# Patient Record
Sex: FEMALE | Race: WHITE | NOT HISPANIC OR LATINO | Employment: OTHER | URBAN - METROPOLITAN AREA
[De-identification: names, ages, dates, MRNs, and addresses within clinical notes are randomized per-mention and may not be internally consistent; named-entity substitution may affect disease eponyms.]

---

## 2017-01-09 ENCOUNTER — OFFICE VISIT (OUTPATIENT)
Dept: RHEUMATOLOGY | Facility: CLINIC | Age: 42
End: 2017-01-09
Payer: COMMERCIAL

## 2017-01-09 ENCOUNTER — CLINICAL SUPPORT (OUTPATIENT)
Dept: CARDIOLOGY | Facility: CLINIC | Age: 42
End: 2017-01-09
Payer: COMMERCIAL

## 2017-01-09 ENCOUNTER — RESEARCH ENCOUNTER (OUTPATIENT)
Dept: RESEARCH | Facility: HOSPITAL | Age: 42
End: 2017-01-09
Payer: COMMERCIAL

## 2017-01-09 ENCOUNTER — LAB VISIT (OUTPATIENT)
Dept: LAB | Facility: HOSPITAL | Age: 42
End: 2017-01-09
Attending: INTERNAL MEDICINE
Payer: COMMERCIAL

## 2017-01-09 DIAGNOSIS — Z51.81 MEDICATION MONITORING ENCOUNTER: Chronic | ICD-10-CM

## 2017-01-09 DIAGNOSIS — M35.00 SJOGREN'S DISEASE: ICD-10-CM

## 2017-01-09 DIAGNOSIS — Z00.6 EXAMINATION FOR NORMAL COMPARISON OR CONTROL IN CLINICAL RESEARCH: ICD-10-CM

## 2017-01-09 DIAGNOSIS — M35.00 SJOGREN'S DISEASE: Primary | ICD-10-CM

## 2017-01-09 LAB
DRUG STUDY SPECIMEN TYPE: NORMAL
DRUG STUDY TEST NAME: NORMAL

## 2017-01-09 PROCEDURE — 93000 ELECTROCARDIOGRAM COMPLETE: CPT | Mod: Q1,S$GLB,, | Performed by: NUCLEAR MEDICINE

## 2017-01-09 PROCEDURE — 99214 OFFICE O/P EST MOD 30 MIN: CPT | Mod: S$GLB,,, | Performed by: INTERNAL MEDICINE

## 2017-01-09 PROCEDURE — 36415 COLL VENOUS BLD VENIPUNCTURE: CPT | Mod: PO

## 2017-01-09 PROCEDURE — 99999 PR PBB SHADOW E&M-EST. PATIENT-LVL I: CPT | Mod: PBBFAC,,, | Performed by: INTERNAL MEDICINE

## 2017-01-09 NOTE — PROGRESS NOTES
CHIEF COMPLAINT:  Sjogren's.    PERTINENT HISTORY:  Rain continues on our Sjogren's study.  To this point, she   feels about the same.  She still has some moderate joint aches and pains in   various areas, though it seems to come and go.  She has had some improvement in   dry mouth at times, but then other times it feels about the same.  There has   been no change in glandular swelling.  Her appetite is okay.  She really did not   describe any side effects from the medication as far as severe nausea,   vomiting, or any kind of new rashes or other side effects.  She is doing well   participating in the study.    REVIEW OF SYSTEMS:  Basically negative for all major issues.  We talked about   her cholesterol, which is slightly elevated, but she has an excellent HDL, does   not meet criteria for treatment by the cholesterol guidelines.    PHYSICAL EXAMINATION:  VITAL SIGNS:  Her exam with her blood pressure 117/84, heart rate 82, weight 97   kilograms, respiratory rate 14, temperature 98.6 Fahrenheit, pain score 3,   generalized.  HEENT:   Shows a mild dryness of the eyes, mild dryness of the mouth, mild   submandibular swelling present.  Parotids are okay.  She has no inguinal   lymphadenopathy in supraclavicular area or neck.  Thyroid is not tender.  CARDIOPULMONARY:  Normal.  MUSCULOSKELETAL:  Muscle strength is still good.  Joint exam with no synovitis.    Her joints do not have any heat or redness, although they do ache.    LABORATORY DATA:  Reviewed.  She has an improved white cell count in the 5,000   range.  She had normal creatinine and liver studies.  Urinalysis is normal, hCG   negative.  Complements are normal.  CRP is good.    IMPRESSION:  Sjogren's with symptoms about the same, no intolerance or side   effects from the medication.    PLAN:  Continue the study.  No change in meds.  Encouraged her to eat a healthy   diet, exercise, and I do not think she needs to be on extra medication for    cholesterol.    /sc [id number] blanks      MAISHA/ALEX  dd: 01/09/2017 10:34:20 (CST)  td: 01/09/2017 20:54:47 (CST)  Doc ID   #[id number]  Job ID #[id number]    CC:

## 2017-01-10 NOTE — PROGRESS NOTES
PROTOCOL: ROCHE [id number]  SUBJECT INITIALS: [facility]  SUBJECT SCREENING NUMBER: [id number]  SUBJECT ID: [id number]        Subject came in today for Week 6 visit for the ROCHE [id number] trial. Subject wishes to continue with the study. Subject notes no changes in medication and no adverse events since last visit.      Vitals for visit are as follows:   BP: 117/84  Pulse: 82  RR: 14   Weight: 87.9 kg  Temp: 98.6 F      Subject reports experiencing no adverse effects since last visit. No nausea, vomiting, diarrhea, chest pains, shortness of breath, fever, or rash. No other side effects discussed at screening and Baseline were reported.      ESSDAI was completed by the PI per protocol. ESSPRI and SF-36 were completed by the subject per protocol.       Tear samples were collected per protocol --- beginning at 09:45 and ending 09:48 --- left eye 9 mm and right eye 13 mm. Salivary flow rate was tested per protocol --- weight of saliva tube before collection 15.67 grams --- weight of saliva tube after collection 18.65 grams. Subject completed salivary collection over 2 minutes (10:02 - 10:04). She was provided parafin wax to stimulate salivary production.       Patient brought back previously dispensed medication for collection:     [id number] - returned empty - all pills taken per Baseline instruction  [id number] 46 pills --- bottle unopened  [id number] - 10 pills remaining  [id number] - 12 pills remaining    Patient was dispensed new bottles per PI --- all other study drug was collected --- [id number], [id number], and [id number] were dispensed to the patient. Patient was instructed to continue taking medication as instructed --- Patient was informed that she will take 100 mg BID with food (once in the morning and once in the evening). Patient verbalized understanding. It was reiterated that patient should contact Dr. White or Research Coordinator if she feels that she is experiencing any adverse effects at any point in the study. Patient  verbalized understanding.       An ECG was performed --- triplicate readings were completed per protocol beginning at 10:43 and ending at 10:45      ESR sample was drawn and ESR reading completed. Test started at 11:10 and ended at 12:10. ESR --- 12.       Pregnancy test was performed and was negative.      Labs were drawn per the study protocol at 09:20. A urine sample was collected at 10:00. All labs were shipped per requisition (updated laboratory data provided by ; all labs shipped to [place], [place]) --- confirmation number: [id number].

## 2017-02-06 ENCOUNTER — CLINICAL SUPPORT (OUTPATIENT)
Dept: CARDIOLOGY | Facility: CLINIC | Age: 42
End: 2017-02-06
Payer: COMMERCIAL

## 2017-02-06 ENCOUNTER — RESEARCH ENCOUNTER (OUTPATIENT)
Dept: RESEARCH | Facility: HOSPITAL | Age: 42
End: 2017-02-06
Payer: COMMERCIAL

## 2017-02-06 ENCOUNTER — LAB VISIT (OUTPATIENT)
Dept: LAB | Facility: HOSPITAL | Age: 42
End: 2017-02-06
Attending: INTERNAL MEDICINE
Payer: COMMERCIAL

## 2017-02-06 DIAGNOSIS — M35.00 SJOGREN'S DISEASE: ICD-10-CM

## 2017-02-06 DIAGNOSIS — Z00.6 EXAMINATION FOR NORMAL COMPARISON OR CONTROL IN CLINICAL RESEARCH: ICD-10-CM

## 2017-02-06 LAB
DRUG STUDY SPECIMEN TYPE: NORMAL
DRUG STUDY TEST NAME: NORMAL

## 2017-02-06 PROCEDURE — 93000 ELECTROCARDIOGRAM COMPLETE: CPT | Mod: S$GLB,,, | Performed by: INTERNAL MEDICINE

## 2017-02-06 PROCEDURE — 36415 COLL VENOUS BLD VENIPUNCTURE: CPT | Mod: PO

## 2017-02-06 NOTE — PROGRESS NOTES
PROTOCOL: ROCHE [id number]  SUBJECT INITIALS: [facility]  SUBJECT SCREENING NUMBER: [id number]  SUBJECT ID: [id number]          Subject came in today for Week 9 visit for the ROCHE [id number] trial. Subject wishes to continue with the study. Subject notes no changes in medication and no adverse events since last visit.      Vitals for visit are as follows:   BP: 127/89  Pulse: 84  RR: 14   Weight: 88.3 kg  Temp: 96.5 F      Subject reports experiencing no adverse effects since last visit. No nausea, vomiting, diarrhea, chest pains, shortness of breath, fever, or rash. No other side effects discussed at screening and Baseline were reported.      ESSPRI was completed by the subject per protocol.       No tear or saliva samples were required at this visit.     No new medication was dispensed. Subject did not bring medication bottles to this visit, but informed coordinator that she believed she has enough medication to last until her Week. 12 visit. Patient was informed that if low on medication, more could be dispensed to her. Patient verbalized understanding and will check home supply to ensure she has an adequate amount. It was reiterated that patient should contact Dr. White or Research Coordinator if she feels that she is experiencing any adverse effects at any point in the study. Patient verbalized understanding.       An ECG was performed --- triplicate readings were completed per protocol beginning at 09:15 and ending at 09:17      ESR sample was drawn and ESR reading completed. Test started at 09:30 and ended at 10:30. ESR --- 9.       Pregnancy test was performed and was negative.      Labs were drawn per the study protocol at 08:55. A urine sample was collected at 09:00. All labs were shipped per requisition (updated laboratory data provided by ; all labs shipped to [place], [place]).

## 2017-02-08 DIAGNOSIS — M35.00 SJOGREN'S DISEASE: ICD-10-CM

## 2017-02-08 RX ORDER — HYDROXYCHLOROQUINE SULFATE 200 MG/1
200 TABLET, FILM COATED ORAL 2 TIMES DAILY
Qty: 60 TABLET | Refills: 6 | Status: SHIPPED | OUTPATIENT
Start: 2017-02-08 | End: 2018-03-14 | Stop reason: SDUPTHER

## 2017-02-21 ENCOUNTER — CLINICAL SUPPORT (OUTPATIENT)
Dept: CARDIOLOGY | Facility: CLINIC | Age: 42
End: 2017-02-21
Payer: COMMERCIAL

## 2017-02-21 ENCOUNTER — OFFICE VISIT (OUTPATIENT)
Dept: RHEUMATOLOGY | Facility: CLINIC | Age: 42
End: 2017-02-21
Payer: COMMERCIAL

## 2017-02-21 ENCOUNTER — RESEARCH ENCOUNTER (OUTPATIENT)
Dept: RESEARCH | Facility: HOSPITAL | Age: 42
End: 2017-02-21
Payer: COMMERCIAL

## 2017-02-21 ENCOUNTER — LAB VISIT (OUTPATIENT)
Dept: LAB | Facility: HOSPITAL | Age: 42
End: 2017-02-21
Attending: INTERNAL MEDICINE
Payer: COMMERCIAL

## 2017-02-21 VITALS
BODY MASS INDEX: 29.77 KG/M2 | RESPIRATION RATE: 14 BRPM | SYSTOLIC BLOOD PRESSURE: 128 MMHG | HEART RATE: 80 BPM | DIASTOLIC BLOOD PRESSURE: 81 MMHG | WEIGHT: 195.75 LBS | TEMPERATURE: 99 F

## 2017-02-21 DIAGNOSIS — M35.00 SJOGREN'S DISEASE: ICD-10-CM

## 2017-02-21 DIAGNOSIS — R59.0 LYMPHADENOPATHY, AXILLARY: ICD-10-CM

## 2017-02-21 DIAGNOSIS — Z00.6 EXAMINATION FOR NORMAL COMPARISON OR CONTROL IN CLINICAL RESEARCH: ICD-10-CM

## 2017-02-21 DIAGNOSIS — M35.00 SJOGREN'S DISEASE: Primary | ICD-10-CM

## 2017-02-21 DIAGNOSIS — K21.9 GASTROESOPHAGEAL REFLUX DISEASE, ESOPHAGITIS PRESENCE NOT SPECIFIED: ICD-10-CM

## 2017-02-21 DIAGNOSIS — Z51.81 MEDICATION MONITORING ENCOUNTER: Chronic | ICD-10-CM

## 2017-02-21 LAB
DRUG STUDY SPECIMEN TYPE: NORMAL
DRUG STUDY TEST NAME: NORMAL

## 2017-02-21 PROCEDURE — 99999 PR PBB SHADOW E&M-EST. PATIENT-LVL I: CPT | Mod: PBBFAC,,, | Performed by: INTERNAL MEDICINE

## 2017-02-21 PROCEDURE — 93000 ELECTROCARDIOGRAM COMPLETE: CPT | Mod: S$GLB,,, | Performed by: INTERNAL MEDICINE

## 2017-02-21 PROCEDURE — 36415 COLL VENOUS BLD VENIPUNCTURE: CPT | Mod: PO

## 2017-02-21 PROCEDURE — 99214 OFFICE O/P EST MOD 30 MIN: CPT | Mod: S$GLB,,, | Performed by: INTERNAL MEDICINE

## 2017-02-21 NOTE — PROGRESS NOTES
RHEUMATOLOGY FOLLOW-UP    CHIEF COMPLAINT:  Sjogren's.    PERTINENT HISTORY:  Olesya is finishing the last week of her Sjogren's study.  She   does note that she has a little more energy presently.  She has had less joint   aches and pains than she has been having.  She is not noticing any real big   change in her dryness symptoms to date.  She does feel healthy, actually turning   42 today.  She wants to get into exercising more.    REVIEW OF SYSTEMS:  GENERAL:  She has had no significant weight loss, fevers, or infections.  No   serious side effects from the medication are appreciated.   No chills,   fatigability, change in appetite.  SKIN:  No rashes, itching, or changes in color or texture of skin, no Raynaud's,   no malar rash.  HEAD:  No headache or recent head trauma.  EYES:  Visual acuity fine.  No ocular pain or redness.  EARS:  Denies ear pain, discharge or vertigo.  NOSE:  No loss of smell.  No epistaxis or postnasal drip.  MOUTH AND THROAT:  No hoarseness or change in voice or oral ulcers.  No   difficulty swallowing or dryness.  NODES:  Denies swollen glands.  CHEST:  There has been no pulmonary system changes that have developed.  Denies   shortness of breath, ASHRAF, cyanosis, wheezing, cough, and sputum production.  CARDIOVASCULAR:  There has been no cardiac system changes that have developed.    Denies chest pain, PND, orthopnea, or reduced exercise tolerance.  ABDOMEN:  GI wise, she does have some alternating diarrhea, constipation and   more reflux recently.  She is back on Nexium.  She has had one black stool, but   does not have any history of bleeding in the past.  No weight loss.  She is not   taking Pepto-Bismol.  Denies nausea, vomiting, abdominal pain, hematemesis.  URINARY:  No flank pain, dysuria or hematuria.  PERIPHERAL VASCULAR:  No claudication or cyanosis.  NEUROLOGIC:  No numbness, weakness or tingling.  GYNECOLOGIC:  Endometriosis noted by history.  MUSCULOSKELETAL:  Bones with  osteopenia by history.    MEDICATIONS:  Her med list reviewed.  She is taking Nexium now along with fish   oil and Plaquenil 200 b.i.d. and vitamin E.    PHYSICAL EXAMINATION:  VITAL SIGNS:  Today with blood pressure 128/81, with heart rate 80, weight 88.8   kg, respiratory rate normal, temperature is 98.9.  APPEARANCE:  Well nourished, well developed, in no acute distress.  SKIN:  No rashes, no wounds, no ecchymosis.  Nail beds pink.  No digital ulcers.    No nodules or tightness noted.  HEENT:  With very mild dryness in the eyes and mouth.  No redness in the eyes.    No lacrimal, parotid, or submandibular gland enlargement.  Minimal swollen nodes   are noted in her neck.  There is no tenderness in her thyroid.  Axillary areas,   has a small lymph node of the left axillary, it seems to be smaller.    Normocephalic, atraumatic, alert and oriented X3.  PERRLA.  Conjunctivae clear,   sclerae nonicteric.  No tonsillar enlargement.  No pharyngeal erythema or   exudate.  No ulcers or lesions noted.  Oral pharynx clear.  Neck supple, no JVD.    Normal ROM.  Thyroid normal.  No masses or tracheal deviation.  CHEST:  Lungs clear to auscultation bilaterally.  No dullness to percussion.  No   accessory muscle use.  No intercostal retraction noted.  CARDIOVASCULAR:  Normal S1, S2.  No rubs, murmurs or gallops.  No peripheral   edema.  ABDOMEN:  Bowel sounds normal, abdomen soft, not distended.  No tenderness or   masses.  No hepatosplenomegaly.  EXTREMITIES:  No clubbing, cyanosis or edema noted.  Dorsalis and pedis pulses   2+ palpable.  NEUROLOGICAL:  Cranial nerves II-XII intact.  Motor 5/5 strength major   flexors/extensors.  No tremor.  GAIT AND POSTURE:  Normal gait.  No cerebellar signs.  MUSCULOSKELETAL:  Muscle strength is normal.  Joint exam is normal.  Noticed   some arthralgias in her the hands.  Knees and hips move well.  She does not have   any major edema.    LABORATORY DATA:  Show the hemoglobin slightly low at  11.7, stable, MCV 87.  Her   chemistries include a normal creatinine, normal LFTS.  Lipids looked excellent.    Cholesterol 193, , triglyceride 62, HDL 67.  Urine with negative blood   or protein.  CRP 0.1.    IMPRESSION:  1.  Sjogren's syndrome - stable.  No signs of worsening presently.  No reactions   to her study meds.  2.  GI  irritation that sounds like irritable bowel and reflux together.    PLAN:  1.  We will have her try to use more fiber in her diet.  She can continue Nexium   over-the-counter.  Consider Pepto-Bismol if she gets diarrhea.  2.  No change in her omega-3.  3.  No change in Plaquenil.  4.  Continue to follow every three months unless other issues develop.  Follow   up with a study in a month.      MAISHA/ALEX  dd: 02/21/2017 09:25:20 (CST)  td: 02/21/2017 15:47:37 (CST)  Doc ID   #[id number]  Job ID #[id number]    CC: Beena Kwon M.D.

## 2017-02-21 NOTE — PROGRESS NOTES
PROTOCOL: ROCHE [id number]  SUBJECT INITIALS: [facility]  SUBJECT SCREENING NUMBER: [id number]  SUBJECT ID: [id number]      Subject came in today for Week 12 visit for the ROCHE [id number] trial. Subject wishes to continue with the study. Subject notes no changes in medication and no adverse events since last visit.      Vitals for visit are as follows:   BP: 128/81  Pulse: 80  RR: 14   Weight: 88.8 kg  Temp: 98.9 F      Subject reports experiencing no adverse effects since last visit. No nausea, vomiting, chest pains, shortness of breath, fever, or rash. No other side effects discussed at screening and Baseline were reported.       ESSDAI was completed by the PI per protocol. ESSPRI and SF-36 were completed by the subject per protocol.       Tear samples were collected per protocol --- beginning at 09:35 and ending 09:40 --- left eye 10 mm and right eye 17 mm. Salivary flow rate was tested per protocol --- weight of saliva tube before collection 15.76 grams --- weight of saliva tube after collection 17.17 grams. Subject completed salivary collection over 5 minutes (9:28 - 9:33). She was provided parafin wax to stimulate salivary production.       Patient brought back previously dispensed medication for collection:      [id number] - returned empty   [id number] - returned empty  [id number] - returned empty  [id number] - returned empty  [id number] - 34 pills remaining  [id number] - 46 pills remaining     All medication was returned.      An ECG was performed --- triplicate readings were completed per protocol beginning at 09:54 and ending at 09:56.      ESR sample was drawn and ESR reading completed. Test started at 09:55 and ended at 10:55. ESR --- 30.       Pregnancy test was performed and was negative.      Labs were drawn per the study protocol at 08:45. A urine sample was collected at 09:45. All labs were shipped per requisition (updated laboratory data provided by ; all labs shipped to [facility] [place])..

## 2017-03-06 ENCOUNTER — PATIENT MESSAGE (OUTPATIENT)
Dept: INTERNAL MEDICINE | Facility: CLINIC | Age: 42
End: 2017-03-06

## 2017-03-13 ENCOUNTER — CLINICAL SUPPORT (OUTPATIENT)
Dept: CARDIOLOGY | Facility: CLINIC | Age: 42
End: 2017-03-13
Payer: COMMERCIAL

## 2017-03-13 ENCOUNTER — OFFICE VISIT (OUTPATIENT)
Dept: RHEUMATOLOGY | Facility: CLINIC | Age: 42
End: 2017-03-13
Payer: COMMERCIAL

## 2017-03-13 ENCOUNTER — RESEARCH ENCOUNTER (OUTPATIENT)
Dept: RESEARCH | Facility: HOSPITAL | Age: 42
End: 2017-03-13
Payer: COMMERCIAL

## 2017-03-13 ENCOUNTER — LAB VISIT (OUTPATIENT)
Dept: LAB | Facility: HOSPITAL | Age: 42
End: 2017-03-13
Attending: INTERNAL MEDICINE
Payer: COMMERCIAL

## 2017-03-13 DIAGNOSIS — M35.00 SJOGREN'S DISEASE: Primary | ICD-10-CM

## 2017-03-13 DIAGNOSIS — R59.9 ADENOPATHY: ICD-10-CM

## 2017-03-13 DIAGNOSIS — M35.00 SJOGREN'S DISEASE: ICD-10-CM

## 2017-03-13 DIAGNOSIS — Z00.6 EXAMINATION FOR NORMAL COMPARISON OR CONTROL IN CLINICAL RESEARCH: ICD-10-CM

## 2017-03-13 DIAGNOSIS — Z51.81 MEDICATION MONITORING ENCOUNTER: Chronic | ICD-10-CM

## 2017-03-13 DIAGNOSIS — R76.8 RHEUMATOID FACTOR POSITIVE: ICD-10-CM

## 2017-03-13 LAB — DRUG STUDY SPECIMEN TYPE: NORMAL

## 2017-03-13 PROCEDURE — 93000 ELECTROCARDIOGRAM COMPLETE: CPT | Mod: S$GLB,,, | Performed by: NUCLEAR MEDICINE

## 2017-03-13 PROCEDURE — 99215 OFFICE O/P EST HI 40 MIN: CPT | Mod: S$GLB,,, | Performed by: INTERNAL MEDICINE

## 2017-03-13 PROCEDURE — 1160F RVW MEDS BY RX/DR IN RCRD: CPT | Mod: S$GLB,,, | Performed by: INTERNAL MEDICINE

## 2017-03-13 PROCEDURE — 36415 COLL VENOUS BLD VENIPUNCTURE: CPT | Mod: PO

## 2017-03-13 PROCEDURE — 99999 PR PBB SHADOW E&M-EST. PATIENT-LVL I: CPT | Mod: PBBFAC,,, | Performed by: INTERNAL MEDICINE

## 2017-03-13 NOTE — PROGRESS NOTES
RHEUMATOLOGY FOLLOW-UP    CHIEF COMPLAINT:  Sjogren's study.    PERTINENT HISTORY:  Olesya has completed her Sjogren's study.  She says, she has a   little less stiffness and soreness than she had to begin with.  There has been   no major changes in her dry eyes symptoms that she can tell.  She remains on   Plaquenil 200 mg b.i.d. and is using omega-3.  She has noted a couple of places   with lymphadenopathy, particularly under her right axilla.  No nodes are   seemingly growing.  Her weight has been fluctuating.  She had no severe eye   redness or itching of her eyes.  She has had no increasing cough or respiratory   symptoms.    REVIEW OF SYSTEMS:  GENERAL:  Weight as mentioned.  No severe infection.  She had no reactions to   the study drug.  This included no skin rashes, no increasing nausea or vomiting,   no diarrhea, no ill effects from a cardiopulmonary standpoint of chest pain,   shortness of breath, etc.  In general, everything seems stable.  No fevers,   chills, fatigability, change in appetite.  No recent infections.  SKIN:  No rashes, itching or changes in color or texture of skin, no Raynaud's,   no malar rash.  HEAD:  No headache or recent head trauma.  EYES:  Visual acuity fine.  No ocular pain or redness.  EARS:  Denies ear pain, discharge or vertigo.  NOSE:  No loss of smell.  No epistaxis or postnasal drip.  MOUTH AND THROAT:  No hoarseness or change in voice or oral ulcers.  No   difficulty swallowing or dryness.  NODES:  Denies swollen glands.  CHEST:  Denies shortness of breath, ASHRAF, cyanosis, wheezing, cough and sputum   production.  CARDIOVASCULAR:  Denies chest pain, PND, orthopnea or reduced exercise   tolerance.  ABDOMEN:  She does have irritable bowel type symptoms, alternating diarrhea and   constipation in the past and reflux, is on a PPI.  No weight loss.  Denies   nausea, vomiting, abdominal pain, hematemesis or blood in stool.  URINARY:  No flank pain, dysuria or hematuria.  PERIPHERAL  VASCULAR:  No claudication or cyanosis.  NEUROLOGIC:  No numbness, weakness or tingling.    PHYSICAL EXAMINATION:  VITAL SIGNS:  Weight 86.5 kg, blood pressure 118/78, with the heart rate 70,   pain score of 4, temperature 98.6.  APPEARANCE:  Well nourished, well developed, in no acute distress.  SKIN:  With no new rashes, minimal dryness.  She has got no Raynaud's.  Blood   vessel pulses are good.  No wounds, no ecchymosis.  Nail beds pink.  No digital   ulcers.  No nodules or tightness noted.  HEENT:  Just have mild dry mouth.  Eyes are slightly dry as well, but they are   not red, not sensitive.  No lacrimal enlargement.  Her parotid and submandibular   are not swollen today, though they swell at times.  Normocephalic, atraumatic,   alert and oriented x3.  PERRLA.  Conjunctivae clear, sclerae nonicteric.  No   tonsillar enlargement.  No pharyngeal erythema or exudate.  No ulcers or lesions   noted.  Mucous membranes moist and pink.  Oral pharynx clear.  Neck supple, no   JVD.  Normal ROM.  Thyroid normal.  No masses or tracheal deviation.  No   cervical, axillary or inguinal lymph node enlargement.  CHEST:  Lungs clear to auscultation bilaterally.  No dullness to percussion.  No   accessory muscle use.  No intercostal retraction noted.  CARDIOVASCULAR:  Normal S1, S2.  No rubs, murmurs or gallops.  No peripheral   edema.  ABDOMEN:  Bowel sounds normal, abdomen soft, not distended.  No tenderness or   masses.  No hepatosplenomegaly.  EXTREMITIES:  No clubbing, cyanosis or edema noted.  Dorsalis and pedis pulses   2+ palpable.  NEUROLOGICAL:  Cranial nerves II-XII intact.  Motor 5/5 strength major   flexors/extensors.  No tremor.  GAIT AND POSTURE:  Normal gait.  No cerebellar signs.  MUSCULOSKELETAL:  There is no adenopathy nor anterior or posterior triangles.    Supraclavicular lymph nodes are good.  Just in the side, there is a small 1 cm   node in the right axilla she pointed out to me, not tender.  The left  axilla   good today.  No epitrochlear nodes.  Her hands with full motion.  PIP, MPS and   wrist, no synovitis.  The elbows, shoulders, hips and knees move well today.  No   synovitis of the knees.  Ankles move well.  No significant edema.    LABORATORY DATA:  Recent laboratory data with the white count 4700, hematocrit   stable at 38%, differential good.  Electrolytes normal.  Creatinine 0.7.  LFTs   normal.  Glucose normal.  CK normal.  Cholesterol 202, HDL upper normal 64, LDL   is normal 124.  Urinalysis negative.  Pregnancy test negative.  Troponin normal.    CRP 11.1.  Complements low normal 128 and 21 respectively.  INR normal.    IMPRESSION:  Sjogren's - on study with no complications.  Overall, she appears   stable, with mild lymphadenopathy present.    PLAN:  We will see back in three months.  Continue her Plaquenil and vitamins.    I encouraged her to exercise and do her diet.      MAISHA/PN  dd: 03/13/2017 09:31:49 (CDT)  td: 03/13/2017 12:47:38 (CDT)  Doc ID   #[id number]  Job ID #[id number]    CC: Beena Kwon M.D.

## 2017-03-13 NOTE — PROGRESS NOTES
PROTOCOL: ROCHE [id number]  SUBJECT INITIALS: [facility]  SUBJECT SCREENING NUMBER: [id number]  SUBJECT ID: [id number]      Subject came in today for Follow-up visit for the ROCHE [id number] trial. Subject is aware that this is the final visit for the trial.       Vitals for visit are as follows:     BP: 118/78  Pulse: 70  RR: 14   Weight: 86.5 kg  Temp: 98.6 F      Subject reports no issues since last visit.       ESSDAI was completed by the PI per protocol. ESSPRI was completed by the subject per protocol.         An ECG was performed --- triplicate readings were completed per protocol beginning at 09:55 and ending at 09:57.      Pregnancy test was performed and was negative.      Labs were drawn per the study protocol at 09:05. A urine sample was collected at 09:45. All labs were shipped per requisition (updated laboratory data provided by ; all labs shipped to [facility] [place]).

## 2017-03-27 ENCOUNTER — TELEPHONE (OUTPATIENT)
Dept: INTERNAL MEDICINE | Facility: CLINIC | Age: 42
End: 2017-03-27

## 2017-03-27 ENCOUNTER — HOSPITAL ENCOUNTER (OUTPATIENT)
Dept: RADIOLOGY | Facility: HOSPITAL | Age: 42
Discharge: HOME | End: 2017-03-27
Attending: FAMILY MEDICINE
Payer: COMMERCIAL

## 2017-03-27 ENCOUNTER — HOSPITAL ENCOUNTER (OUTPATIENT)
Dept: RADIOLOGY | Facility: HOSPITAL | Age: 42
Discharge: HOME | End: 2017-03-27
Attending: INTERNAL MEDICINE
Payer: COMMERCIAL

## 2017-03-27 ENCOUNTER — OFFICE VISIT (OUTPATIENT)
Dept: PULMONOLOGY | Facility: CLINIC | Age: 42
End: 2017-03-27
Payer: COMMERCIAL

## 2017-03-27 VITALS
BODY MASS INDEX: 28.46 KG/M2 | WEIGHT: 187.81 LBS | OXYGEN SATURATION: 98 % | SYSTOLIC BLOOD PRESSURE: 100 MMHG | HEIGHT: 68 IN | HEART RATE: 79 BPM | RESPIRATION RATE: 18 BRPM | DIASTOLIC BLOOD PRESSURE: 70 MMHG

## 2017-03-27 DIAGNOSIS — R91.8 MULTIPLE PULMONARY NODULES: ICD-10-CM

## 2017-03-27 DIAGNOSIS — R91.1 LUNG NODULE: ICD-10-CM

## 2017-03-27 DIAGNOSIS — M35.00 SJOGREN'S DISEASE: ICD-10-CM

## 2017-03-27 DIAGNOSIS — Z12.31 ENCOUNTER FOR SCREENING MAMMOGRAM FOR MALIGNANT NEOPLASM OF BREAST: Primary | ICD-10-CM

## 2017-03-27 DIAGNOSIS — Z12.31 ENCOUNTER FOR SCREENING MAMMOGRAM FOR MALIGNANT NEOPLASM OF BREAST: ICD-10-CM

## 2017-03-27 DIAGNOSIS — R91.1 LUNG NODULE: Primary | ICD-10-CM

## 2017-03-27 PROCEDURE — 90686 IIV4 VACC NO PRSV 0.5 ML IM: CPT | Mod: S$GLB,,, | Performed by: INTERNAL MEDICINE

## 2017-03-27 PROCEDURE — 1160F RVW MEDS BY RX/DR IN RCRD: CPT | Mod: S$GLB,,, | Performed by: INTERNAL MEDICINE

## 2017-03-27 PROCEDURE — 71260 CT THORAX DX C+: CPT | Mod: 26,,, | Performed by: RADIOLOGY

## 2017-03-27 PROCEDURE — 90471 IMMUNIZATION ADMIN: CPT | Mod: S$GLB,,, | Performed by: INTERNAL MEDICINE

## 2017-03-27 PROCEDURE — 99999 PR PBB SHADOW E&M-EST. PATIENT-LVL III: CPT | Mod: PBBFAC,,, | Performed by: INTERNAL MEDICINE

## 2017-03-27 PROCEDURE — 25500020 PHARM REV CODE 255: Mod: PO | Performed by: INTERNAL MEDICINE

## 2017-03-27 PROCEDURE — 77067 SCR MAMMO BI INCL CAD: CPT | Mod: 26,,, | Performed by: RADIOLOGY

## 2017-03-27 PROCEDURE — 77067 SCR MAMMO BI INCL CAD: CPT | Mod: TC

## 2017-03-27 PROCEDURE — 99214 OFFICE O/P EST MOD 30 MIN: CPT | Mod: 25,S$GLB,, | Performed by: INTERNAL MEDICINE

## 2017-03-27 PROCEDURE — 71260 CT THORAX DX C+: CPT | Mod: TC,PO

## 2017-03-27 RX ADMIN — IOHEXOL 75 ML: 350 INJECTION, SOLUTION INTRAVENOUS at 09:03

## 2017-03-27 NOTE — PATIENT INSTRUCTIONS
Pulmonary Nodule      A pulmonary ( lung) nodule is small, round growth in the lung. The size of a pulmonary nodule can be as small as a pencil eraser (1/5 inch or 4 millmeters) to a little bigger than your biggest toenail (1 inch or 25 millimeters). A pulmonary nodule is usually an unplanned finding. It may be found on a chest X-ray or a computed tomography (CT) scan when you have imaging tests of your lungs done. When a pulmonary nodule is found, tests will be done to determine if the nodule is benign (not cancerous) or malignant (cancerous). Follow-up treatment or testing is based on the size of the pulmonary nodule and your risk of getting lung cancer.  CAUSES  Causes of pulmonary nodules can vary.  Benign pulmonary nodules can be caused from different things. Some of these things include:  Infection. This can be a common cause of a benign pulmonary nodule. The infection may be active (a current infection) or an old infection that is no longer active. Three types of infections can cause a pulmonary nodule. These are:   Bacterial Infection.   Fungal infection.   Viral Infections.   Hematoma. This is a bruise in the lung. A hematoma can happen from an injury to your chest.   Some common diseases can lead to benign pulmonary nodules. For example, rheumatoid arthritis can be a cause of a pulmonary nodule.   Other unusual things can cause a benign pulmonary nodule. These can include:   Having had tuberculosis.   Rare diseases, such as a lung cyst.   Malignant pulmonary nodules. These are cancerous growths. The cancer may have:  Started in the lung. Some lung cancers first detected as a pulmonary nodule.   Spread to the lung from cancer somewhere else in the body. This is called metastatic cancer.   Certain risk factors make a cancerous pulmonary nodule more likely. They include:   Age. As people get older, a pulmonary nodule is more likely to be cancerous.   Cancer history. If one of your immediate family members has  had cancer, you have a higher risk of developing cancer.   Smoking. This includes people who currently smoke and those who have quit.   DIAGNOSIS  To diagnose whether a pulmonary nodule is benign or malignant, a variety of tests will be done. This includes things such as:  Health history. Questions regarding your current health, past health, and family health will be asked.   Blood tests. Results of blood work can show:   Tumor markers for cancer.   Any type of infection.   A skin test called a tuberculin (TB) test may be done. This test can tell if you have been exposed to the germ that causes tuberculosis.   Imaging tests. These take pictures of your lungs. Types of imaging tests include:   Chest X-ray. This can help in several ways. An X-ray gives a close-up look at the pulmonary nodule. A new X-ray can be compared with any X-rays you have had in the past .   Computed tomography (CT) scan. This test shows smaller pulmonary nodules more clearly than an X-ray.   Positron emission tomography (PET) scan. This is a test that uses a radioactive substance to identify a pulmonary nodule. A safe amount of radioactive substance is injected into the blood stream. Then, the scan takes a picture of the pulmonary nodule. A malignant pulmonary nodule will absorb the substance faster than a benign pulmonary nodule. The radioactive substance is eliminated from your body in your urine.   Biopsy. This removes a tiny piece of the pulmonary nodule so it can be checked under a microscope. Medicine will be given to help keep you relaxed and pain free when a biopsy is done. Types of biopsies include:   Bronchoscopy . This is a surgical procedure. It can be used for pulmonary nodules that are close to the airways in the lung. It uses a scope (a thin tube) with a tiny camera and light on the end. The scope is put in the windpipe. Your caregiver can then see inside the lung. A tiny tool put through the scope is used to take a small sample  of the pulmonary nodule tissue.   Transthoracic needle aspiration . This method is used if the pulmonary nodule is far away from the air passages in the lung. A long, thin needle is put through the chest into the lung nodule. A CT scan is done at the same time which can make it easier to locate the pulmonary nodule.   Surgical lung biopsy . This is a surgical procedure in which the pulmonary nodule is removed. This is usually recommended when the pulmonary nodule is most likely malignant or a biopsy cannot be obtained by either bronchoscopy or transthoracic needle aspiration.   PULMONARY NODULE FOLLOW-UP RECOMMENDATIONS  The frequency of pulmonary nodule follow-up is based on your risk factors and size of the pulmonary nodule. If your caregiver suspects the pulmonary nodule is cancerous or the pulmonary nodule changes during any of the follow-up CT scans, additional testing or biopsies will be done.  If you have no or low risk of getting lung cancer (non-smoker, no personal cancer history), recommended follow-up is based on the following pulmonary nodule size:   A pulmonary nodule that is < 4 mm does not require any follow-up.   A pulmonary nodule that is 4 to 6 mm should be re-imaged by CT scan in 12 months.   A pulmonary nodule that is 6 to 8 mm should be re-imaged by CT scan at 6 to 12 months and then again at 18 to 24 months if no change in size.   A pulmonary nodule > 8 mm in size should be followed closely and re-imaged by CT scan at 3, 9, and 24 months.   If you are at risk of getting lung cancer (current or former smoker, family history of cancer), recommended follow-up is based on the following pulmonary nodule size:   A pulmonary nodule that is < 4 mm in size should be re-imaged by CT scan in 12 months.   A pulmonary nodule that is 4 to 6 mm in size should be re-imaged by CT scan at 6 to 12 months and again at 18 to 24 months.   A pulmonary nodule that is 6 to 8 mm in size should be re-imaged by CT scan at  3, 9, and 24 months.   A pulmonary nodule > 8 mm in size should be followed closely and re-imaged by CT scan at 3, 9, and 24 months.   SEEK MEDICAL CARE IF:  While waiting for test results to determine what type of pulmonary nodule you have, be sure to contact your caregiver if you:  Have trouble breathing when you are active.   Feel sick or unusually tired.   Do not feel like eating.   Lose weight without trying to.   Develop chills or night sweats.   Mild or moderate fevers generally have no long-term effects and often do not require treatment. There are a few exceptions (see below).   SEEK IMMEDIATE MEDICAL CARE IF:  You cannot catch your breath or you begin wheezing.   You cannot stop coughing.   You cough up blood.   You feel like you are going to pass out or become dizzy.   You have sudden chest pain.   You have a fever or persistent symptoms for more than 72 hours.   You have a fever and your symptoms suddenly get worse.   MAKE SURE YOU  Understand these instructions.   Will watch your condition.   Will get help right away if you are not doing well or get worse.       Please call office [phone] for any questions

## 2017-03-27 NOTE — TELEPHONE ENCOUNTER
----- Message from Marisela Mancini sent at 3/27/2017 10:08 AM CDT -----  pls input mammo order, call to [facility]..[phone] (home)

## 2017-03-27 NOTE — MR AVS SNAPSHOT
[facility] - [facility]  [address] [place] [address]  Phone: [phone]  Fax: [phone]                  Olesya Tay   3/27/2017 1:00 PM   Office Visit    Description:  Female : 1975   Provider:  Thom Velazquez MD   Department:  [facility] - Pulmonary Services           Reason for Visit     Pulmonary Nodules           Diagnoses this Visit        Comments    Lung nodule    -  Primary     Multiple pulmonary nodules         Sjogren's disease                To Do List           Future Appointments        Provider Department Dept Phone    3/27/2017 2:00 PM [facility] [phone]    2017 10:15 AM [facility], [facility] [phone]    2017 10:30 AM Jazmin May PA-C [facility] [phone]      Goals (5 Years of Data)     None      Follow-Up and Disposition     Return in about 2 years (around 3/27/2019) for flu shot, chest ct.      [facility] -  Assistance  Registered nurses in the [facility] provide clinical advisement, health education, appointment booking, and other advisory services.  Call for this free service at [phone].             Medications           Message regarding Medications     Verify the changes and/or additions to your medication regime listed below are the same as discussed with your clinician today.  If any of these changes or additions are incorrect, please notify your healthcare provider.             Verify that the below list of medications is an accurate representation of the medications you are currently taking.  If none reported, the list may be blank. If incorrect, please contact your healthcare provider. Carry this list with you in case of emergency.           Current Medications     ESOMEPRAZOLE MAGNESIUM (NEXIUM 24HR ORAL) Take by mouth.    fish oil-omega-3 fatty acids 300-1,000 mg capsule Take 2 g by  "mouth once daily.    hydroxychloroquine (PLAQUENIL) 200 mg tablet Take 1 tablet (200 mg total) by mouth 2 (two) times daily.    vitamin E 400 UNIT capsule Take 400 Units by mouth 2 (two) times daily.           Clinical Reference Information           Your Vitals Were     BP Pulse Resp Height Weight SpO2    100/70 79 18 5' 8" (1.727 m) 85.2 kg (187 lb 13.3 oz) 98%    BMI                28.56 kg/m2          Blood Pressure          Most Recent Value    BP  100/70      Allergies as of 3/27/2017     Pcn [Penicillins]      Immunizations Administered on Date of Encounter - 3/27/2017     Name Date Dose VIS Date Route    influenza - Quadrivalent - PF (ADULT) 3/27/2017 0.5 mL 8/7/2015 Intramuscular      Orders Placed During Today's Visit      Normal Orders This Visit    Influenza - Quadrivalent (3 years & older) (PF)     Future Labs/Procedures Expected by Expires    CT Chest With Contrast  3/27/2017 3/27/2018      Instructions    Pulmonary Nodule      A pulmonary ( lung) nodule is small, round growth in the lung. The size of a pulmonary nodule can be as small as a pencil eraser (1/5 inch or 4 millmeters) to a little bigger than your biggest toenail (1 inch or 25 millimeters). A pulmonary nodule is usually an unplanned finding. It may be found on a chest X-ray or a computed tomography (CT) scan when you have imaging tests of your lungs done. When a pulmonary nodule is found, tests will be done to determine if the nodule is benign (not cancerous) or malignant (cancerous). Follow-up treatment or testing is based on the size of the pulmonary nodule and your risk of getting lung cancer.  CAUSES  Causes of pulmonary nodules can vary.  Benign pulmonary nodules can be caused from different things. Some of these things include:  Infection. This can be a common cause of a benign pulmonary nodule. The infection may be active (a current infection) or an old infection that is no longer active. Three types of infections can cause a " pulmonary nodule. These are:   Bacterial Infection.   Fungal infection.   Viral Infections.   Hematoma. This is a bruise in the lung. A hematoma can happen from an injury to your chest.   Some common diseases can lead to benign pulmonary nodules. For example, rheumatoid arthritis can be a cause of a pulmonary nodule.   Other unusual things can cause a benign pulmonary nodule. These can include:   Having had tuberculosis.   Rare diseases, such as a lung cyst.   Malignant pulmonary nodules. These are cancerous growths. The cancer may have:  Started in the lung. Some lung cancers first detected as a pulmonary nodule.   Spread to the lung from cancer somewhere else in the body. This is called metastatic cancer.   Certain risk factors make a cancerous pulmonary nodule more likely. They include:   Age. As people get older, a pulmonary nodule is more likely to be cancerous.   Cancer history. If one of your immediate family members has had cancer, you have a higher risk of developing cancer.   Smoking. This includes people who currently smoke and those who have quit.   DIAGNOSIS  To diagnose whether a pulmonary nodule is benign or malignant, a variety of tests will be done. This includes things such as:  Health history. Questions regarding your current health, past health, and family health will be asked.   Blood tests. Results of blood work can show:   Tumor markers for cancer.   Any type of infection.   A skin test called a tuberculin (TB) test may be done. This test can tell if you have been exposed to the germ that causes tuberculosis.   Imaging tests. These take pictures of your lungs. Types of imaging tests include:   Chest X-ray. This can help in several ways. An X-ray gives a close-up look at the pulmonary nodule. A new X-ray can be compared with any X-rays you have had in the past .   Computed tomography (CT) scan. This test shows smaller pulmonary nodules more clearly than an X-ray.   Positron emission tomography  (PET) scan. This is a test that uses a radioactive substance to identify a pulmonary nodule. A safe amount of radioactive substance is injected into the blood stream. Then, the scan takes a picture of the pulmonary nodule. A malignant pulmonary nodule will absorb the substance faster than a benign pulmonary nodule. The radioactive substance is eliminated from your body in your urine.   Biopsy. This removes a tiny piece of the pulmonary nodule so it can be checked under a microscope. Medicine will be given to help keep you relaxed and pain free when a biopsy is done. Types of biopsies include:   Bronchoscopy . This is a surgical procedure. It can be used for pulmonary nodules that are close to the airways in the lung. It uses a scope (a thin tube) with a tiny camera and light on the end. The scope is put in the windpipe. Your caregiver can then see inside the lung. A tiny tool put through the scope is used to take a small sample of the pulmonary nodule tissue.   Transthoracic needle aspiration . This method is used if the pulmonary nodule is far away from the air passages in the lung. A long, thin needle is put through the chest into the lung nodule. A CT scan is done at the same time which can make it easier to locate the pulmonary nodule.   Surgical lung biopsy . This is a surgical procedure in which the pulmonary nodule is removed. This is usually recommended when the pulmonary nodule is most likely malignant or a biopsy cannot be obtained by either bronchoscopy or transthoracic needle aspiration.   PULMONARY NODULE FOLLOW-UP RECOMMENDATIONS  The frequency of pulmonary nodule follow-up is based on your risk factors and size of the pulmonary nodule. If your caregiver suspects the pulmonary nodule is cancerous or the pulmonary nodule changes during any of the follow-up CT scans, additional testing or biopsies will be done.  If you have no or low risk of getting lung cancer (non-smoker, no personal cancer history),  recommended follow-up is based on the following pulmonary nodule size:   A pulmonary nodule that is < 4 mm does not require any follow-up.   A pulmonary nodule that is 4 to 6 mm should be re-imaged by CT scan in 12 months.   A pulmonary nodule that is 6 to 8 mm should be re-imaged by CT scan at 6 to 12 months and then again at 18 to 24 months if no change in size.   A pulmonary nodule > 8 mm in size should be followed closely and re-imaged by CT scan at 3, 9, and 24 months.   If you are at risk of getting lung cancer (current or former smoker, family history of cancer), recommended follow-up is based on the following pulmonary nodule size:   A pulmonary nodule that is < 4 mm in size should be re-imaged by CT scan in 12 months.   A pulmonary nodule that is 4 to 6 mm in size should be re-imaged by CT scan at 6 to 12 months and again at 18 to 24 months.   A pulmonary nodule that is 6 to 8 mm in size should be re-imaged by CT scan at 3, 9, and 24 months.   A pulmonary nodule > 8 mm in size should be followed closely and re-imaged by CT scan at 3, 9, and 24 months.   SEEK MEDICAL CARE IF:  While waiting for test results to determine what type of pulmonary nodule you have, be sure to contact your caregiver if you:  Have trouble breathing when you are active.   Feel sick or unusually tired.   Do not feel like eating.   Lose weight without trying to.   Develop chills or night sweats.   Mild or moderate fevers generally have no long-term effects and often do not require treatment. There are a few exceptions (see below).   SEEK IMMEDIATE MEDICAL CARE IF:  You cannot catch your breath or you begin wheezing.   You cannot stop coughing.   You cough up blood.   You feel like you are going to pass out or become dizzy.   You have sudden chest pain.   You have a fever or persistent symptoms for more than 72 hours.   You have a fever and your symptoms suddenly get worse.   MAKE SURE YOU  Understand these instructions.   Will watch  your condition.   Will get help right away if you are not doing well or get worse.       Please call office [phone] for any questions         Language Assistance Services     ATTENTION: Language assistance services are available, free of charge. Please call [phone].      ATENCIÓN: Si habla cherelle, tiene a ortega disposición servicios gratuitos de asistencia lingüística. Llame al [phone].     CHÚ Ý: N?u b?n nói Ti?ng Vi?t, có các d?ch v? h? tr? ngôn ng? mi?n phí dành cho b?n. G?i s? [phone].         [facility] complies with applicable Federal civil rights laws and does not discriminate on the basis of race, color, national origin, age, disability, or sex.

## 2017-03-27 NOTE — PROGRESS NOTES
Subjective:      Olesya Tay is a 42 y.o. female self-referred for evaluation and treatment of a lung nodule.   The patient had an abnormal imaging study but reports experiencing no current or past pulmonary symptoms.   The patient denies other associated symptoms. She has never smoked.   The patient has no known exposure to tuberculosis. The patient does not have a history of cancer.  Last office visit was February 2016.  No interval changes in her health  Spirometry done in 2009 and also done in 2016 was reviewed normal spirometry  She has a small dominant nodule in the left lower lobe 5 mm.    There is also a very tiny's nodule in the right upper lobe.  All these nodules have not changed in size over the last 8 years.  We'll repeat another CT scan in 24 months.  If no interval change no further follow-up   patient will get influenza vaccination today     The following portions of the patient's history were reviewed and updated as appropriate:   She  has a past medical history of Constipation; Depression; Endometriosis of pelvis; GERD (gastroesophageal reflux disease); Gestational diabetes mellitus in pregnancy; Lung nodule; and Sjogren's syndrome.  She  does not have any pertinent problems on file.  She  has no past surgical history on file.  Her family history includes Arthritis in her maternal aunt; Cancer in her paternal grandmother; Early death (age of onset: 52) in her mother; Heart disease in her maternal grandfather.  She  reports that she has never smoked. She has never used smokeless tobacco. She reports that she drinks alcohol. She reports that she does not use illicit drugs.  She has a current medication list which includes the following prescription(s): esomeprazole magnesium, fish oil-omega-3 fatty acids, hydroxychloroquine, and vitamin e.  Current Outpatient Prescriptions on File Prior to Visit   Medication Sig Dispense Refill    ESOMEPRAZOLE MAGNESIUM (NEXIUM 24HR ORAL) Take by mouth.      fish  "oil-omega-3 fatty acids 300-1,000 mg capsule Take 2 g by mouth once daily.      hydroxychloroquine (PLAQUENIL) 200 mg tablet Take 1 tablet (200 mg total) by mouth 2 (two) times daily. 60 tablet 6    vitamin E 400 UNIT capsule Take 400 Units by mouth 2 (two) times daily.       No current facility-administered medications on file prior to visit.      She is allergic to pcn [penicillins]..    Review of Systems  A comprehensive review of systems was negative.      Objective:      /70  Pulse 79  Resp 18  Ht 5' 8" (1.727 m)  Wt 85.2 kg (187 lb 13.3 oz)  SpO2 98%  BMI 28.56 kg/m2  General appearance: alert, appears stated age, cooperative and no distress  Head: Normocephalic, without obvious abnormality  Eyes: negative findings: conjunctivae and sclerae normal  Throat: normal findings: lips normal without lesions  Lungs: clear to auscultation bilaterally and normal percussion bilaterally  Heart: regular rate and rhythm, S1, S2 normal, no murmur, click, rub or gallop  Extremities: extremities normal, atraumatic, no cyanosis or edema  Pulses: 2+ and symmetric  Skin: Skin color, texture, turgor normal. No rashes or lesions  Lymph nodes: Cervical, supraclavicular, and axillary nodes normal.  Neurologic: Grossly normal    Diagnostic Review  CT of chest   Lungs: Multiple previously described small bilateral subpleural pulmonary nodules are unchanged with the largest measuring approximately 5 mm on the posterior lateral margins of the left lower lobe as seen on image 46, series 3.    No new pulmonary nodules identified.  No parenchymal consolidations or pleural effusions.    Chest imaging  04/2009, 10/2009, 02/2013, 11/2015, 3/2017   Assessment:     Problem List Items Addressed This Visit     Lung nodule - Primary    Relevant Orders    CT Chest With Contrast    Sjogren's disease    Multiple pulmonary nodules    Relevant Orders    CT Chest With Contrast         Plan:      Return in about 2 years (around 3/27/2019) " for flu shot, chest ct.  If Scan in 2 years unchanged then no further f/u  Thank you for the courtesy of participating in the care of this patient    Discussed diagnosis, its evaluation, treatment and usual course. All questions answered.    This note was prepared using voice recognition system and is likely to have sound alike errors that may have been overlooked even after proof reading.  Please call me with any questions    Thom Velazquez MD

## 2017-03-28 ENCOUNTER — PATIENT MESSAGE (OUTPATIENT)
Dept: PULMONOLOGY | Facility: CLINIC | Age: 42
End: 2017-03-28

## 2017-03-30 ENCOUNTER — TELEPHONE (OUTPATIENT)
Dept: RADIOLOGY | Facility: HOSPITAL | Age: 42
End: 2017-03-30

## 2017-03-30 DIAGNOSIS — R92.8 ABNORMAL MAMMOGRAM: Primary | ICD-10-CM

## 2017-03-30 NOTE — TELEPHONE ENCOUNTER
Breast Care Management Follow-Up:    Date of Mammogram:03/27/17    Mammogram Reason:Screening    Mammogram Results:6mm round mass in the left breast      Referrals/Recommendations:Left dx mammo and u/s - complex cyst. 6mo f/u left dx mammo and u/s rec.        Patient Status:  03/30/17 Results given to pt. Mammo and u/s on 4/11/7. Results letter and report mailed to pt.  04/12/17 Results and rec given to pt. Mammo and u/s on 9/28/17. Results letter and report mailed to pt.

## 2017-04-10 ENCOUNTER — TELEPHONE (OUTPATIENT)
Dept: RADIOLOGY | Facility: HOSPITAL | Age: 42
End: 2017-04-10

## 2017-04-11 ENCOUNTER — HOSPITAL ENCOUNTER (OUTPATIENT)
Dept: RADIOLOGY | Facility: HOSPITAL | Age: 42
Discharge: HOME | End: 2017-04-11
Attending: OBSTETRICS & GYNECOLOGY
Payer: COMMERCIAL

## 2017-04-11 DIAGNOSIS — R92.8 ABNORMAL MAMMOGRAM: ICD-10-CM

## 2017-04-11 PROCEDURE — 76642 ULTRASOUND BREAST LIMITED: CPT | Mod: TC,PO,LT

## 2017-04-11 PROCEDURE — 77061 BREAST TOMOSYNTHESIS UNI: CPT | Mod: TC,LT

## 2017-04-11 PROCEDURE — 76642 ULTRASOUND BREAST LIMITED: CPT | Mod: 26,LT,, | Performed by: RADIOLOGY

## 2017-04-11 PROCEDURE — 77065 DX MAMMO INCL CAD UNI: CPT | Mod: 26,LT,, | Performed by: RADIOLOGY

## 2017-04-11 PROCEDURE — 77061 BREAST TOMOSYNTHESIS UNI: CPT | Mod: 26,LT,, | Performed by: RADIOLOGY

## 2017-04-12 DIAGNOSIS — R92.8 FOLLOW-UP EXAMINATION OF ABNORMAL MAMMOGRAM: Primary | ICD-10-CM

## 2017-05-25 ENCOUNTER — OFFICE VISIT (OUTPATIENT)
Dept: INTERNAL MEDICINE | Facility: CLINIC | Age: 42
End: 2017-05-25
Payer: COMMERCIAL

## 2017-05-25 ENCOUNTER — LAB VISIT (OUTPATIENT)
Dept: LAB | Facility: HOSPITAL | Age: 42
End: 2017-05-25
Attending: FAMILY MEDICINE
Payer: COMMERCIAL

## 2017-05-25 VITALS
HEART RATE: 69 BPM | TEMPERATURE: 97 F | SYSTOLIC BLOOD PRESSURE: 90 MMHG | DIASTOLIC BLOOD PRESSURE: 60 MMHG | BODY MASS INDEX: 29.38 KG/M2 | OXYGEN SATURATION: 99 % | HEIGHT: 67 IN | WEIGHT: 187.19 LBS

## 2017-05-25 DIAGNOSIS — F33.9 MAJOR DEPRESSION, RECURRENT, CHRONIC: ICD-10-CM

## 2017-05-25 DIAGNOSIS — K21.9 GASTROESOPHAGEAL REFLUX DISEASE, ESOPHAGITIS PRESENCE NOT SPECIFIED: ICD-10-CM

## 2017-05-25 DIAGNOSIS — Z23 NEED FOR DIPHTHERIA-TETANUS-PERTUSSIS (TDAP) VACCINE: ICD-10-CM

## 2017-05-25 DIAGNOSIS — Z00.00 ROUTINE GENERAL MEDICAL EXAMINATION AT A HEALTH CARE FACILITY: Primary | ICD-10-CM

## 2017-05-25 DIAGNOSIS — Z00.00 ROUTINE GENERAL MEDICAL EXAMINATION AT A HEALTH CARE FACILITY: ICD-10-CM

## 2017-05-25 DIAGNOSIS — D22.9 NEVUS: ICD-10-CM

## 2017-05-25 DIAGNOSIS — M35.00 SJOGREN'S SYNDROME, WITH UNSPECIFIED ORGAN INVOLVEMENT: ICD-10-CM

## 2017-05-25 DIAGNOSIS — R91.8 MULTIPLE PULMONARY NODULES: ICD-10-CM

## 2017-05-25 DIAGNOSIS — R76.8 RHEUMATOID FACTOR POSITIVE: ICD-10-CM

## 2017-05-25 LAB
ALBUMIN SERPL BCP-MCNC: 4 G/DL
ALP SERPL-CCNC: 61 U/L
ALT SERPL W/O P-5'-P-CCNC: 12 U/L
ANION GAP SERPL CALC-SCNC: 10 MMOL/L
AST SERPL-CCNC: 18 U/L
BASOPHILS # BLD AUTO: 0.03 K/UL
BASOPHILS NFR BLD: 0.5 %
BILIRUB SERPL-MCNC: 0.4 MG/DL
BUN SERPL-MCNC: 10 MG/DL
CALCIUM SERPL-MCNC: 9.5 MG/DL
CHLORIDE SERPL-SCNC: 103 MMOL/L
CHOLEST/HDLC SERPL: 3.5 {RATIO}
CO2 SERPL-SCNC: 26 MMOL/L
CREAT SERPL-MCNC: 0.8 MG/DL
DIFFERENTIAL METHOD: ABNORMAL
EOSINOPHIL # BLD AUTO: 0.1 K/UL
EOSINOPHIL NFR BLD: 1.6 %
ERYTHROCYTE [DISTWIDTH] IN BLOOD BY AUTOMATED COUNT: 14.9 %
EST. GFR  (AFRICAN AMERICAN): >60 ML/MIN/1.73 M^2
EST. GFR  (NON AFRICAN AMERICAN): >60 ML/MIN/1.73 M^2
GLUCOSE SERPL-MCNC: 80 MG/DL
HCT VFR BLD AUTO: 40.4 %
HDL/CHOLESTEROL RATIO: 28.2 %
HDLC SERPL-MCNC: 202 MG/DL
HDLC SERPL-MCNC: 57 MG/DL
HGB BLD-MCNC: 12.9 G/DL
LDLC SERPL CALC-MCNC: 115.6 MG/DL
LYMPHOCYTES # BLD AUTO: 2.2 K/UL
LYMPHOCYTES NFR BLD: 34.6 %
MCH RBC QN AUTO: 28.4 PG
MCHC RBC AUTO-ENTMCNC: 31.9 %
MCV RBC AUTO: 89 FL
MONOCYTES # BLD AUTO: 0.4 K/UL
MONOCYTES NFR BLD: 6.5 %
NEUTROPHILS # BLD AUTO: 3.6 K/UL
NEUTROPHILS NFR BLD: 56.6 %
NONHDLC SERPL-MCNC: 145 MG/DL
PLATELET # BLD AUTO: 314 K/UL
PMV BLD AUTO: 8.6 FL
POTASSIUM SERPL-SCNC: 3.9 MMOL/L
PROT SERPL-MCNC: 7.7 G/DL
RBC # BLD AUTO: 4.55 M/UL
SODIUM SERPL-SCNC: 139 MMOL/L
TRIGL SERPL-MCNC: 147 MG/DL
TSH SERPL DL<=0.005 MIU/L-ACNC: 2.37 UIU/ML
WBC # BLD AUTO: 6.28 K/UL

## 2017-05-25 PROCEDURE — 90715 TDAP VACCINE 7 YRS/> IM: CPT | Mod: S$GLB,,, | Performed by: FAMILY MEDICINE

## 2017-05-25 PROCEDURE — 80053 COMPREHEN METABOLIC PANEL: CPT

## 2017-05-25 PROCEDURE — 36415 COLL VENOUS BLD VENIPUNCTURE: CPT | Mod: PO

## 2017-05-25 PROCEDURE — 80061 LIPID PANEL: CPT

## 2017-05-25 PROCEDURE — 99396 PREV VISIT EST AGE 40-64: CPT | Mod: 25,S$GLB,, | Performed by: FAMILY MEDICINE

## 2017-05-25 PROCEDURE — 90471 IMMUNIZATION ADMIN: CPT | Mod: S$GLB,,, | Performed by: FAMILY MEDICINE

## 2017-05-25 PROCEDURE — 99999 PR PBB SHADOW E&M-EST. PATIENT-LVL III: CPT | Mod: PBBFAC,,, | Performed by: FAMILY MEDICINE

## 2017-05-25 PROCEDURE — 84443 ASSAY THYROID STIM HORMONE: CPT

## 2017-05-25 PROCEDURE — 85025 COMPLETE CBC W/AUTO DIFF WBC: CPT

## 2017-05-25 RX ORDER — BUPROPION HYDROCHLORIDE 75 MG/1
75 TABLET ORAL DAILY
Qty: 30 TABLET | Refills: 2 | Status: SHIPPED | OUTPATIENT
Start: 2017-05-25 | End: 2017-10-23 | Stop reason: SDUPTHER

## 2017-05-25 NOTE — PROGRESS NOTES
"Subjective:       Patient ID: Olesya Tay is a 42 y.o. female.    Chief Complaint: Annual Exam    42-year-old female patient with Patient Active Problem List:     GERD (gastroesophageal reflux disease)     Endometriosis of pelvis     Gestational diabetes mellitus in pregnancy     Major depression, recurrent, chronic     Sjogren's disease     Lymphadenopathy, axillary     Rheumatoid factor positive     Multiple pulmonary nodules     Adenopathy     Rosacea     Medication monitoring encounter  Here for routine annual physicals.  Patient reports that she's currently followed by rheumatology secondary to Sjogren's.   Patient has been doing well taking Nexium over-the-counter for acid reflex.  Reports that she's been having depression, has tried Zoloft in the past but secondary to weight gain has discontinued.   Patient would like to try something different for depression.   Denies of any suicidal or homicidal thoughts, worsening anxiety.   Denies of any chest pain or shortness of breath, palpitations      Review of Systems   Constitutional: Negative for fatigue.   Eyes: Negative for visual disturbance.   Respiratory: Negative for shortness of breath.    Cardiovascular: Negative for chest pain and leg swelling.   Gastrointestinal: Negative for abdominal pain, nausea and vomiting.   Musculoskeletal: Negative for myalgias.   Skin: Negative for rash.   Neurological: Negative for light-headedness and headaches.   Psychiatric/Behavioral: Positive for behavioral problems. Negative for self-injury, sleep disturbance and suicidal ideas. The patient is not nervous/anxious.          BP 90/60   Pulse 69   Temp 97.2 °F (36.2 °C) (Tympanic)   Ht 5' 7" (1.702 m)   Wt 84.9 kg (187 lb 2.7 oz)   LMP 05/18/2017   SpO2 99%   BMI 29.32 kg/m²   Objective:      Physical Exam   Constitutional: She is oriented to person, place, and time. She appears well-developed and well-nourished.   HENT:   Head: Normocephalic and atraumatic. "   Mouth/Throat: Oropharynx is clear and moist.   Cardiovascular: Normal rate, regular rhythm and normal heart sounds.    No murmur heard.  Pulmonary/Chest: Effort normal and breath sounds normal. She has no wheezes.   Abdominal: Soft. Bowel sounds are normal. There is no tenderness.   Musculoskeletal: She exhibits no edema.   Neurological: She is alert and oriented to person, place, and time.   Skin: Skin is warm and dry. No rash noted.   Positive for nevus on the right side of the chest wall , below  the right breast , round, erythematous, papular in nature.     Psychiatric: She has a normal mood and affect.         Assessment:       1. Routine general medical examination at a health care facility    2. Gastroesophageal reflux disease, esophagitis presence not specified    3. Sjogren's syndrome, with unspecified organ involvement    4. Rheumatoid factor positive    5. Multiple pulmonary nodules    6. Major depression, recurrent, chronic    7. Need for diphtheria-tetanus-pertussis (Tdap) vaccine    8. Nevus        Plan:   Routine general medical examination at a health care facility  -     Lipid panel; Future; Expected date: 05/25/2017  -     TSH; Future; Expected date: 05/25/2017  -     Urinalysis; Future; Expected date: 05/25/2017  -     CBC auto differential; Future; Expected date: 05/25/2017  -     Comprehensive metabolic panel; Future; Expected date: 05/25/2017  Vital signs stable today.  Clinical exam normal.   Encouraged to maintain lifestyle modifications with low-fat and low-cholesterol diet and exercise 30 minutes daily  Up-to-date with screenings    Gastroesophageal reflux disease, esophagitis presence not specified-stable on Nexium over-the-counter, encouraged to eat small frequent meals and avoid spicy diet    Sjogren's syndrome, with unspecified organ involvement  Rheumatoid factor positive  Followed by rheumatology currently on hydroxychloroquine 200 mg twice daily.    Multiple pulmonary  nodules-stable and asymptomatic, followed by yearly CT scan of the chest    Major depression, recurrent, chronic  -     buPROPion (WELLBUTRIN) 75 MG tablet; Take 1 tablet (75 mg total) by mouth once daily at 6am.  Dispense: 30 tablet; Refill: 2  Will do a trial of Wellbutrin 75 mg daily, patient to call us back if having any side effects.     Need for diphtheria-tetanus-pertussis (Tdap) vaccine  -     Tdap Vaccine  Tetanus booster given today    Nevus  -     Ambulatory Referral to Dermatology  Will refer to dermatology for further evaluation.

## 2017-08-17 ENCOUNTER — PATIENT MESSAGE (OUTPATIENT)
Dept: INTERNAL MEDICINE | Facility: CLINIC | Age: 42
End: 2017-08-17

## 2017-09-15 ENCOUNTER — TELEPHONE (OUTPATIENT)
Dept: RADIOLOGY | Facility: HOSPITAL | Age: 42
End: 2017-09-15

## 2017-09-18 ENCOUNTER — HOSPITAL ENCOUNTER (OUTPATIENT)
Dept: RADIOLOGY | Facility: HOSPITAL | Age: 42
Discharge: HOME | End: 2017-09-18
Attending: OBSTETRICS & GYNECOLOGY
Payer: COMMERCIAL

## 2017-09-18 VITALS — BODY MASS INDEX: 29.35 KG/M2 | HEIGHT: 67 IN | WEIGHT: 187 LBS

## 2017-09-18 DIAGNOSIS — R92.8 FOLLOW-UP EXAMINATION OF ABNORMAL MAMMOGRAM: ICD-10-CM

## 2017-09-18 PROCEDURE — 77065 DX MAMMO INCL CAD UNI: CPT | Mod: 26,LT,, | Performed by: RADIOLOGY

## 2017-09-18 PROCEDURE — 77065 DX MAMMO INCL CAD UNI: CPT | Mod: TC,LT

## 2017-09-18 PROCEDURE — 76642 ULTRASOUND BREAST LIMITED: CPT | Mod: 26,LT,, | Performed by: RADIOLOGY

## 2017-09-18 PROCEDURE — 76642 ULTRASOUND BREAST LIMITED: CPT | Mod: TC,PO,LT

## 2017-10-23 DIAGNOSIS — F33.9 MAJOR DEPRESSION, RECURRENT, CHRONIC: ICD-10-CM

## 2017-10-23 RX ORDER — BUPROPION HYDROCHLORIDE 75 MG/1
75 TABLET ORAL DAILY
Qty: 30 TABLET | Refills: 2 | Status: SHIPPED | OUTPATIENT
Start: 2017-10-23 | End: 2018-10-02

## 2017-12-05 ENCOUNTER — TELEPHONE (OUTPATIENT)
Dept: RHEUMATOLOGY | Facility: CLINIC | Age: 42
End: 2017-12-05

## 2017-12-05 NOTE — TELEPHONE ENCOUNTER
Ms. Tay was notified that her last immunization was 03/2017. She states she will receive her next immunization for this flu season at her local pharmacy.

## 2017-12-05 NOTE — TELEPHONE ENCOUNTER
----- Message from Olga Goodson sent at 12/5/2017 12:22 PM CST -----  Pt at [phone]//states she is checking on her flu shot//she cannot remember when she had the last one//please call//thanks/lh

## 2017-12-07 ENCOUNTER — LAB VISIT (OUTPATIENT)
Dept: LAB | Facility: HOSPITAL | Age: 42
End: 2017-12-07
Attending: INTERNAL MEDICINE
Payer: COMMERCIAL

## 2017-12-07 ENCOUNTER — OFFICE VISIT (OUTPATIENT)
Dept: RHEUMATOLOGY | Facility: CLINIC | Age: 42
End: 2017-12-07
Payer: COMMERCIAL

## 2017-12-07 VITALS
BODY MASS INDEX: 29.94 KG/M2 | HEART RATE: 91 BPM | HEIGHT: 68 IN | WEIGHT: 197.56 LBS | SYSTOLIC BLOOD PRESSURE: 131 MMHG | DIASTOLIC BLOOD PRESSURE: 74 MMHG

## 2017-12-07 DIAGNOSIS — R76.8 RHEUMATOID FACTOR POSITIVE: ICD-10-CM

## 2017-12-07 DIAGNOSIS — K11.7 XEROSTOMIA DUE TO AUTOIMMUNE DISEASE: ICD-10-CM

## 2017-12-07 DIAGNOSIS — Z23 NEED FOR PNEUMOCOCCAL VACCINATION: ICD-10-CM

## 2017-12-07 DIAGNOSIS — M35.9 XEROSTOMIA DUE TO AUTOIMMUNE DISEASE: ICD-10-CM

## 2017-12-07 DIAGNOSIS — M35.00 SJOGREN'S DISEASE: ICD-10-CM

## 2017-12-07 DIAGNOSIS — Z51.81 MEDICATION MONITORING ENCOUNTER: Chronic | ICD-10-CM

## 2017-12-07 DIAGNOSIS — M35.01 SJOGREN'S SYNDROME WITH KERATOCONJUNCTIVITIS SICCA: Primary | ICD-10-CM

## 2017-12-07 DIAGNOSIS — M35.01 SJOGREN'S SYNDROME WITH KERATOCONJUNCTIVITIS SICCA: ICD-10-CM

## 2017-12-07 LAB
ALBUMIN SERPL BCP-MCNC: 4.2 G/DL
ALP SERPL-CCNC: 58 U/L
ALT SERPL W/O P-5'-P-CCNC: 14 U/L
ANION GAP SERPL CALC-SCNC: 11 MMOL/L
AST SERPL-CCNC: 18 U/L
BACTERIA #/AREA URNS HPF: ABNORMAL /HPF
BASOPHILS # BLD AUTO: 0.01 K/UL
BASOPHILS NFR BLD: 0.2 %
BILIRUB SERPL-MCNC: 0.3 MG/DL
BILIRUB UR QL STRIP: NEGATIVE
BUN SERPL-MCNC: 13 MG/DL
CALCIUM SERPL-MCNC: 9.5 MG/DL
CHLORIDE SERPL-SCNC: 103 MMOL/L
CLARITY UR: CLEAR
CO2 SERPL-SCNC: 24 MMOL/L
COLOR UR: YELLOW
CREAT SERPL-MCNC: 0.9 MG/DL
CRP SERPL-MCNC: 2.9 MG/L
DIFFERENTIAL METHOD: ABNORMAL
EOSINOPHIL # BLD AUTO: 0.1 K/UL
EOSINOPHIL NFR BLD: 1 %
ERYTHROCYTE [DISTWIDTH] IN BLOOD BY AUTOMATED COUNT: 12.9 %
ERYTHROCYTE [SEDIMENTATION RATE] IN BLOOD BY WESTERGREN METHOD: 15 MM/HR
EST. GFR  (AFRICAN AMERICAN): >60 ML/MIN/1.73 M^2
EST. GFR  (NON AFRICAN AMERICAN): >60 ML/MIN/1.73 M^2
GLUCOSE SERPL-MCNC: 91 MG/DL
GLUCOSE UR QL STRIP: NEGATIVE
HCT VFR BLD AUTO: 38.8 %
HGB BLD-MCNC: 13.1 G/DL
HGB UR QL STRIP: NEGATIVE
KETONES UR QL STRIP: ABNORMAL
LEUKOCYTE ESTERASE UR QL STRIP: ABNORMAL
LYMPHOCYTES # BLD AUTO: 1.8 K/UL
LYMPHOCYTES NFR BLD: 28.4 %
MCH RBC QN AUTO: 30.5 PG
MCHC RBC AUTO-ENTMCNC: 33.8 G/DL
MCV RBC AUTO: 90 FL
MICROSCOPIC COMMENT: ABNORMAL
MONOCYTES # BLD AUTO: 0.5 K/UL
MONOCYTES NFR BLD: 8.5 %
NEUTROPHILS # BLD AUTO: 3.9 K/UL
NEUTROPHILS NFR BLD: 61.9 %
NITRITE UR QL STRIP: NEGATIVE
PH UR STRIP: 7 [PH] (ref 5–8)
PLATELET # BLD AUTO: 311 K/UL
PMV BLD AUTO: 8.4 FL
POTASSIUM SERPL-SCNC: 3.5 MMOL/L
PROT SERPL-MCNC: 8.1 G/DL
PROT UR QL STRIP: NEGATIVE
RBC # BLD AUTO: 4.3 M/UL
SODIUM SERPL-SCNC: 138 MMOL/L
SP GR UR STRIP: 1.02 (ref 1–1.03)
URN SPEC COLLECT METH UR: ABNORMAL
WBC # BLD AUTO: 6.26 K/UL
WBC #/AREA URNS HPF: 6 /HPF (ref 0–5)

## 2017-12-07 PROCEDURE — 36415 COLL VENOUS BLD VENIPUNCTURE: CPT | Mod: PO

## 2017-12-07 PROCEDURE — 86225 DNA ANTIBODY NATIVE: CPT

## 2017-12-07 PROCEDURE — 80053 COMPREHEN METABOLIC PANEL: CPT | Mod: PO

## 2017-12-07 PROCEDURE — 86039 ANTINUCLEAR ANTIBODIES (ANA): CPT

## 2017-12-07 PROCEDURE — 86038 ANTINUCLEAR ANTIBODIES: CPT

## 2017-12-07 PROCEDURE — 90471 IMMUNIZATION ADMIN: CPT | Mod: S$GLB,,, | Performed by: INTERNAL MEDICINE

## 2017-12-07 PROCEDURE — 90472 IMMUNIZATION ADMIN EACH ADD: CPT | Mod: S$GLB,,, | Performed by: INTERNAL MEDICINE

## 2017-12-07 PROCEDURE — 86140 C-REACTIVE PROTEIN: CPT

## 2017-12-07 PROCEDURE — 99999 PR PBB SHADOW E&M-EST. PATIENT-LVL III: CPT | Mod: PBBFAC,,, | Performed by: PHYSICIAN ASSISTANT

## 2017-12-07 PROCEDURE — 85025 COMPLETE CBC W/AUTO DIFF WBC: CPT | Mod: PO

## 2017-12-07 PROCEDURE — 90670 PCV13 VACCINE IM: CPT | Mod: S$GLB,,, | Performed by: INTERNAL MEDICINE

## 2017-12-07 PROCEDURE — 86235 NUCLEAR ANTIGEN ANTIBODY: CPT | Mod: 59

## 2017-12-07 PROCEDURE — 99214 OFFICE O/P EST MOD 30 MIN: CPT | Mod: S$GLB,,, | Performed by: PHYSICIAN ASSISTANT

## 2017-12-07 PROCEDURE — 85651 RBC SED RATE NONAUTOMATED: CPT | Mod: PO

## 2017-12-07 PROCEDURE — 81000 URINALYSIS NONAUTO W/SCOPE: CPT | Mod: PO

## 2017-12-07 PROCEDURE — 90686 IIV4 VACC NO PRSV 0.5 ML IM: CPT | Mod: S$GLB,,, | Performed by: INTERNAL MEDICINE

## 2017-12-07 RX ORDER — PILOCARPINE HYDROCHLORIDE 5 MG/1
5 TABLET, FILM COATED ORAL 2 TIMES DAILY
Qty: 60 TABLET | Refills: 4 | Status: SHIPPED | OUTPATIENT
Start: 2017-12-07 | End: 2018-07-27 | Stop reason: SDUPTHER

## 2017-12-07 ASSESSMENT — ROUTINE ASSESSMENT OF PATIENT INDEX DATA (RAPID3): MDHAQ FUNCTION SCORE: .2

## 2017-12-07 NOTE — PROGRESS NOTES
Subjective:       Patient ID: Olesya Tay is a 42 y.o. female.    Chief Complaint: sjogrens and positive RF      Olesya is back today for rheumatology follow-up.     She has chronic Sjogren syndrome with a positive SEAMUS, positive SSA, positive SSB, positive rheumatoid factor and dryness.  She also had a mildly positive CCP antibody but no evidence of rheumatoid arthritis.  Bilateral hand x-rays showed no erosive changes present.  She did have arthralgias associated with Sjogren's but no synovitis or joint swelling.  She did have some morning stiffness.  Also fatigued.  She is on  Plaquenil 200 mg bid. This has helped. Her joint pain is better. She denies any prolonged joint stiffness.  No swelling in her joints.  She does have some intermittent arthralgias with wax and wane.  Usually takes ibuprofen or Tylenol.  Feels like her dry mouth seems to be getting worse.  She is using over-the-counter products and drinking water.  Dry eyes on rewetting drops.  Has not tried Salagen or evoxac.  Was on Sjogren study but this closed in the summertime.  Not sure she was on placebo versus drug.    Pain level today 5/10 generalized and bilateral shoulders and hands.      She did have lymphadenopathy and pulmonary nodules. Saw Dr Alonso the nodules are small.  All her pulmonary testing was stable he'll see her back in 2 years.  No fevers or weight loss. No night sweats.   No sun sensitive rashes.  No chest pain or shortness of breath.    Needs her flu shot, received tetanus with pertussis, needs pneumonia        Review of Systems   Constitutional: Negative.  Negative for activity change, appetite change, chills, fatigue and fever.   HENT: Negative.  Negative for mouth sores and trouble swallowing.         + dry mouth   Eyes: Negative.  Negative for photophobia, pain and redness.        No swollen or red eyes, no dry eye     Respiratory: Negative.  Negative for chest tightness, shortness of breath, wheezing and stridor.   "  Cardiovascular: Negative.  Negative for chest pain.   Gastrointestinal: Negative.  Negative for abdominal pain, blood in stool, diarrhea, nausea and vomiting.   Genitourinary: Negative.  Negative for dysuria, frequency, hematuria and urgency.   Musculoskeletal: Negative for arthralgias, back pain, gait problem, joint swelling, myalgias, neck pain and neck stiffness.   Skin: Negative.  Negative for color change, pallor and rash.   Neurological: Negative.  Negative for weakness.   Hematological: Negative for adenopathy.   Psychiatric/Behavioral: Negative for suicidal ideas.         Objective:   /74   Pulse 91   Ht 5' 8" (1.727 m)   Wt 89.6 kg (197 lb 8.5 oz)   BMI 30.03 kg/m²      Physical Exam   Constitutional: She is oriented to person, place, and time and well-developed, well-nourished, and in no distress. No distress.   HENT:   Head: Normocephalic and atraumatic.   Right Ear: External ear normal.   Left Ear: External ear normal.   Mouth/Throat: Mucous membranes are not pale, dry and not cyanotic. She does not have dentures. No oral lesions. Normal dentition. No dental abscesses, uvula swelling, lacerations or dental caries. No oropharyngeal exudate or posterior oropharyngeal edema.       Eyes: Conjunctivae and EOM are normal. Pupils are equal, round, and reactive to light. Right eye exhibits no discharge and no exudate. Left eye exhibits no discharge and no exudate. Right conjunctiva is not injected. Right conjunctiva has no hemorrhage. Left conjunctiva is not injected. Left conjunctiva has no hemorrhage. No scleral icterus.       Neck: Normal range of motion and full passive range of motion without pain. Neck supple. No thyromegaly present.   Cardiovascular: Normal rate, regular rhythm and normal heart sounds.    No murmur heard.  Pulmonary/Chest: Effort normal and breath sounds normal. She exhibits no tenderness.   Abdominal: Soft. Bowel sounds are normal.       Right Side Rheumatological Exam "     Examination finds the shoulder, wrist, knee, 1st PIP, 1st MCP, 2nd PIP, 2nd MCP, 3rd PIP, 3rd MCP, 4th PIP, 4th MCP, 5th PIP and 5th MCP normal.    The patient is tender to palpation of the elbow    Left Side Rheumatological Exam     Examination finds the shoulder, elbow, wrist, knee, 1st PIP, 1st MCP, 2nd PIP, 2nd MCP, 3rd PIP, 3rd MCP, 4th PIP, 4th MCP, 5th PIP and 5th MCP normal.      Lymphadenopathy:        Head (right side): No submental, no submandibular, no tonsillar, no preauricular, no posterior auricular and no occipital adenopathy present.        Head (left side): Submandibular adenopathy present. No submental, no tonsillar, no preauricular, no posterior auricular and no occipital adenopathy present.     She has cervical adenopathy.        Right cervical: No superficial cervical, no deep cervical and no posterior cervical adenopathy present.       Left cervical: Superficial cervical adenopathy present. No deep cervical and no posterior cervical adenopathy present.     She has axillary adenopathy.        Right axillary: No pectoral and no lateral adenopathy present.        Left axillary: Lateral adenopathy present. No pectoral adenopathy present.       Right: No inguinal, no supraclavicular and no epitrochlear adenopathy present.        Left: No inguinal, no supraclavicular and no epitrochlear adenopathy present.   Neurological: She is alert and oriented to person, place, and time. She displays normal reflexes. No cranial nerve deficit. She exhibits normal muscle tone. Gait normal.   Skin: Skin is warm and dry. No rash noted.     Musculoskeletal: Normal range of motion. She exhibits no edema.                No results found for this or any previous visit (from the past 1344 hour(s)).      Assessment:       1. Sjogren's disease (+ SEAMUS 1:640, + SSA, + RF)  with dryness and a few enlarged lymph nodes --worsening dry mouth    2. Pulmonary nodule -chronic noted ct chest most recent 2015 seeing pulmonary now        3. Rheumatoid factor positive with Mild + CCP currently no signs of developing RA, no erosive changes  Noted on x-ray krystle hands will continue to monitor - repeat ccp now neg, RF still + --stable on Plaquenil                Plan:       Flu vaccine and prevnar today     Continue plaquenil BID since nov (plaquenil exposure is 4.76 mg/kg X 18 months), she does get eye checks yearly    Trial of salagen 5 mg bid, for dryness in future can go to 7.5 mg bid if needed    Continue to follow up with pulmonary    continue otc products for dryness, eye drops, oasis and biotene products, consider adding salagen or evoxac if persist and no relief from otc products    Labs today cbc, cmp, esr, crp, ua, alba     rtc 5-6 months with cbc, cmp, esr, crp and ua

## 2017-12-07 NOTE — PROGRESS NOTES
Administered 0.5 cc pneumococcal conjugate Vaccination to right Deltoid. Pt tolerated well. No acute reaction noted to site. Pt instructed on S/S to report. Advised patient to wait in lobby 15 minutes after receiving injection to monitor for any reactions.. Pt verbalized understanding.     Lot: [id number]  Exp: 04/18        Administered 0.5 cc Influenza vaccination to left Deltoid. Pt tolerated well No acute reaction noted to site. Pt instructed on S/S to report. Advised patient to wait in lobby 15 minutes after receiving injection to monitor for any reactions.  Pt verbalized understanding.     Lot: [id number]  Exp: 06-30-18

## 2017-12-08 LAB
ANA SER QL IF: POSITIVE
ANA TITR SER IF: NORMAL {TITER}

## 2017-12-09 ENCOUNTER — PATIENT MESSAGE (OUTPATIENT)
Dept: RHEUMATOLOGY | Facility: CLINIC | Age: 42
End: 2017-12-09

## 2017-12-09 DIAGNOSIS — N30.00 ACUTE CYSTITIS WITHOUT HEMATURIA: Primary | ICD-10-CM

## 2017-12-11 ENCOUNTER — TELEPHONE (OUTPATIENT)
Dept: RHEUMATOLOGY | Facility: CLINIC | Age: 42
End: 2017-12-11

## 2017-12-11 LAB
ANTI SM ANTIBODY: 4.42 EU
ANTI SM/RNP ANTIBODY: 2.46 EU
ANTI-SM INTERPRETATION: NEGATIVE
ANTI-SM/RNP INTERPRETATION: NEGATIVE
ANTI-SSA ANTIBODY: 148.31 EU
ANTI-SSA INTERPRETATION: POSITIVE
ANTI-SSB ANTIBODY: 33.77 EU
ANTI-SSB INTERPRETATION: POSITIVE
DSDNA AB SER-ACNC: ABNORMAL [IU]/ML

## 2017-12-11 NOTE — TELEPHONE ENCOUNTER
----- Message from Cherry Anaya sent at 12/11/2017 11:55 AM CST -----  Patient would like to discuss her test results from her urine specimen. Please adv/call [phone].//thanks. cw

## 2017-12-11 NOTE — TELEPHONE ENCOUNTER
Informed patient I have received her message from the portal and have forwarded to Ms May. She will respond to that message before the end of the day. She is in  seeing patients.

## 2017-12-14 RX ORDER — NITROFURANTOIN 25; 75 MG/1; MG/1
100 CAPSULE ORAL 2 TIMES DAILY
Qty: 10 CAPSULE | Refills: 0 | Status: SHIPPED | OUTPATIENT
Start: 2017-12-14 | End: 2017-12-19

## 2018-01-15 ENCOUNTER — TELEPHONE (OUTPATIENT)
Dept: RHEUMATOLOGY | Facility: CLINIC | Age: 43
End: 2018-01-15

## 2018-01-15 NOTE — TELEPHONE ENCOUNTER
Spoke with patient. She states had a UTI one month ago, completed abx.  States still has back pain, with no other symptoms.   Advised patient to call PMD to speak to him about it and possibly have a second UA.

## 2018-03-14 DIAGNOSIS — M35.00 SJOGREN'S DISEASE: ICD-10-CM

## 2018-03-15 RX ORDER — HYDROXYCHLOROQUINE SULFATE 200 MG/1
TABLET, FILM COATED ORAL
Qty: 180 TABLET | Refills: 6 | Status: SHIPPED | OUTPATIENT
Start: 2018-03-15 | End: 2019-09-19 | Stop reason: SDUPTHER

## 2018-03-27 ENCOUNTER — PATIENT MESSAGE (OUTPATIENT)
Dept: RHEUMATOLOGY | Facility: CLINIC | Age: 43
End: 2018-03-27

## 2018-03-28 NOTE — TELEPHONE ENCOUNTER
Jazmin, I sent a message to pt informing her that she needed to send a message with the info or fax us the info.

## 2018-04-16 ENCOUNTER — PATIENT MESSAGE (OUTPATIENT)
Dept: RHEUMATOLOGY | Facility: CLINIC | Age: 43
End: 2018-04-16

## 2018-07-27 ENCOUNTER — LAB VISIT (OUTPATIENT)
Dept: LAB | Facility: HOSPITAL | Age: 43
End: 2018-07-27
Attending: PHYSICIAN ASSISTANT
Payer: COMMERCIAL

## 2018-07-27 ENCOUNTER — OFFICE VISIT (OUTPATIENT)
Dept: RHEUMATOLOGY | Facility: CLINIC | Age: 43
End: 2018-07-27
Payer: COMMERCIAL

## 2018-07-27 VITALS
HEART RATE: 72 BPM | SYSTOLIC BLOOD PRESSURE: 137 MMHG | BODY MASS INDEX: 31 KG/M2 | WEIGHT: 204.56 LBS | DIASTOLIC BLOOD PRESSURE: 85 MMHG | HEIGHT: 68 IN

## 2018-07-27 DIAGNOSIS — M35.01 SJOGREN'S SYNDROME WITH KERATOCONJUNCTIVITIS SICCA: Primary | ICD-10-CM

## 2018-07-27 DIAGNOSIS — M35.00 SJOGREN'S DISEASE: ICD-10-CM

## 2018-07-27 DIAGNOSIS — M35.01 SJOGREN'S SYNDROME WITH KERATOCONJUNCTIVITIS SICCA: ICD-10-CM

## 2018-07-27 DIAGNOSIS — K11.7 XEROSTOMIA DUE TO AUTOIMMUNE DISEASE: ICD-10-CM

## 2018-07-27 DIAGNOSIS — Z51.81 MEDICATION MONITORING ENCOUNTER: Chronic | ICD-10-CM

## 2018-07-27 DIAGNOSIS — M35.9 XEROSTOMIA DUE TO AUTOIMMUNE DISEASE: ICD-10-CM

## 2018-07-27 DIAGNOSIS — R76.8 RHEUMATOID FACTOR POSITIVE: ICD-10-CM

## 2018-07-27 DIAGNOSIS — E66.09 CLASS 1 OBESITY DUE TO EXCESS CALORIES WITHOUT SERIOUS COMORBIDITY WITH BODY MASS INDEX (BMI) OF 31.0 TO 31.9 IN ADULT: ICD-10-CM

## 2018-07-27 DIAGNOSIS — O24.410 DIET CONTROLLED GESTATIONAL DIABETES MELLITUS (GDM) IN THIRD TRIMESTER: ICD-10-CM

## 2018-07-27 DIAGNOSIS — R73.03 PRE-DIABETES: ICD-10-CM

## 2018-07-27 DIAGNOSIS — K21.9 GASTROESOPHAGEAL REFLUX DISEASE, ESOPHAGITIS PRESENCE NOT SPECIFIED: ICD-10-CM

## 2018-07-27 PROBLEM — E66.811 CLASS 1 OBESITY DUE TO EXCESS CALORIES WITHOUT SERIOUS COMORBIDITY WITH BODY MASS INDEX (BMI) OF 31.0 TO 31.9 IN ADULT: Status: ACTIVE | Noted: 2018-07-27

## 2018-07-27 LAB
ALBUMIN SERPL BCP-MCNC: 3.9 G/DL
ALP SERPL-CCNC: 56 U/L
ALT SERPL W/O P-5'-P-CCNC: 15 U/L
ANION GAP SERPL CALC-SCNC: 8 MMOL/L
AST SERPL-CCNC: 15 U/L
BASOPHILS # BLD AUTO: 0.02 K/UL
BASOPHILS NFR BLD: 0.3 %
BILIRUB SERPL-MCNC: 0.5 MG/DL
BUN SERPL-MCNC: 12 MG/DL
CALCIUM SERPL-MCNC: 9.2 MG/DL
CHLORIDE SERPL-SCNC: 104 MMOL/L
CO2 SERPL-SCNC: 25 MMOL/L
CREAT SERPL-MCNC: 0.8 MG/DL
CRP SERPL-MCNC: 2.6 MG/L
DIFFERENTIAL METHOD: ABNORMAL
EOSINOPHIL # BLD AUTO: 0.1 K/UL
EOSINOPHIL NFR BLD: 1.3 %
ERYTHROCYTE [DISTWIDTH] IN BLOOD BY AUTOMATED COUNT: 13 %
ERYTHROCYTE [SEDIMENTATION RATE] IN BLOOD BY WESTERGREN METHOD: 11 MM/HR
EST. GFR  (AFRICAN AMERICAN): >60 ML/MIN/1.73 M^2
EST. GFR  (NON AFRICAN AMERICAN): >60 ML/MIN/1.73 M^2
GLUCOSE SERPL-MCNC: 97 MG/DL
HCT VFR BLD AUTO: 41.1 %
HGB BLD-MCNC: 13.8 G/DL
LYMPHOCYTES # BLD AUTO: 1.5 K/UL
LYMPHOCYTES NFR BLD: 23.4 %
MCH RBC QN AUTO: 30.3 PG
MCHC RBC AUTO-ENTMCNC: 33.6 G/DL
MCV RBC AUTO: 90 FL
MONOCYTES # BLD AUTO: 0.5 K/UL
MONOCYTES NFR BLD: 8.1 %
NEUTROPHILS # BLD AUTO: 4.2 K/UL
NEUTROPHILS NFR BLD: 66.7 %
PLATELET # BLD AUTO: 315 K/UL
PMV BLD AUTO: 8.2 FL
POTASSIUM SERPL-SCNC: 3.7 MMOL/L
PROT SERPL-MCNC: 7.8 G/DL
RBC # BLD AUTO: 4.56 M/UL
SODIUM SERPL-SCNC: 137 MMOL/L
WBC # BLD AUTO: 6.28 K/UL

## 2018-07-27 PROCEDURE — 85025 COMPLETE CBC W/AUTO DIFF WBC: CPT | Mod: PO

## 2018-07-27 PROCEDURE — 99214 OFFICE O/P EST MOD 30 MIN: CPT | Mod: S$GLB,,, | Performed by: PHYSICIAN ASSISTANT

## 2018-07-27 PROCEDURE — 3008F BODY MASS INDEX DOCD: CPT | Mod: CPTII,S$GLB,, | Performed by: PHYSICIAN ASSISTANT

## 2018-07-27 PROCEDURE — 36415 COLL VENOUS BLD VENIPUNCTURE: CPT | Mod: PO

## 2018-07-27 PROCEDURE — 86038 ANTINUCLEAR ANTIBODIES: CPT

## 2018-07-27 PROCEDURE — 86140 C-REACTIVE PROTEIN: CPT | Mod: PO

## 2018-07-27 PROCEDURE — 86039 ANTINUCLEAR ANTIBODIES (ANA): CPT

## 2018-07-27 PROCEDURE — 99999 PR PBB SHADOW E&M-EST. PATIENT-LVL III: CPT | Mod: PBBFAC,,, | Performed by: PHYSICIAN ASSISTANT

## 2018-07-27 PROCEDURE — 86235 NUCLEAR ANTIGEN ANTIBODY: CPT | Mod: 59

## 2018-07-27 PROCEDURE — 85651 RBC SED RATE NONAUTOMATED: CPT

## 2018-07-27 PROCEDURE — 80053 COMPREHEN METABOLIC PANEL: CPT | Mod: PO

## 2018-07-27 RX ORDER — PILOCARPINE HYDROCHLORIDE 5 MG/1
5 TABLET, FILM COATED ORAL 2 TIMES DAILY
Qty: 60 TABLET | Refills: 4 | Status: SHIPPED | OUTPATIENT
Start: 2018-07-27 | End: 2019-08-26

## 2018-07-27 RX ORDER — PHENTERMINE HYDROCHLORIDE 37.5 MG/1
37.5 TABLET ORAL
Qty: 90 TABLET | Refills: 0 | Status: SHIPPED | OUTPATIENT
Start: 2018-07-27 | End: 2018-10-25

## 2018-07-27 NOTE — PROGRESS NOTES
Subjective:       Patient ID: Olesya Tay is a 43 y.o. female.    Chief Complaint: Sjogren's      Rain is back today for rheumatology follow-up.     She has chronic Sjogren syndrome with a positive SEAMUS, positive SSA, positive SSB, positive rheumatoid factor and dryness.  She also had a mildly positive CCP antibody but no evidence of rheumatoid arthritis.  Bilateral hand x-rays showed no erosive changes present.  She did have arthralgias associated with Sjogren's but no synovitis or joint swelling.  She did have some mild  morning stiffness~ 20 min  Also fatigued.  She is on  Plaquenil 200 mg bid. This has helped. Her joint pain is better.  She does have some intermittent arthralgias with wax and wane.  Usually takes ibuprofen or Tylenol.  Feels like her dry mouth seems to be getting worse.  She is using over-the-counter products and drinking water.  Dry eyes on rewetting drops.  Has not tried Salagen or evoxac.  Was on Sjogren study but this closed  Last summer. Not sure she was on placebo versus drug.    Pain level today 3/10 generalized and bilateral shoulders and hands.    Last year broke her toe. Was immobile for long time. Gained weight. Exercising some but more aching and pain with exercising. Has pool but not using regularly. Needs to establish care with endocrinology. Pre diabetes and other issues she would like to discuss       She did have lymphadenopathy and pulmonary nodules. Saw Dr Alonso the nodules are small.  All her pulmonary testing was stable he'll see her back in 2 years.  No fevers or weight loss. No night sweats.   No sun sensitive rashes.  No chest pain or shortness of breath.    Needs her flu shot, received tetanus with pertussis, needs pneumonia        Review of Systems   Constitutional: Negative.  Negative for activity change, appetite change, chills, fatigue and fever.   HENT: Negative.  Negative for mouth sores and trouble swallowing.         + dry mouth   Eyes: Negative.   "Negative for photophobia, pain and redness.        No swollen or red eyes, no dry eye     Respiratory: Negative.  Negative for chest tightness, shortness of breath, wheezing and stridor.    Cardiovascular: Negative.  Negative for chest pain.   Gastrointestinal: Negative.  Negative for abdominal pain, blood in stool, diarrhea, nausea and vomiting.   Genitourinary: Negative.  Negative for dysuria, frequency, hematuria and urgency.   Musculoskeletal: Positive for arthralgias. Negative for back pain, gait problem, joint swelling, neck pain and neck stiffness.   Skin: Negative.  Negative for color change, pallor and rash.   Neurological: Negative.  Negative for weakness.   Hematological: Negative for adenopathy.   Psychiatric/Behavioral: Negative for suicidal ideas.         Objective:   /85   Pulse 72   Ht 5' 8" (1.727 m)   Wt 92.8 kg (204 lb 9.4 oz)   BMI 31.11 kg/m²      Physical Exam   Constitutional: She is oriented to person, place, and time and well-developed, well-nourished, and in no distress. No distress.   HENT:   Head: Normocephalic and atraumatic.   Right Ear: External ear normal.   Left Ear: External ear normal.   Mouth/Throat: Mucous membranes are not pale, dry and not cyanotic. She does not have dentures. No oral lesions. Normal dentition. No dental abscesses, uvula swelling, lacerations or dental caries. No oropharyngeal exudate or posterior oropharyngeal edema.       Eyes: Conjunctivae and EOM are normal. Pupils are equal, round, and reactive to light. Right eye exhibits no discharge and no exudate. Left eye exhibits no discharge and no exudate. Right conjunctiva is not injected. Right conjunctiva has no hemorrhage. Left conjunctiva is not injected. Left conjunctiva has no hemorrhage. No scleral icterus.       Neck: Normal range of motion and full passive range of motion without pain. Neck supple. No thyromegaly present.   Cardiovascular: Normal rate, regular rhythm and normal heart sounds.  "   No murmur heard.  Pulmonary/Chest: Effort normal and breath sounds normal. She exhibits no tenderness.   Abdominal: Soft. Bowel sounds are normal.       Right Side Rheumatological Exam     Examination finds the shoulder, wrist, knee, 1st PIP, 1st MCP, 2nd PIP, 2nd MCP, 3rd PIP, 3rd MCP, 4th PIP, 4th MCP, 5th PIP and 5th MCP normal.    The patient is tender to palpation of the elbow    Left Side Rheumatological Exam     Examination finds the shoulder, elbow, wrist, knee, 1st PIP, 1st MCP, 2nd PIP, 2nd MCP, 3rd PIP, 3rd MCP, 4th PIP, 4th MCP, 5th PIP and 5th MCP normal.      Lymphadenopathy:        Head (right side): Submandibular adenopathy present. No submental, no tonsillar, no preauricular, no posterior auricular and no occipital adenopathy present.        Head (left side): Submandibular adenopathy present. No submental, no tonsillar, no preauricular, no posterior auricular and no occipital adenopathy present.     She has no cervical adenopathy.        Right cervical: No superficial cervical, no deep cervical and no posterior cervical adenopathy present.       Left cervical: No deep cervical and no posterior cervical adenopathy present.     She has no axillary adenopathy.        Right axillary: No pectoral and no lateral adenopathy present.        Left axillary: No pectoral and no lateral adenopathy present.       Right: No inguinal, no supraclavicular and no epitrochlear adenopathy present.        Left: No inguinal, no supraclavicular and no epitrochlear adenopathy present.   Parotid enlargement krystle  Not TTP  Soft to touch  No redness or masses palpated    Neurological: She is alert and oriented to person, place, and time. She displays normal reflexes. No cranial nerve deficit. She exhibits normal muscle tone. Gait normal.   Skin: Skin is warm and dry. No rash noted.     Musculoskeletal: Normal range of motion. She exhibits tenderness. She exhibits no edema.   Mild ttp to myofascial points  No synovitis or  dactylitis on exam              Recent Results (from the past 1344 hour(s))   Urinalysis    Collection Time: 07/27/18  7:46 AM   Result Value Ref Range    Specimen UA Urine, Clean Catch     Color, UA Yellow Yellow, Straw, Opal    Appearance, UA Clear Clear    pH, UA 6.0 5.0 - 8.0    Specific Gravity, UA 1.015 1.005 - 1.030    Protein, UA Negative Negative    Glucose, UA Negative Negative    Ketones, UA Negative Negative    Bilirubin (UA) Negative Negative    Occult Blood UA Negative Negative    Nitrite, UA Negative Negative    Urobilinogen, UA Negative <2.0 EU/dL    Leukocytes, UA 1+ (A) Negative   Urinalysis Microscopic    Collection Time: 07/27/18  7:46 AM   Result Value Ref Range    WBC, UA 4 0 - 5 /hpf    Bacteria, UA Few (A) None-Occ /hpf    Squam Epithel, UA 3 /hpf    Microscopic Comment SEE COMMENT    CBC auto differential    Collection Time: 07/27/18  7:55 AM   Result Value Ref Range    WBC 6.28 3.90 - 12.70 K/uL    RBC 4.56 4.00 - 5.40 M/uL    Hemoglobin 13.8 12.0 - 16.0 g/dL    Hematocrit 41.1 37.0 - 48.5 %    MCV 90 82 - 98 fL    MCH 30.3 27.0 - 31.0 pg    MCHC 33.6 32.0 - 36.0 g/dL    RDW 13.0 11.5 - 14.5 %    Platelets 315 150 - 350 K/uL    MPV 8.2 (L) 9.2 - 12.9 fL    Gran # (ANC) 4.2 1.8 - 7.7 K/uL    Lymph # 1.5 1.0 - 4.8 K/uL    Mono # 0.5 0.3 - 1.0 K/uL    Eos # 0.1 0.0 - 0.5 K/uL    Baso # 0.02 0.00 - 0.20 K/uL    Gran% 66.7 38.0 - 73.0 %    Lymph% 23.4 18.0 - 48.0 %    Mono% 8.1 4.0 - 15.0 %    Eosinophil% 1.3 0.0 - 8.0 %    Basophil% 0.3 0.0 - 1.9 %    Differential Method Automated    Comprehensive metabolic panel    Collection Time: 07/27/18  7:55 AM   Result Value Ref Range    Sodium 137 136 - 145 mmol/L    Potassium 3.7 3.5 - 5.1 mmol/L    Chloride 104 95 - 110 mmol/L    CO2 25 23 - 29 mmol/L    Glucose 97 70 - 110 mg/dL    BUN, Bld 12 6 - 20 mg/dL    Creatinine 0.8 0.5 - 1.4 mg/dL    Calcium 9.2 8.7 - 10.5 mg/dL    Total Protein 7.8 6.0 - 8.4 g/dL    Albumin 3.9 3.5 - 5.2 g/dL    Total  Bilirubin 0.5 0.1 - 1.0 mg/dL    Alkaline Phosphatase 56 55 - 135 U/L    AST 15 10 - 40 U/L    ALT 15 10 - 44 U/L    Anion Gap 8 8 - 16 mmol/L    eGFR if African American >60.0 >60 mL/min/1.73 m^2    eGFR if non African American >60.0 >60 mL/min/1.73 m^2   C-reactive protein    Collection Time: 07/27/18  7:55 AM   Result Value Ref Range    CRP 2.6 0.0 - 8.2 mg/L         Assessment:       1. Sjogren's disease (+ SEAMUS 1:640, + SSA, + RF)  with dryness and a few enlarged lymph nodes --worsening dry mouth  Otherwise stable   2. Pulmonary nodule -chronic noted ct chest most recent 2015 seeing pulmonary now       3. Rheumatoid factor positive with Mild + CCP currently no signs of developing RA, no erosive changes  Noted on x-ray krystle hands will continue to monitor - repeat ccp now neg, RF still + --stable on Plaquenil            4. Obesity    5. Prediabetes- gest diabetes- strong family history wanting to see endo       Plan:         Continue plaquenil BID since nov (plaquenil exposure is 4.76 mg/kg X 18 months), she does get eye checks yearly    Trial of salagen 5 mg bid, for dryness in future can go to 7.5 mg bid if needed, evoxac in the future if fails salagen   continue otc products for dryness, eye drops, oasis and biotene products,    Continue to follow up with pulmonary    Will do trial of adipex for weight loss, will help with energy and concentration also   37.5 mg dose Start 1/2 tablet in am before breakfast can work up to 1 tablet in am if needed   Discussed risk versus benefits and potential side effects of adipex. Medication Literature given to patient  Goal if to loose at least 10 lb by next visit     rtc 5-6 months with cbc, cmp, esr, crp and ua

## 2018-07-30 ENCOUNTER — PATIENT MESSAGE (OUTPATIENT)
Dept: RHEUMATOLOGY | Facility: CLINIC | Age: 43
End: 2018-07-30

## 2018-07-30 LAB
ANA SER QL IF: POSITIVE
ANA TITR SER IF: NORMAL {TITER}

## 2018-07-31 ENCOUNTER — PATIENT MESSAGE (OUTPATIENT)
Dept: RHEUMATOLOGY | Facility: CLINIC | Age: 43
End: 2018-07-31

## 2018-08-02 LAB
ANTI SM ANTIBODY: 4.49 EU
ANTI SM/RNP ANTIBODY: 4.37 EU
ANTI-SM INTERPRETATION: NEGATIVE
ANTI-SM/RNP INTERPRETATION: NEGATIVE
ANTI-SSA ANTIBODY: 163.55 EU
ANTI-SSA INTERPRETATION: POSITIVE
ANTI-SSB ANTIBODY: 14.62 EU
ANTI-SSB INTERPRETATION: NEGATIVE
DSDNA AB SER-ACNC: ABNORMAL [IU]/ML

## 2018-10-02 ENCOUNTER — LAB VISIT (OUTPATIENT)
Dept: LAB | Facility: HOSPITAL | Age: 43
End: 2018-10-02
Attending: FAMILY MEDICINE
Payer: COMMERCIAL

## 2018-10-02 ENCOUNTER — OFFICE VISIT (OUTPATIENT)
Dept: INTERNAL MEDICINE | Facility: CLINIC | Age: 43
End: 2018-10-02
Payer: COMMERCIAL

## 2018-10-02 VITALS
BODY MASS INDEX: 29.7 KG/M2 | HEIGHT: 68 IN | DIASTOLIC BLOOD PRESSURE: 70 MMHG | OXYGEN SATURATION: 99 % | TEMPERATURE: 98 F | WEIGHT: 196 LBS | SYSTOLIC BLOOD PRESSURE: 104 MMHG | HEART RATE: 82 BPM

## 2018-10-02 DIAGNOSIS — M35.01 SJOGREN'S SYNDROME WITH KERATOCONJUNCTIVITIS SICCA: ICD-10-CM

## 2018-10-02 DIAGNOSIS — Z00.00 ROUTINE GENERAL MEDICAL EXAMINATION AT A HEALTH CARE FACILITY: Primary | ICD-10-CM

## 2018-10-02 DIAGNOSIS — K21.9 GASTROESOPHAGEAL REFLUX DISEASE, ESOPHAGITIS PRESENCE NOT SPECIFIED: ICD-10-CM

## 2018-10-02 DIAGNOSIS — R91.8 MULTIPLE PULMONARY NODULES: ICD-10-CM

## 2018-10-02 DIAGNOSIS — R76.8 RHEUMATOID FACTOR POSITIVE: ICD-10-CM

## 2018-10-02 DIAGNOSIS — R73.03 PRE-DIABETES: ICD-10-CM

## 2018-10-02 DIAGNOSIS — F33.9 MAJOR DEPRESSION, RECURRENT, CHRONIC: ICD-10-CM

## 2018-10-02 DIAGNOSIS — Z00.00 ROUTINE GENERAL MEDICAL EXAMINATION AT A HEALTH CARE FACILITY: ICD-10-CM

## 2018-10-02 PROBLEM — E66.09 CLASS 1 OBESITY DUE TO EXCESS CALORIES WITHOUT SERIOUS COMORBIDITY WITH BODY MASS INDEX (BMI) OF 31.0 TO 31.9 IN ADULT: Status: RESOLVED | Noted: 2018-07-27 | Resolved: 2018-10-02

## 2018-10-02 PROBLEM — E66.811 CLASS 1 OBESITY DUE TO EXCESS CALORIES WITHOUT SERIOUS COMORBIDITY WITH BODY MASS INDEX (BMI) OF 31.0 TO 31.9 IN ADULT: Status: RESOLVED | Noted: 2018-07-27 | Resolved: 2018-10-02

## 2018-10-02 LAB
BILIRUB UR QL STRIP: NEGATIVE
CLARITY UR: CLEAR
COLOR UR: YELLOW
GLUCOSE UR QL STRIP: NEGATIVE
HGB UR QL STRIP: NEGATIVE
KETONES UR QL STRIP: NEGATIVE
LEUKOCYTE ESTERASE UR QL STRIP: NEGATIVE
NITRITE UR QL STRIP: NEGATIVE
PH UR STRIP: 7 [PH] (ref 5–8)
PROT UR QL STRIP: NEGATIVE
SP GR UR STRIP: 1.02 (ref 1–1.03)
URN SPEC COLLECT METH UR: NORMAL

## 2018-10-02 PROCEDURE — 99999 PR PBB SHADOW E&M-EST. PATIENT-LVL III: CPT | Mod: PBBFAC,,, | Performed by: FAMILY MEDICINE

## 2018-10-02 PROCEDURE — 99396 PREV VISIT EST AGE 40-64: CPT | Mod: 25,S$GLB,, | Performed by: FAMILY MEDICINE

## 2018-10-02 PROCEDURE — 81003 URINALYSIS AUTO W/O SCOPE: CPT | Mod: PO

## 2018-10-02 NOTE — PROGRESS NOTES
Subjective:       Patient ID: Olesya Tay is a 43 y.o. female.    Chief Complaint: Annual Exam (urinary frequency and back pain )    43-year-old female patient with Patient Active Problem List:     GERD (gastroesophageal reflux disease)     Gestational diabetes mellitus in pregnancy     Major depression, recurrent, chronic     Sjogren's syndrome with keratoconjunctivitis sicca     Lymphadenopathy, axillary     Rheumatoid factor positive     Multiple pulmonary nodules     Rosacea     Medication monitoring encounter     Xerostomia due to autoimmune disease     Pre-diabetes  Here for routine annual physicals  Patient reports that she has been started on Adipex recently by her rheumatologist and has lost 9 lb  Patient has not been taking any medication for depression and reports that she has been hunting well  Has been having minimal low back pain and would like to get checked on the urine to rule out UTI, denies any urinary symptoms at  This time  Patient was informed about having adenomyosis and has been having heavy menstrual cycles and occasionally, menstrual cramps , currently in her menstrual cycle started 3 days ago  Has appointment with her gynecologist Dr. Missy tipton next week   has been doing well with acid reflux symptoms without taking Nexium      Review of Systems   Constitutional: Negative for appetite change and fatigue.   Eyes: Negative for visual disturbance.   Respiratory: Negative for shortness of breath.    Cardiovascular: Negative for chest pain and leg swelling.   Gastrointestinal: Negative for abdominal pain, nausea and vomiting.   Genitourinary: Positive for menstrual problem. Negative for dysuria, flank pain and urgency.   Musculoskeletal: Negative for myalgias.   Skin: Negative for rash.   Neurological: Negative for light-headedness and headaches.   Psychiatric/Behavioral: Positive for dysphoric mood. Negative for sleep disturbance. The patient is not nervous/anxious.          BP  "104/70 (BP Location: Left arm, Patient Position: Sitting)   Pulse 82   Temp 98.3 °F (36.8 °C) (Tympanic)   Ht 5' 8" (1.727 m)   Wt 88.9 kg (195 lb 15.8 oz)   LMP 09/30/2018 (Exact Date)   SpO2 99%   BMI 29.80 kg/m²   Objective:      Physical Exam   Constitutional: She is oriented to person, place, and time. She appears well-developed and well-nourished.   HENT:   Head: Normocephalic and atraumatic.   Mouth/Throat: Oropharynx is clear and moist.   Cardiovascular: Normal rate, regular rhythm and normal heart sounds.   No murmur heard.  Pulmonary/Chest: Effort normal and breath sounds normal. She has no wheezes.   Abdominal: Soft. Bowel sounds are normal. There is no tenderness.   No CVA tenderness noted bilaterally   Musculoskeletal: She exhibits no edema.   Neurological: She is alert and oriented to person, place, and time.   Skin: Skin is warm and dry. No rash noted.   Psychiatric: She has a normal mood and affect.         Assessment:       1. Routine general medical examination at a health care facility    2. Sjogren's syndrome with keratoconjunctivitis sicca    3. Rheumatoid factor positive    4. Multiple pulmonary nodules    5. Gastroesophageal reflux disease, esophagitis presence not specified    6. Major depression, recurrent, chronic    7. Pre-diabetes        Plan:   Routine general medical examination at a health care facility  -     Lipid panel; Future; Expected date: 10/02/2018  -     Hemoglobin A1c; Future; Expected date: 10/02/2018  -     Urinalysis; Future; Expected date: 10/02/2018  -     TSH; Future; Expected date: 10/02/2018  Reviewed recent labs which looks stable  Will plan to get urinalysis today to rule out UTI  Would like to get fasting labs  Patient is scheduled to see her gynecologist at [facility] next week and will get mammogram    Sjogren's syndrome with keratoconjunctivitis sicca  Rheumatoid factor positive  Followed by Rheumatology currently on hydroxychloroquine    Multiple pulmonary " nodules-stable and asymptomatic    Gastroesophageal reflux disease, esophagitis presence not specified-stable currently not taking Nexium    Major depression, recurrent, chronic-stable without taking Wellbutrin    Pre-diabetes-Lifestyle modifications recommended to lose weight with BMI 29 currently on Adipex prescribed by Rheumatology    Flu shot given today

## 2018-10-11 ENCOUNTER — PATIENT MESSAGE (OUTPATIENT)
Dept: INTERNAL MEDICINE | Facility: CLINIC | Age: 43
End: 2018-10-11

## 2018-10-22 ENCOUNTER — LAB VISIT (OUTPATIENT)
Dept: LAB | Facility: HOSPITAL | Age: 43
End: 2018-10-22
Attending: FAMILY MEDICINE
Payer: COMMERCIAL

## 2018-10-22 DIAGNOSIS — Z00.00 ROUTINE GENERAL MEDICAL EXAMINATION AT A HEALTH CARE FACILITY: ICD-10-CM

## 2018-10-22 LAB
CHOLEST SERPL-MCNC: 177 MG/DL
CHOLEST/HDLC SERPL: 3.4 {RATIO}
ESTIMATED AVG GLUCOSE: 94 MG/DL
HBA1C MFR BLD HPLC: 4.9 %
HDLC SERPL-MCNC: 52 MG/DL
HDLC SERPL: 29.4 %
LDLC SERPL CALC-MCNC: 107.4 MG/DL
NONHDLC SERPL-MCNC: 125 MG/DL
TRIGL SERPL-MCNC: 88 MG/DL
TSH SERPL DL<=0.005 MIU/L-ACNC: 2.4 UIU/ML

## 2018-10-22 PROCEDURE — 36415 COLL VENOUS BLD VENIPUNCTURE: CPT | Mod: PO

## 2018-10-22 PROCEDURE — 83036 HEMOGLOBIN GLYCOSYLATED A1C: CPT

## 2018-10-22 PROCEDURE — 80061 LIPID PANEL: CPT

## 2018-10-22 PROCEDURE — 84443 ASSAY THYROID STIM HORMONE: CPT

## 2018-12-18 ENCOUNTER — OFFICE VISIT (OUTPATIENT)
Dept: ENDOCRINOLOGY | Facility: CLINIC | Age: 43
End: 2018-12-18
Payer: COMMERCIAL

## 2018-12-18 VITALS
HEART RATE: 87 BPM | DIASTOLIC BLOOD PRESSURE: 80 MMHG | SYSTOLIC BLOOD PRESSURE: 116 MMHG | TEMPERATURE: 98 F | BODY MASS INDEX: 29.47 KG/M2 | WEIGHT: 194.44 LBS | HEIGHT: 68 IN

## 2018-12-18 DIAGNOSIS — M35.00 HISTORY OF SJOGREN'S DISEASE: ICD-10-CM

## 2018-12-18 DIAGNOSIS — R63.5 WEIGHT GAIN: ICD-10-CM

## 2018-12-18 DIAGNOSIS — R53.82 CHRONIC FATIGUE: Primary | ICD-10-CM

## 2018-12-18 DIAGNOSIS — Z86.32 HISTORY OF GESTATIONAL DIABETES: ICD-10-CM

## 2018-12-18 DIAGNOSIS — L65.9 HAIR LOSS: ICD-10-CM

## 2018-12-18 PROCEDURE — 99999 PR PBB SHADOW E&M-EST. PATIENT-LVL III: CPT | Mod: PBBFAC,,, | Performed by: INTERNAL MEDICINE

## 2018-12-18 PROCEDURE — 99204 OFFICE O/P NEW MOD 45 MIN: CPT | Mod: S$GLB,,, | Performed by: INTERNAL MEDICINE

## 2018-12-18 PROCEDURE — 3008F BODY MASS INDEX DOCD: CPT | Mod: CPTII,S$GLB,, | Performed by: INTERNAL MEDICINE

## 2018-12-18 RX ORDER — PHENTERMINE HYDROCHLORIDE 37.5 MG/1
TABLET ORAL
Refills: 0 | COMMUNITY
Start: 2018-11-28 | End: 2019-05-29 | Stop reason: SDUPTHER

## 2018-12-18 RX ORDER — NORETHINDRONE ACETATE AND ETHINYL ESTRADIOL AND FERROUS FUMARATE 1MG-20(24)
KIT ORAL
Refills: 7 | COMMUNITY
Start: 2018-11-28 | End: 2021-02-19

## 2018-12-18 NOTE — LETTER
December 18, 2018      Jazmin May PA-C  [address]  [place] [address]           [facility] - Endocrinology  [address] [address]  [address]  [place] [address]  Phone: [phone]  Fax: [phone]          Patient: Olesya Tay   MR Number: [id number]   YOB: 1975   Date of Visit: 12/18/2018       Dear Jazmin May:    Thank you for referring Olesya Tay to me for evaluation. Here you will find relevant portions of my assessment and plan of care.    A/P:   Chronic fatigue  -     T4, free; Future; Expected date: 12/18/2018  -     T3; Future; Expected date: 12/18/2018  -     TSH; Future; Expected date: 12/18/2018  -     Prolactin; Future; Expected date: 12/18/2018  -     Cortisol, 8AM; Future; Expected date: 12/18/2018  -     21-Hydroxylase Antibodies, Serum; Future; Expected date: 12/18/2018    History of Sjogren's disease  -     T4, free; Future; Expected date: 12/18/2018  -     T3; Future; Expected date: 12/18/2018  -     TSH; Future; Expected date: 12/18/2018  -     Prolactin; Future; Expected date: 12/18/2018  -     Cortisol, 8AM; Future; Expected date: 12/18/2018  -     21-Hydroxylase Antibodies, Serum; Future; Expected date: 12/18/2018    History of gestational diabetes    Hair loss    Weight gain     Last A1c was 4.9  Patient is taking a diet pill.    Hair loss    Weight gain    History of endometriosis   Patient was prescribed birth control pills about 1 month ago.      If blood test results come back normal then follow up visit in one year,  Otherwise visit to discuss results.  If cortisol level is borderline  Then further test will be considered.    Pt understands the plan and instructions.               If you have questions, please do not hesitate to call me. I look forward to following Olesya Tay along with you.    Sincerely,    Derek Bolton MD    Enclosure  CC:  No Recipients    If you would like to receive this communication electronically, please contact  [email or web address] or [phone] to request more information on [facility] Link access.    For providers and/or their staff who would like to refer a patient to [facility], please contact us through our one-stop-shop provider referral line, Janeth Duckworth, at [phone].    If you feel you have received this communication in error or would no longer like to receive these types of communications, please e-mail [email or web address]

## 2018-12-18 NOTE — PROGRESS NOTES
Referring Provider:  Jazmin May PA-C  PCP:  Beena Kwon MD    Reason for referral:   Dr. Figueroa-[id number] was from 07/27/2018  Pre-diabetes  Diet-controlled gestational diabetes mellitus in the 3rd trimester    CC and HPI:  Oelsya Tay 43 y.o. female  Patient has Sjogern disease treated by rheumatologist.  Patient is complaining of fatigue, hair loss,  Weight gain, and having low blood pressure sometimes, 110/60.  Patient is not sure what his normal blood pressure for her.   She had gestational diabetes with her middle pregnancy but not with her last pregnancy 7 years ago.  She was reading about sjogern,   and she is concerned for thyroid disorder and possibly having Irving,s.  Recently she started feeling better after starting on Plaquenil.    Also she was prescribed birth control pills and she has been taking the birth control since 1 month ago.  She is taking a diet pill prescribed for her to lose weight.  No complaint of nausea, vomiting, chest pain, significant dizziness, feeling presyncope when getting up, edema, rash, or headache.  She denies galactorrhea but she noticed some galactorrhea before last pregnancy.  She has some chronic joint pain.  Blood tests recently showed A1c 4.9 and TSH of 2.  Patient is not on steroid.        Past Medical History:   Diagnosis Date    Constipation     Depression     not taking meds    Endometriosis of pelvis     GERD (gastroesophageal reflux disease)     Gestational diabetes mellitus in pregnancy     Lung nodule     Sjogren's syndrome        History reviewed. No pertinent surgical history.    Social History     Socioeconomic History    Marital status:      Spouse name: Not on file    Number of children: 3    Years of education: Not on file    Highest education level: Not on file   Social Needs    Financial resource strain: Not on file    Food insecurity - worry: Not on file    Food insecurity - inability: Not on file    Transportation needs -  medical: Not on file    Transportation needs - non-medical: Not on file   Occupational History    Occupation: part time library, selfemployed -rental  property   Tobacco Use    Smoking status: Never Smoker    Smokeless tobacco: Never Used   Substance and Sexual Activity    Alcohol use: Yes     Comment: socially    Drug use: No    Sexual activity: Yes     Partners: Male     Comment:  had vasectomy   Other Topics Concern    Not on file   Social History Narrative    Not on file         ROS:   Sjogern  History of the salivary gland.  The problem resolved  Saliva problem  Joint pain   Fatigue  Hair loss  No thyroid disorder  No swallowing problem  + Gestational  Diabetes > 7 y ago  No gestational diabetes or card with the last pregnancy  No pain or itching in eyes  No chest pain or shortness of breath  No abdominal pain, and no diarrhea  No kidney problem  No rash  No pain or numbness in feet  History of endometriosis  ROS otherwise neg except for what is mentioned in the PMH, PSH and HPI    PE:  Vitals:    12/18/18 1040   BP: 116/80   Pulse: 87   Temp: 98.2 °F (36.8 °C)     Alert and oriented  No acute distress  No Proptosis or conjunctivitis  No rash on tongue  No goitre by inspection  Thyroid gland is not palpable  No cervical lymphadenopathy  Heart reg, no gallop  Lungs cta, no wheezing  Abdomen soft with no tenderness  +Abd Obesity without striae  Overweight  No tremors  Speech normal  Behavior normal  No rash or bruises  No acne  No facial hair      Lab:    Lab Results   Component Value Date    TSH 2.403 10/22/2018       No components found for: AGBA1C  Lab Results   Component Value Date    CHOL 177 10/22/2018    TRIG 88 10/22/2018    HDL 52 10/22/2018    CHOLHDL 29.4 10/22/2018    TOTALCHOLEST 3.4 10/22/2018    NONHDLCHOL 125 10/22/2018     BMP  Lab Results   Component Value Date     07/27/2018    K 3.7 07/27/2018     07/27/2018    CO2 25 07/27/2018    BUN 12 07/27/2018    CREATININE  0.8 07/27/2018    CALCIUM 9.2 07/27/2018    ANIONGAP 8 07/27/2018    ESTGFRAFRICA >60.0 07/27/2018    EGFRNONAA >60.0 07/27/2018     No results found for: CREATRANDUR, AGUSTINALBCREAT    A/P:   Chronic fatigue  -     T4, free; Future; Expected date: 12/18/2018  -     T3; Future; Expected date: 12/18/2018  -     TSH; Future; Expected date: 12/18/2018  -     Prolactin; Future; Expected date: 12/18/2018  -     Cortisol, 8AM; Future; Expected date: 12/18/2018  -     21-Hydroxylase Antibodies, Serum; Future; Expected date: 12/18/2018    History of Sjogren's disease  -     T4, free; Future; Expected date: 12/18/2018  -     T3; Future; Expected date: 12/18/2018  -     TSH; Future; Expected date: 12/18/2018  -     Prolactin; Future; Expected date: 12/18/2018  -     Cortisol, 8AM; Future; Expected date: 12/18/2018  -     21-Hydroxylase Antibodies, Serum; Future; Expected date: 12/18/2018    History of gestational diabetes    Hair loss    Weight gain     Last A1c was 4.9  Patient is taking a diet pill.    Hair loss    Weight gain    History of endometriosis   Patient was prescribed birth control pills about 1 month ago.      If blood test results come back normal then follow up visit in one year,  Otherwise visit to discuss results.  If cortisol level is borderline  Then further test will be considered.    Pt understands the plan and instructions.

## 2018-12-19 ENCOUNTER — LAB VISIT (OUTPATIENT)
Dept: LAB | Facility: HOSPITAL | Age: 43
End: 2018-12-19
Attending: INTERNAL MEDICINE
Payer: COMMERCIAL

## 2018-12-19 DIAGNOSIS — R53.82 CHRONIC FATIGUE: ICD-10-CM

## 2018-12-19 DIAGNOSIS — M35.00 HISTORY OF SJOGREN'S DISEASE: ICD-10-CM

## 2018-12-19 LAB
CORTIS SERPL-MCNC: 18.2 UG/DL
PROLACTIN SERPL IA-MCNC: 11.6 NG/ML
T3 SERPL-MCNC: 131 NG/DL
T4 FREE SERPL-MCNC: 0.99 NG/DL
TSH SERPL DL<=0.005 MIU/L-ACNC: 2.22 UIU/ML

## 2018-12-19 PROCEDURE — 86256 FLUORESCENT ANTIBODY TITER: CPT

## 2018-12-19 PROCEDURE — 36415 COLL VENOUS BLD VENIPUNCTURE: CPT | Mod: PO

## 2018-12-19 PROCEDURE — 84439 ASSAY OF FREE THYROXINE: CPT

## 2018-12-19 PROCEDURE — 84480 ASSAY TRIIODOTHYRONINE (T3): CPT

## 2018-12-19 PROCEDURE — 84443 ASSAY THYROID STIM HORMONE: CPT

## 2018-12-19 PROCEDURE — 82533 TOTAL CORTISOL: CPT

## 2018-12-19 PROCEDURE — 84146 ASSAY OF PROLACTIN: CPT

## 2018-12-21 LAB — 21HYDROXYLASE AB SER-ACNC: <1 U/ML

## 2019-01-04 ENCOUNTER — TELEPHONE (OUTPATIENT)
Dept: RHEUMATOLOGY | Facility: CLINIC | Age: 44
End: 2019-01-04

## 2019-01-04 NOTE — TELEPHONE ENCOUNTER
----- Message from Sangita Anaya sent at 1/4/2019 12:05 PM CST -----  Contact: pt  The pt states she is returning a missed call, the pt can be reached at [phone]///[email or web address]

## 2019-03-28 ENCOUNTER — OFFICE VISIT (OUTPATIENT)
Dept: RHEUMATOLOGY | Facility: CLINIC | Age: 44
End: 2019-03-28
Payer: COMMERCIAL

## 2019-03-28 ENCOUNTER — LAB VISIT (OUTPATIENT)
Dept: LAB | Facility: HOSPITAL | Age: 44
End: 2019-03-28
Attending: PHYSICIAN ASSISTANT
Payer: COMMERCIAL

## 2019-03-28 VITALS
TEMPERATURE: 98 F | DIASTOLIC BLOOD PRESSURE: 75 MMHG | BODY MASS INDEX: 29.53 KG/M2 | SYSTOLIC BLOOD PRESSURE: 117 MMHG | WEIGHT: 194.25 LBS | HEART RATE: 103 BPM

## 2019-03-28 DIAGNOSIS — M35.9 XEROSTOMIA DUE TO AUTOIMMUNE DISEASE: ICD-10-CM

## 2019-03-28 DIAGNOSIS — Z51.81 MEDICATION MONITORING ENCOUNTER: Chronic | ICD-10-CM

## 2019-03-28 DIAGNOSIS — K11.7 XEROSTOMIA DUE TO AUTOIMMUNE DISEASE: ICD-10-CM

## 2019-03-28 DIAGNOSIS — R76.8 RHEUMATOID FACTOR POSITIVE: ICD-10-CM

## 2019-03-28 DIAGNOSIS — M25.50 PAIN IN JOINT, MULTIPLE SITES: ICD-10-CM

## 2019-03-28 DIAGNOSIS — M35.01 SJOGREN'S SYNDROME WITH KERATOCONJUNCTIVITIS SICCA: ICD-10-CM

## 2019-03-28 DIAGNOSIS — D50.8 IRON DEFICIENCY ANEMIA SECONDARY TO INADEQUATE DIETARY IRON INTAKE: ICD-10-CM

## 2019-03-28 DIAGNOSIS — M35.01 SJOGREN'S SYNDROME WITH KERATOCONJUNCTIVITIS SICCA: Primary | ICD-10-CM

## 2019-03-28 LAB
ALBUMIN SERPL BCP-MCNC: 3.4 G/DL (ref 3.5–5.2)
ALP SERPL-CCNC: 76 U/L (ref 55–135)
ALT SERPL W/O P-5'-P-CCNC: 35 U/L (ref 10–44)
ANION GAP SERPL CALC-SCNC: 9 MMOL/L (ref 8–16)
AST SERPL-CCNC: 28 U/L (ref 10–40)
BASOPHILS # BLD AUTO: 0.02 K/UL (ref 0–0.2)
BASOPHILS NFR BLD: 0.4 % (ref 0–1.9)
BILIRUB SERPL-MCNC: 0.3 MG/DL (ref 0.1–1)
BUN SERPL-MCNC: 11 MG/DL (ref 6–20)
CALCIUM SERPL-MCNC: 9.3 MG/DL (ref 8.7–10.5)
CHLORIDE SERPL-SCNC: 105 MMOL/L (ref 95–110)
CO2 SERPL-SCNC: 25 MMOL/L (ref 23–29)
CREAT SERPL-MCNC: 0.8 MG/DL (ref 0.5–1.4)
CRP SERPL-MCNC: 16 MG/L (ref 0–8.2)
DIFFERENTIAL METHOD: ABNORMAL
EOSINOPHIL # BLD AUTO: 0.1 K/UL (ref 0–0.5)
EOSINOPHIL NFR BLD: 1.6 % (ref 0–8)
ERYTHROCYTE [DISTWIDTH] IN BLOOD BY AUTOMATED COUNT: 12.2 % (ref 11.5–14.5)
ERYTHROCYTE [SEDIMENTATION RATE] IN BLOOD BY WESTERGREN METHOD: 10 MM/HR (ref 0–36)
EST. GFR  (AFRICAN AMERICAN): >60 ML/MIN/1.73 M^2
EST. GFR  (NON AFRICAN AMERICAN): >60 ML/MIN/1.73 M^2
GLUCOSE SERPL-MCNC: 103 MG/DL (ref 70–110)
HCT VFR BLD AUTO: 32.7 % (ref 37–48.5)
HGB BLD-MCNC: 10.5 G/DL (ref 12–16)
IMM GRANULOCYTES # BLD AUTO: 0.01 K/UL (ref 0–0.04)
IMM GRANULOCYTES NFR BLD AUTO: 0.2 % (ref 0–0.5)
LYMPHOCYTES # BLD AUTO: 1 K/UL (ref 1–4.8)
LYMPHOCYTES NFR BLD: 20.2 % (ref 18–48)
MCH RBC QN AUTO: 28.6 PG (ref 27–31)
MCHC RBC AUTO-ENTMCNC: 32.1 G/DL (ref 32–36)
MCV RBC AUTO: 89 FL (ref 82–98)
MONOCYTES # BLD AUTO: 0.2 K/UL (ref 0.3–1)
MONOCYTES NFR BLD: 4.3 % (ref 4–15)
NEUTROPHILS # BLD AUTO: 3.7 K/UL (ref 1.8–7.7)
NEUTROPHILS NFR BLD: 73.5 % (ref 38–73)
NRBC BLD-RTO: 0 /100 WBC
PLATELET # BLD AUTO: 306 K/UL (ref 150–350)
PMV BLD AUTO: 8.4 FL (ref 9.2–12.9)
POTASSIUM SERPL-SCNC: 3.5 MMOL/L (ref 3.5–5.1)
PROT SERPL-MCNC: 7 G/DL (ref 6–8.4)
RBC # BLD AUTO: 3.67 M/UL (ref 4–5.4)
SODIUM SERPL-SCNC: 139 MMOL/L (ref 136–145)
WBC # BLD AUTO: 5.06 K/UL (ref 3.9–12.7)

## 2019-03-28 PROCEDURE — 85652 RBC SED RATE AUTOMATED: CPT

## 2019-03-28 PROCEDURE — 36415 COLL VENOUS BLD VENIPUNCTURE: CPT

## 2019-03-28 PROCEDURE — 80053 COMPREHEN METABOLIC PANEL: CPT

## 2019-03-28 PROCEDURE — 86235 NUCLEAR ANTIGEN ANTIBODY: CPT | Mod: 59

## 2019-03-28 PROCEDURE — 3008F BODY MASS INDEX DOCD: CPT | Mod: CPTII,S$GLB,, | Performed by: PHYSICIAN ASSISTANT

## 2019-03-28 PROCEDURE — 86038 ANTINUCLEAR ANTIBODIES: CPT

## 2019-03-28 PROCEDURE — 85025 COMPLETE CBC W/AUTO DIFF WBC: CPT

## 2019-03-28 PROCEDURE — 86039 ANTINUCLEAR ANTIBODIES (ANA): CPT

## 2019-03-28 PROCEDURE — 99214 PR OFFICE/OUTPT VISIT, EST, LEVL IV, 30-39 MIN: ICD-10-PCS | Mod: S$GLB,,, | Performed by: PHYSICIAN ASSISTANT

## 2019-03-28 PROCEDURE — 99214 OFFICE O/P EST MOD 30 MIN: CPT | Mod: S$GLB,,, | Performed by: PHYSICIAN ASSISTANT

## 2019-03-28 PROCEDURE — 86140 C-REACTIVE PROTEIN: CPT

## 2019-03-28 PROCEDURE — 3008F PR BODY MASS INDEX (BMI) DOCUMENTED: ICD-10-PCS | Mod: CPTII,S$GLB,, | Performed by: PHYSICIAN ASSISTANT

## 2019-03-28 PROCEDURE — 99999 PR PBB SHADOW E&M-EST. PATIENT-LVL III: CPT | Mod: PBBFAC,,, | Performed by: PHYSICIAN ASSISTANT

## 2019-03-28 PROCEDURE — 99999 PR PBB SHADOW E&M-EST. PATIENT-LVL III: ICD-10-PCS | Mod: PBBFAC,,, | Performed by: PHYSICIAN ASSISTANT

## 2019-03-28 RX ORDER — METHYLPREDNISOLONE 4 MG/1
TABLET ORAL
Qty: 1 PACKAGE | Refills: 0 | Status: SHIPPED | OUTPATIENT
Start: 2019-03-28 | End: 2019-08-26

## 2019-03-28 NOTE — PROGRESS NOTES
Subjective:       Patient ID: Olesya Tay is a 44 y.o. female.    Chief Complaint: Sjogren's (Pain in both wrists for about 1wk)      Olesya is back today for rheumatology follow-up.     She has chronic Sjogren syndrome with a positive SEAMUS, positive SSA, positive SSB, positive rheumatoid factor and dryness.  She also had a mildly positive CCP antibody but no evidence of rheumatoid arthritis.  (last ccp neg) Bilateral hand x-rays showed no erosive changes present.  She did have arthralgias associated with Sjogren's but no synovitis or joint swelling.  Usually mild  morning stiffness~ 20 min  Also fatigued.  She is on  Plaquenil 200 mg bid. This has helped.    re coving from possible flu vs URI with post viral cough, congestions. Had fever, chills, chest pain, cough for green sputum. Starting to feel better after 10 days, did not go to pcp or uc just treated at home  Still some residual fatigue. More joint pain/stiffness memo krystle hands and fingers. Cough better but still present, no fever      She does have some intermittent arthralgias with wax and wane.  Usually takes ibuprofen or Tylenol.  Dry eyes and  dry mouth.   She is using over-the-counter products and drinking water.  Dry eyes on rewetting drops.  At last visit mouth dryness a little worse she had not  tried Salagen or evoxac.  Salagen was added, this has helped. Was on Sjogren study but this closed  Last summer. Not sure she was on placebo versus drug.    Pain level today 7/10 generalized and bilateral shoulders and hands.    Had gained some weight last year, changed her diet, uses prn adipex, she lost about 12 lbs, added exercise a few times a week, her job is very physical.     She did have lymphadenopathy and pulmonary nodules. Saw Dr Alonso the nodules are small.  All her pulmonary testing was stable he'll see her back in 2 years.  No fevers or weight loss. No night sweats.   No sun sensitive rashes.  No chest pain or shortness of  breath.          Review of Systems   Constitutional: Negative.  Negative for activity change, appetite change, chills, fatigue and fever.   HENT: Negative.  Negative for mouth sores and trouble swallowing.         + dry mouth   Eyes: Negative.  Negative for photophobia, pain and redness.        No swollen or red eyes, no dry eye     Respiratory: Positive for cough. Negative for chest tightness, shortness of breath, wheezing and stridor.    Cardiovascular: Negative.  Negative for chest pain.   Gastrointestinal: Negative.  Negative for abdominal pain, blood in stool, diarrhea, nausea and vomiting.   Genitourinary: Negative.  Negative for dysuria, frequency, hematuria and urgency.   Musculoskeletal: Positive for arthralgias and back pain. Negative for gait problem, joint swelling, neck pain and neck stiffness.   Skin: Negative.  Negative for color change, pallor and rash.   Neurological: Negative.  Negative for weakness.   Hematological: Negative for adenopathy.   Psychiatric/Behavioral: Negative for suicidal ideas.         Objective:   /75   Pulse 103   Temp 98.3 °F (36.8 °C)   Wt 88.1 kg (194 lb 3.6 oz)   BMI 29.53 kg/m²      Physical Exam   Constitutional: She is oriented to person, place, and time and well-developed, well-nourished, and in no distress. No distress.   HENT:   Head: Normocephalic and atraumatic.   Right Ear: External ear normal.   Left Ear: External ear normal.   Mouth/Throat: Mucous membranes are not pale, dry and not cyanotic. She does not have dentures. No oral lesions. Normal dentition. No dental abscesses, uvula swelling, lacerations or dental caries. No oropharyngeal exudate or posterior oropharyngeal edema.       Eyes: Conjunctivae and EOM are normal. Pupils are equal, round, and reactive to light. Right eye exhibits no discharge and no exudate. Left eye exhibits no discharge and no exudate. Right conjunctiva is not injected. Right conjunctiva has no hemorrhage. Left conjunctiva is  not injected. Left conjunctiva has no hemorrhage. No scleral icterus.       Neck: Normal range of motion and full passive range of motion without pain. Neck supple. No thyromegaly present.   Cardiovascular: Normal rate, regular rhythm and normal heart sounds.    No murmur heard.  Pulmonary/Chest: Effort normal and breath sounds normal. No respiratory distress. She has no wheezes. She has no rales. She exhibits no tenderness.   Abdominal: Soft. Bowel sounds are normal.       Right Side Rheumatological Exam     Examination finds the shoulder, wrist, knee, 1st PIP, 1st MCP, 2nd PIP, 2nd MCP, 3rd PIP, 3rd MCP, 4th PIP, 4th MCP, 5th PIP and 5th MCP normal.    The patient is tender to palpation of the elbow    Left Side Rheumatological Exam     Examination finds the shoulder, elbow, wrist, knee, 1st PIP, 1st MCP, 2nd PIP, 2nd MCP, 3rd PIP, 3rd MCP, 4th PIP, 4th MCP, 5th PIP and 5th MCP normal.      Lymphadenopathy:        Head (right side): Submandibular adenopathy present. No submental, no tonsillar, no preauricular, no posterior auricular and no occipital adenopathy present.        Head (left side): Submandibular adenopathy present. No submental, no tonsillar, no preauricular, no posterior auricular and no occipital adenopathy present.     She has no cervical adenopathy.        Right cervical: No superficial cervical, no deep cervical and no posterior cervical adenopathy present.       Left cervical: No deep cervical and no posterior cervical adenopathy present.     She has no axillary adenopathy.        Right axillary: No pectoral and no lateral adenopathy present.        Left axillary: No pectoral and no lateral adenopathy present.       Right: No inguinal, no supraclavicular and no epitrochlear adenopathy present.        Left: No inguinal, no supraclavicular and no epitrochlear adenopathy present.   Parotid enlargement krystle  Not TTP  Soft to touch  No redness or masses palpated    Neurological: She is alert and  oriented to person, place, and time. She displays normal reflexes. No cranial nerve deficit. She exhibits normal muscle tone. Gait normal.   Skin: Skin is warm and dry. No rash noted.     Musculoskeletal: Normal range of motion. She exhibits tenderness. She exhibits no edema.   Mild ttp to myofascial points  No synovitis or dactylitis on exam   Mild tenderness krystle wrists             Recent Results (from the past 1344 hour(s))   Urinalysis    Collection Time: 03/28/19 12:40 PM   Result Value Ref Range    Specimen UA Urine, Clean Catch     Color, UA Yellow Yellow, Straw, Opal    Appearance, UA Clear Clear    pH, UA 6.0 5.0 - 8.0    Specific Gravity, UA >=1.030 (A) 1.005 - 1.030    Protein, UA Negative Negative    Glucose, UA Negative Negative    Ketones, UA Negative Negative    Bilirubin (UA) Negative Negative    Occult Blood UA Negative Negative    Nitrite, UA Negative Negative    Leukocytes, UA Negative Negative   C-reactive protein    Collection Time: 03/28/19  1:00 PM   Result Value Ref Range    CRP 16.0 (H) 0.0 - 8.2 mg/L   Comprehensive metabolic panel    Collection Time: 03/28/19  1:00 PM   Result Value Ref Range    Sodium 139 136 - 145 mmol/L    Potassium 3.5 3.5 - 5.1 mmol/L    Chloride 105 95 - 110 mmol/L    CO2 25 23 - 29 mmol/L    Glucose 103 70 - 110 mg/dL    BUN, Bld 11 6 - 20 mg/dL    Creatinine 0.8 0.5 - 1.4 mg/dL    Calcium 9.3 8.7 - 10.5 mg/dL    Total Protein 7.0 6.0 - 8.4 g/dL    Albumin 3.4 (L) 3.5 - 5.2 g/dL    Total Bilirubin 0.3 0.1 - 1.0 mg/dL    Alkaline Phosphatase 76 55 - 135 U/L    AST 28 10 - 40 U/L    ALT 35 10 - 44 U/L    Anion Gap 9 8 - 16 mmol/L    eGFR if African American >60 >60 mL/min/1.73 m^2    eGFR if non African American >60 >60 mL/min/1.73 m^2   CBC auto differential    Collection Time: 03/28/19  1:00 PM   Result Value Ref Range    WBC 5.06 3.90 - 12.70 K/uL    RBC 3.67 (L) 4.00 - 5.40 M/uL    Hemoglobin 10.5 (L) 12.0 - 16.0 g/dL    Hematocrit 32.7 (L) 37.0 - 48.5 %    MCV 89  82 - 98 fL    MCH 28.6 27.0 - 31.0 pg    MCHC 32.1 32.0 - 36.0 g/dL    RDW 12.2 11.5 - 14.5 %    Platelets 306 150 - 350 K/uL    MPV 8.4 (L) 9.2 - 12.9 fL    Immature Granulocytes 0.2 0.0 - 0.5 %    Gran # (ANC) 3.7 1.8 - 7.7 K/uL    Immature Grans (Abs) 0.01 0.00 - 0.04 K/uL    Lymph # 1.0 1.0 - 4.8 K/uL    Mono # 0.2 (L) 0.3 - 1.0 K/uL    Eos # 0.1 0.0 - 0.5 K/uL    Baso # 0.02 0.00 - 0.20 K/uL    nRBC 0 0 /100 WBC    Gran% 73.5 (H) 38.0 - 73.0 %    Lymph% 20.2 18.0 - 48.0 %    Mono% 4.3 4.0 - 15.0 %    Eosinophil% 1.6 0.0 - 8.0 %    Basophil% 0.4 0.0 - 1.9 %    Differential Method Automated          Assessment:       1. Sjogren's disease (+ ALBA 1:640, + SSA, + RF)  with dryness and a few enlarged lymph nodes --worsening dry mouth  Otherwise stable   2. Pulmonary nodule -chronic noted ct chest most recent 2015 seeing pulmonary now       3. Rheumatoid factor positive with Mild + CCP currently no signs of developing RA, no erosive changes  Noted on x-ray krystle hands will continue to monitor - repeat ccp now neg, RF still + --stable on Plaquenil            4. Obesity bmi now 29, recent weight loss     5. Prediabetes- gest diabetes- strong family history wanting to see endo     6. Recent viral syndrome possible flu vs bronchitis     7. Anemia-  Mild decline in H&H since last visit    Plan:         Continue plaquenil BID since nov (plaquenil exposure is 4.76 mg/kg X 18 months), she does get eye checks yearly    Trial of salagen 5 mg bid, for dryness in future can go to 7.5 mg bid if needed, evoxac in the future if fails salagen   continue otc products for dryness, eye drops, oasis and biotene products,      Medrol dose pack for upper resp issues and joint aches  Add daily MV with iron or otc iron supplement once a day      Check ldh, retid, haptoglobin, tibc, ferritin, rf and ccp in 3 weeks    Continue to follow up with pulmonary    rtc 5-6 months with alba, c3, c4, cbc, cmp, esr, crp and ua

## 2019-03-29 LAB
ANA SER QL IF: POSITIVE
ANA TITR SER IF: NORMAL {TITER}

## 2019-04-01 LAB
ANTI SM ANTIBODY: 2.84 EU (ref 0–19.99)
ANTI SM/RNP ANTIBODY: 2.43 EU (ref 0–19.99)
ANTI-SM INTERPRETATION: NEGATIVE
ANTI-SM/RNP INTERPRETATION: NEGATIVE
ANTI-SSA ANTIBODY: 154.3 EU (ref 0–19.99)
ANTI-SSA INTERPRETATION: POSITIVE
ANTI-SSB ANTIBODY: 13.79 EU (ref 0–19.99)
ANTI-SSB INTERPRETATION: NEGATIVE
DSDNA AB SER-ACNC: ABNORMAL [IU]/ML

## 2019-04-26 ENCOUNTER — PATIENT MESSAGE (OUTPATIENT)
Dept: INTERNAL MEDICINE | Facility: CLINIC | Age: 44
End: 2019-04-26

## 2019-04-29 ENCOUNTER — PATIENT MESSAGE (OUTPATIENT)
Dept: INTERNAL MEDICINE | Facility: CLINIC | Age: 44
End: 2019-04-29

## 2019-05-03 ENCOUNTER — PATIENT MESSAGE (OUTPATIENT)
Dept: INTERNAL MEDICINE | Facility: CLINIC | Age: 44
End: 2019-05-03

## 2019-05-29 RX ORDER — PHENTERMINE HYDROCHLORIDE 37.5 MG/1
TABLET ORAL
Qty: 90 TABLET | Refills: 0 | Status: SHIPPED | OUTPATIENT
Start: 2019-05-29 | End: 2021-02-05

## 2019-06-14 ENCOUNTER — PATIENT MESSAGE (OUTPATIENT)
Dept: RHEUMATOLOGY | Facility: CLINIC | Age: 44
End: 2019-06-14

## 2019-06-19 ENCOUNTER — LAB VISIT (OUTPATIENT)
Dept: LAB | Facility: HOSPITAL | Age: 44
End: 2019-06-19
Attending: PHYSICIAN ASSISTANT
Payer: COMMERCIAL

## 2019-06-19 ENCOUNTER — PATIENT MESSAGE (OUTPATIENT)
Dept: RHEUMATOLOGY | Facility: CLINIC | Age: 44
End: 2019-06-19

## 2019-06-19 DIAGNOSIS — M25.50 PAIN IN JOINT, MULTIPLE SITES: ICD-10-CM

## 2019-06-19 DIAGNOSIS — K11.7 XEROSTOMIA DUE TO AUTOIMMUNE DISEASE: ICD-10-CM

## 2019-06-19 DIAGNOSIS — D50.8 IRON DEFICIENCY ANEMIA SECONDARY TO INADEQUATE DIETARY IRON INTAKE: ICD-10-CM

## 2019-06-19 DIAGNOSIS — R76.8 RHEUMATOID FACTOR POSITIVE: ICD-10-CM

## 2019-06-19 DIAGNOSIS — M35.01 SJOGREN'S SYNDROME WITH KERATOCONJUNCTIVITIS SICCA: ICD-10-CM

## 2019-06-19 DIAGNOSIS — Z51.81 MEDICATION MONITORING ENCOUNTER: Chronic | ICD-10-CM

## 2019-06-19 DIAGNOSIS — M35.9 XEROSTOMIA DUE TO AUTOIMMUNE DISEASE: ICD-10-CM

## 2019-06-19 LAB
BASOPHILS # BLD AUTO: 0.03 K/UL (ref 0–0.2)
BASOPHILS NFR BLD: 0.5 % (ref 0–1.9)
CCP AB SER IA-ACNC: <0.5 U/ML
DIFFERENTIAL METHOD: ABNORMAL
EOSINOPHIL # BLD AUTO: 0.1 K/UL (ref 0–0.5)
EOSINOPHIL NFR BLD: 1.9 % (ref 0–8)
ERYTHROCYTE [DISTWIDTH] IN BLOOD BY AUTOMATED COUNT: 13.2 % (ref 11.5–14.5)
FERRITIN SERPL-MCNC: 21 NG/ML (ref 20–300)
HAPTOGLOB SERPL-MCNC: 126 MG/DL (ref 30–250)
HCT VFR BLD AUTO: 37.6 % (ref 37–48.5)
HGB BLD-MCNC: 12.5 G/DL (ref 12–16)
IMM GRANULOCYTES # BLD AUTO: 0.01 K/UL (ref 0–0.04)
IMM GRANULOCYTES NFR BLD AUTO: 0.2 % (ref 0–0.5)
IRON SERPL-MCNC: 48 UG/DL (ref 30–160)
LDH SERPL L TO P-CCNC: 254 U/L (ref 110–260)
LYMPHOCYTES # BLD AUTO: 1.2 K/UL (ref 1–4.8)
LYMPHOCYTES NFR BLD: 19.9 % (ref 18–48)
MCH RBC QN AUTO: 30.4 PG (ref 27–31)
MCHC RBC AUTO-ENTMCNC: 33.2 G/DL (ref 32–36)
MCV RBC AUTO: 92 FL (ref 82–98)
MONOCYTES # BLD AUTO: 0.4 K/UL (ref 0.3–1)
MONOCYTES NFR BLD: 7.4 % (ref 4–15)
NEUTROPHILS # BLD AUTO: 4.1 K/UL (ref 1.8–7.7)
NEUTROPHILS NFR BLD: 70.3 % (ref 38–73)
NRBC BLD-RTO: 0 /100 WBC
PLATELET # BLD AUTO: 297 K/UL (ref 150–350)
PMV BLD AUTO: 8.3 FL (ref 9.2–12.9)
RBC # BLD AUTO: 4.11 M/UL (ref 4–5.4)
RETICS/RBC NFR AUTO: 1.6 % (ref 0.5–2.5)
RHEUMATOID FACT SERPL-ACNC: 22 IU/ML (ref 0–15)
SATURATED IRON: 12 % (ref 20–50)
TOTAL IRON BINDING CAPACITY: 389 UG/DL (ref 250–450)
TRANSFERRIN SERPL-MCNC: 263 MG/DL (ref 200–375)
WBC # BLD AUTO: 5.78 K/UL (ref 3.9–12.7)

## 2019-06-19 PROCEDURE — 83615 LACTATE (LD) (LDH) ENZYME: CPT

## 2019-06-19 PROCEDURE — 85045 AUTOMATED RETICULOCYTE COUNT: CPT

## 2019-06-19 PROCEDURE — 86431 RHEUMATOID FACTOR QUANT: CPT

## 2019-06-19 PROCEDURE — 83010 ASSAY OF HAPTOGLOBIN QUANT: CPT

## 2019-06-19 PROCEDURE — 85025 COMPLETE CBC W/AUTO DIFF WBC: CPT

## 2019-06-19 PROCEDURE — 36415 COLL VENOUS BLD VENIPUNCTURE: CPT

## 2019-06-19 PROCEDURE — 82728 ASSAY OF FERRITIN: CPT

## 2019-06-19 PROCEDURE — 86200 CCP ANTIBODY: CPT

## 2019-06-19 PROCEDURE — 83540 ASSAY OF IRON: CPT

## 2019-06-29 NOTE — TELEPHONE ENCOUNTER
Left voice message to return call to clinic regarding rescheduling her appointment with JONN Boggs at the [facility] location due to provider being out  
No

## 2019-07-25 ENCOUNTER — PATIENT MESSAGE (OUTPATIENT)
Dept: RHEUMATOLOGY | Facility: CLINIC | Age: 44
End: 2019-07-25

## 2019-07-29 ENCOUNTER — PATIENT MESSAGE (OUTPATIENT)
Dept: INTERNAL MEDICINE | Facility: CLINIC | Age: 44
End: 2019-07-29

## 2019-08-26 ENCOUNTER — OFFICE VISIT (OUTPATIENT)
Dept: INTERNAL MEDICINE | Facility: CLINIC | Age: 44
End: 2019-08-26
Payer: COMMERCIAL

## 2019-08-26 VITALS
HEART RATE: 102 BPM | TEMPERATURE: 99 F | SYSTOLIC BLOOD PRESSURE: 122 MMHG | WEIGHT: 196 LBS | OXYGEN SATURATION: 97 % | HEIGHT: 68 IN | BODY MASS INDEX: 29.7 KG/M2 | DIASTOLIC BLOOD PRESSURE: 70 MMHG

## 2019-08-26 DIAGNOSIS — K21.9 GASTROESOPHAGEAL REFLUX DISEASE, ESOPHAGITIS PRESENCE NOT SPECIFIED: ICD-10-CM

## 2019-08-26 DIAGNOSIS — R76.8 RHEUMATOID FACTOR POSITIVE: ICD-10-CM

## 2019-08-26 DIAGNOSIS — M35.9 XEROSTOMIA DUE TO AUTOIMMUNE DISEASE: ICD-10-CM

## 2019-08-26 DIAGNOSIS — R91.8 MULTIPLE LUNG NODULES ON CT: ICD-10-CM

## 2019-08-26 DIAGNOSIS — K11.7 XEROSTOMIA DUE TO AUTOIMMUNE DISEASE: ICD-10-CM

## 2019-08-26 DIAGNOSIS — E55.9 VITAMIN D DEFICIENCY: ICD-10-CM

## 2019-08-26 DIAGNOSIS — L71.9 ROSACEA: ICD-10-CM

## 2019-08-26 DIAGNOSIS — R91.8 MULTIPLE PULMONARY NODULES: ICD-10-CM

## 2019-08-26 DIAGNOSIS — L03.031 ONYCHIA OF TOE OF RIGHT FOOT: ICD-10-CM

## 2019-08-26 DIAGNOSIS — M35.01 SJOGREN'S SYNDROME WITH KERATOCONJUNCTIVITIS SICCA: ICD-10-CM

## 2019-08-26 DIAGNOSIS — Z00.00 ROUTINE GENERAL MEDICAL EXAMINATION AT A HEALTH CARE FACILITY: Primary | ICD-10-CM

## 2019-08-26 PROBLEM — R73.03 PRE-DIABETES: Status: RESOLVED | Noted: 2018-07-27 | Resolved: 2019-08-26

## 2019-08-26 PROBLEM — M35.00 HISTORY OF SJOGREN'S DISEASE: Status: RESOLVED | Noted: 2018-12-18 | Resolved: 2019-08-26

## 2019-08-26 PROCEDURE — 99999 PR PBB SHADOW E&M-EST. PATIENT-LVL III: CPT | Mod: PBBFAC,,, | Performed by: FAMILY MEDICINE

## 2019-08-26 PROCEDURE — 99396 PREV VISIT EST AGE 40-64: CPT | Mod: S$GLB,,, | Performed by: FAMILY MEDICINE

## 2019-08-26 PROCEDURE — 99396 PR PREVENTIVE VISIT,EST,40-64: ICD-10-PCS | Mod: S$GLB,,, | Performed by: FAMILY MEDICINE

## 2019-08-26 PROCEDURE — 99999 PR PBB SHADOW E&M-EST. PATIENT-LVL III: ICD-10-PCS | Mod: PBBFAC,,, | Performed by: FAMILY MEDICINE

## 2019-08-26 NOTE — PROGRESS NOTES
"Subjective:       Patient ID: Olesya Tay is a 44 y.o. female.    Chief Complaint: Annual Exam    44-year-old female patient with Patient Active Problem List:     Sjogren's syndrome with keratoconjunctivitis sicca     Rheumatoid factor positive     Multiple pulmonary nodules     Rosacea     Medication monitoring encounter     Xerostomia due to autoimmune disease     Chronic fatigue     History of gestational diabetes     Hair loss  Reports that she continues to stay tired, has been taking her rheumatoid arthritis medication  Patient stopped taking Pilocarpine as it was making her bladder symptoms worse  Patient denies any chest pain or difficulty breathing, abdominal discomfort nausea vomiting  Has not been exercising lately but will start soon, and reports that she would like to start back taking Adipex which has been prescribed to her in the past  Secondary to multiple lung nodules patient denies any cough or night sweats  Patient reported rash to the right great toenail, but denies any discomfort  Patient would like to know whether she will need hepatitis A and B vaccination, does not have any history of travel, denies any symptoms, would like to know whether she needs one with her autoimmune history      Review of Systems   Constitutional: Positive for fatigue.   Eyes: Negative for visual disturbance.   Respiratory: Negative for shortness of breath.    Cardiovascular: Negative for chest pain and leg swelling.   Gastrointestinal: Negative for abdominal pain, nausea and vomiting.   Musculoskeletal: Negative for myalgias.   Skin: Positive for rash.   Neurological: Negative for light-headedness and headaches.   Psychiatric/Behavioral: Negative for sleep disturbance.         /70 (BP Location: Right arm, Patient Position: Sitting)   Pulse 102   Temp 99.3 °F (37.4 °C) (Tympanic)   Ht 5' 8" (1.727 m)   Wt 88.9 kg (195 lb 15.8 oz)   LMP 08/05/2019 (Approximate)   SpO2 97%   BMI 29.80 kg/m² "   Objective:      Physical Exam   Constitutional: She is oriented to person, place, and time. She appears well-developed and well-nourished.   HENT:   Head: Normocephalic and atraumatic.   Mouth/Throat: Oropharynx is clear and moist.   Cardiovascular: Normal rate, regular rhythm and normal heart sounds.   No murmur heard.  Pulmonary/Chest: Effort normal and breath sounds normal. She has no wheezes.   Abdominal: Soft. Bowel sounds are normal. There is no tenderness.   Musculoskeletal: She exhibits no edema.   Neurological: She is alert and oriented to person, place, and time.   Skin: Skin is warm and dry. No rash noted.   Noted fungal infection to the right great toenail medially   Psychiatric: She has a normal mood and affect.         Assessment/Plan:   1. Routine general medical examination at a health care facility  - CBC auto differential; Future  - Comprehensive metabolic panel; Future  - Lipid panel; Future  - TSH; Future  - Urinalysis; Future  - Hemoglobin A1c; Future  - HEPATITIS PANEL, ACUTE; Future  - Ferritin; Future  Vital signs stable today.  Clinical exam stable  Encouraged to work on lifestyle modifications with low-fat and low-cholesterol diet and exercise 30 min daily  Up-to-date with well-woman exam at [facility] by Dr. Missy tipton  Encouraged to consider getting flu shot when available      2. Sjogren's syndrome with keratoconjunctivitis sicca  3. Rheumatoid factor positive  4. Rosacea  7. Xerostomia due to autoimmune disease  Followed by Rheumatology currently on Plaquenil    5. Gastroesophageal reflux disease, esophagitis presence not specified  Stable and asymptomatic    6. Multiple lung nodules on CT  - CT Chest With Contrast; Future  Patient clinically stable and asymptomatic    Secondary to onychomycosis advised to use Vicks vapor up and continue to use over-the-counter antifungal treatment

## 2019-08-30 ENCOUNTER — PATIENT OUTREACH (OUTPATIENT)
Dept: ADMINISTRATIVE | Facility: HOSPITAL | Age: 44
End: 2019-08-30

## 2019-09-05 ENCOUNTER — PATIENT MESSAGE (OUTPATIENT)
Dept: INTERNAL MEDICINE | Facility: CLINIC | Age: 44
End: 2019-09-05

## 2019-09-06 ENCOUNTER — TELEPHONE (OUTPATIENT)
Dept: RADIOLOGY | Facility: HOSPITAL | Age: 44
End: 2019-09-06

## 2019-09-06 ENCOUNTER — PATIENT MESSAGE (OUTPATIENT)
Dept: INTERNAL MEDICINE | Facility: CLINIC | Age: 44
End: 2019-09-06

## 2019-09-09 ENCOUNTER — LAB VISIT (OUTPATIENT)
Dept: LAB | Facility: HOSPITAL | Age: 44
End: 2019-09-09
Attending: FAMILY MEDICINE
Payer: COMMERCIAL

## 2019-09-09 DIAGNOSIS — Z00.00 ROUTINE GENERAL MEDICAL EXAMINATION AT A HEALTH CARE FACILITY: ICD-10-CM

## 2019-09-09 LAB
BASOPHILS # BLD AUTO: 0.02 K/UL (ref 0–0.2)
BASOPHILS NFR BLD: 0.4 % (ref 0–1.9)
BILIRUB UR QL STRIP: NEGATIVE
CLARITY UR: CLEAR
COLOR UR: YELLOW
DIFFERENTIAL METHOD: ABNORMAL
EOSINOPHIL # BLD AUTO: 0.1 K/UL (ref 0–0.5)
EOSINOPHIL NFR BLD: 1.9 % (ref 0–8)
ERYTHROCYTE [DISTWIDTH] IN BLOOD BY AUTOMATED COUNT: 12.2 % (ref 11.5–14.5)
GLUCOSE UR QL STRIP: NEGATIVE
HCT VFR BLD AUTO: 40.6 % (ref 37–48.5)
HGB BLD-MCNC: 13 G/DL (ref 12–16)
HGB UR QL STRIP: NEGATIVE
IMM GRANULOCYTES # BLD AUTO: 0.02 K/UL (ref 0–0.04)
IMM GRANULOCYTES NFR BLD AUTO: 0.4 % (ref 0–0.5)
KETONES UR QL STRIP: NEGATIVE
LEUKOCYTE ESTERASE UR QL STRIP: NEGATIVE
LYMPHOCYTES # BLD AUTO: 1 K/UL (ref 1–4.8)
LYMPHOCYTES NFR BLD: 20.7 % (ref 18–48)
MCH RBC QN AUTO: 30.4 PG (ref 27–31)
MCHC RBC AUTO-ENTMCNC: 32 G/DL (ref 32–36)
MCV RBC AUTO: 95 FL (ref 82–98)
MONOCYTES # BLD AUTO: 0.4 K/UL (ref 0.3–1)
MONOCYTES NFR BLD: 7.5 % (ref 4–15)
NEUTROPHILS # BLD AUTO: 3.3 K/UL (ref 1.8–7.7)
NEUTROPHILS NFR BLD: 69.1 % (ref 38–73)
NITRITE UR QL STRIP: NEGATIVE
NRBC BLD-RTO: 0 /100 WBC
PH UR STRIP: 6 [PH] (ref 5–8)
PLATELET # BLD AUTO: 329 K/UL (ref 150–350)
PMV BLD AUTO: 8.4 FL (ref 9.2–12.9)
PROT UR QL STRIP: NEGATIVE
RBC # BLD AUTO: 4.27 M/UL (ref 4–5.4)
SP GR UR STRIP: >=1.03 (ref 1–1.03)
URN SPEC COLLECT METH UR: ABNORMAL
WBC # BLD AUTO: 4.83 K/UL (ref 3.9–12.7)

## 2019-09-09 PROCEDURE — 85025 COMPLETE CBC W/AUTO DIFF WBC: CPT

## 2019-09-09 PROCEDURE — 82306 VITAMIN D 25 HYDROXY: CPT

## 2019-09-09 PROCEDURE — 82607 VITAMIN B-12: CPT

## 2019-09-09 PROCEDURE — 84443 ASSAY THYROID STIM HORMONE: CPT

## 2019-09-09 PROCEDURE — 80074 ACUTE HEPATITIS PANEL: CPT

## 2019-09-09 PROCEDURE — 82728 ASSAY OF FERRITIN: CPT

## 2019-09-09 PROCEDURE — 80061 LIPID PANEL: CPT

## 2019-09-09 PROCEDURE — 83036 HEMOGLOBIN GLYCOSYLATED A1C: CPT

## 2019-09-09 PROCEDURE — 80053 COMPREHEN METABOLIC PANEL: CPT

## 2019-09-09 PROCEDURE — 36415 COLL VENOUS BLD VENIPUNCTURE: CPT

## 2019-09-09 PROCEDURE — 81003 URINALYSIS AUTO W/O SCOPE: CPT

## 2019-09-10 ENCOUNTER — PATIENT MESSAGE (OUTPATIENT)
Dept: RHEUMATOLOGY | Facility: CLINIC | Age: 44
End: 2019-09-10

## 2019-09-10 LAB
25(OH)D3+25(OH)D2 SERPL-MCNC: 25 NG/ML (ref 30–96)
ALBUMIN SERPL BCP-MCNC: 3.6 G/DL (ref 3.5–5.2)
ALP SERPL-CCNC: 50 U/L (ref 55–135)
ALT SERPL W/O P-5'-P-CCNC: 12 U/L (ref 10–44)
ANION GAP SERPL CALC-SCNC: 8 MMOL/L (ref 8–16)
AST SERPL-CCNC: 16 U/L (ref 10–40)
BILIRUB SERPL-MCNC: 0.4 MG/DL (ref 0.1–1)
BUN SERPL-MCNC: 11 MG/DL (ref 6–20)
CALCIUM SERPL-MCNC: 9.2 MG/DL (ref 8.7–10.5)
CHLORIDE SERPL-SCNC: 105 MMOL/L (ref 95–110)
CHOLEST SERPL-MCNC: 187 MG/DL (ref 120–199)
CHOLEST/HDLC SERPL: 3.7 {RATIO} (ref 2–5)
CO2 SERPL-SCNC: 27 MMOL/L (ref 23–29)
CREAT SERPL-MCNC: 0.7 MG/DL (ref 0.5–1.4)
EST. GFR  (AFRICAN AMERICAN): >60 ML/MIN/1.73 M^2
EST. GFR  (NON AFRICAN AMERICAN): >60 ML/MIN/1.73 M^2
ESTIMATED AVG GLUCOSE: 94 MG/DL (ref 68–131)
FERRITIN SERPL-MCNC: 29 NG/ML (ref 20–300)
GLUCOSE SERPL-MCNC: 81 MG/DL (ref 70–110)
HAV IGM SERPL QL IA: NEGATIVE
HBA1C MFR BLD HPLC: 4.9 % (ref 4–5.6)
HBV CORE IGM SERPL QL IA: NEGATIVE
HBV SURFACE AG SERPL QL IA: NEGATIVE
HCV AB SERPL QL IA: NEGATIVE
HDLC SERPL-MCNC: 50 MG/DL (ref 40–75)
HDLC SERPL: 26.7 % (ref 20–50)
LDLC SERPL CALC-MCNC: 110.2 MG/DL (ref 63–159)
NONHDLC SERPL-MCNC: 137 MG/DL
POTASSIUM SERPL-SCNC: 3.6 MMOL/L (ref 3.5–5.1)
PROT SERPL-MCNC: 6.8 G/DL (ref 6–8.4)
SODIUM SERPL-SCNC: 140 MMOL/L (ref 136–145)
TRIGL SERPL-MCNC: 134 MG/DL (ref 30–150)
TSH SERPL DL<=0.005 MIU/L-ACNC: 2.16 UIU/ML (ref 0.4–4)
VIT B12 SERPL-MCNC: 533 PG/ML (ref 210–950)

## 2019-09-17 ENCOUNTER — PATIENT MESSAGE (OUTPATIENT)
Dept: INTERNAL MEDICINE | Facility: CLINIC | Age: 44
End: 2019-09-17

## 2019-09-19 ENCOUNTER — PATIENT MESSAGE (OUTPATIENT)
Dept: RHEUMATOLOGY | Facility: CLINIC | Age: 44
End: 2019-09-19

## 2019-09-19 DIAGNOSIS — M35.00 SJOGREN'S DISEASE: ICD-10-CM

## 2019-09-19 RX ORDER — HYDROXYCHLOROQUINE SULFATE 200 MG/1
200 TABLET, FILM COATED ORAL 2 TIMES DAILY
Qty: 180 TABLET | Refills: 6 | Status: SHIPPED | OUTPATIENT
Start: 2019-09-19 | End: 2020-03-20 | Stop reason: SDUPTHER

## 2019-10-02 ENCOUNTER — PATIENT MESSAGE (OUTPATIENT)
Dept: RHEUMATOLOGY | Facility: CLINIC | Age: 44
End: 2019-10-02

## 2019-10-03 ENCOUNTER — TELEPHONE (OUTPATIENT)
Dept: RHEUMATOLOGY | Facility: CLINIC | Age: 44
End: 2019-10-03

## 2019-10-03 NOTE — TELEPHONE ENCOUNTER
----- Message from Jazmin May PA-C sent at 10/3/2019  7:39 AM CDT -----  Pt scheduled 10/4- at [facility] with labs    Push her apt back 3 months to sometime in jan at [facility] w/ her labs

## 2019-11-08 ENCOUNTER — PATIENT MESSAGE (OUTPATIENT)
Dept: RHEUMATOLOGY | Facility: CLINIC | Age: 44
End: 2019-11-08

## 2019-12-31 ENCOUNTER — PATIENT MESSAGE (OUTPATIENT)
Dept: RHEUMATOLOGY | Facility: CLINIC | Age: 44
End: 2019-12-31

## 2019-12-31 ENCOUNTER — PATIENT MESSAGE (OUTPATIENT)
Dept: INTERNAL MEDICINE | Facility: CLINIC | Age: 44
End: 2019-12-31

## 2019-12-31 NOTE — TELEPHONE ENCOUNTER
Dr. Kwon please see message from patient regarding coding. I'm not sure if you can change the coding if not let me know and I will contact the pt and have her contact billing.

## 2020-01-10 ENCOUNTER — LAB VISIT (OUTPATIENT)
Dept: LAB | Facility: HOSPITAL | Age: 45
End: 2020-01-10
Attending: PHYSICIAN ASSISTANT
Payer: COMMERCIAL

## 2020-01-10 ENCOUNTER — PATIENT MESSAGE (OUTPATIENT)
Dept: RHEUMATOLOGY | Facility: CLINIC | Age: 45
End: 2020-01-10

## 2020-01-10 ENCOUNTER — OFFICE VISIT (OUTPATIENT)
Dept: RHEUMATOLOGY | Facility: CLINIC | Age: 45
End: 2020-01-10
Payer: COMMERCIAL

## 2020-01-10 VITALS
DIASTOLIC BLOOD PRESSURE: 72 MMHG | SYSTOLIC BLOOD PRESSURE: 129 MMHG | HEART RATE: 71 BPM | HEIGHT: 68 IN | BODY MASS INDEX: 31.04 KG/M2 | WEIGHT: 204.81 LBS

## 2020-01-10 DIAGNOSIS — R91.8 MULTIPLE PULMONARY NODULES: ICD-10-CM

## 2020-01-10 DIAGNOSIS — Z51.81 MEDICATION MONITORING ENCOUNTER: Chronic | ICD-10-CM

## 2020-01-10 DIAGNOSIS — R76.8 RHEUMATOID FACTOR POSITIVE: ICD-10-CM

## 2020-01-10 DIAGNOSIS — M35.01 SJOGREN'S SYNDROME WITH KERATOCONJUNCTIVITIS SICCA: Primary | ICD-10-CM

## 2020-01-10 DIAGNOSIS — K11.7 XEROSTOMIA DUE TO AUTOIMMUNE DISEASE: ICD-10-CM

## 2020-01-10 DIAGNOSIS — M35.00 SJOGREN'S DISEASE: ICD-10-CM

## 2020-01-10 DIAGNOSIS — M35.9 XEROSTOMIA DUE TO AUTOIMMUNE DISEASE: ICD-10-CM

## 2020-01-10 DIAGNOSIS — M35.01 SJOGREN'S SYNDROME WITH KERATOCONJUNCTIVITIS SICCA: ICD-10-CM

## 2020-01-10 LAB
ALBUMIN SERPL BCP-MCNC: 3.8 G/DL (ref 3.5–5.2)
ALP SERPL-CCNC: 59 U/L (ref 55–135)
ALT SERPL W/O P-5'-P-CCNC: 17 U/L (ref 10–44)
ANION GAP SERPL CALC-SCNC: 8 MMOL/L (ref 8–16)
AST SERPL-CCNC: 21 U/L (ref 10–40)
BASOPHILS # BLD AUTO: 0.02 K/UL (ref 0–0.2)
BASOPHILS NFR BLD: 0.6 % (ref 0–1.9)
BILIRUB SERPL-MCNC: 0.3 MG/DL (ref 0.1–1)
BUN SERPL-MCNC: 8 MG/DL (ref 6–20)
CALCIUM SERPL-MCNC: 9.5 MG/DL (ref 8.7–10.5)
CHLORIDE SERPL-SCNC: 105 MMOL/L (ref 95–110)
CO2 SERPL-SCNC: 28 MMOL/L (ref 23–29)
CREAT SERPL-MCNC: 0.8 MG/DL (ref 0.5–1.4)
CRP SERPL-MCNC: 16.8 MG/L (ref 0–8.2)
DIFFERENTIAL METHOD: ABNORMAL
EOSINOPHIL # BLD AUTO: 0.1 K/UL (ref 0–0.5)
EOSINOPHIL NFR BLD: 3.1 % (ref 0–8)
ERYTHROCYTE [DISTWIDTH] IN BLOOD BY AUTOMATED COUNT: 12 % (ref 11.5–14.5)
ERYTHROCYTE [SEDIMENTATION RATE] IN BLOOD BY WESTERGREN METHOD: 28 MM/HR (ref 0–36)
EST. GFR  (AFRICAN AMERICAN): >60 ML/MIN/1.73 M^2
EST. GFR  (NON AFRICAN AMERICAN): >60 ML/MIN/1.73 M^2
GLUCOSE SERPL-MCNC: 85 MG/DL (ref 70–110)
HCT VFR BLD AUTO: 39.4 % (ref 37–48.5)
HGB BLD-MCNC: 13 G/DL (ref 12–16)
IMM GRANULOCYTES # BLD AUTO: 0.01 K/UL (ref 0–0.04)
IMM GRANULOCYTES NFR BLD AUTO: 0.3 % (ref 0–0.5)
LYMPHOCYTES # BLD AUTO: 1 K/UL (ref 1–4.8)
LYMPHOCYTES NFR BLD: 28.8 % (ref 18–48)
MCH RBC QN AUTO: 30.2 PG (ref 27–31)
MCHC RBC AUTO-ENTMCNC: 33 G/DL (ref 32–36)
MCV RBC AUTO: 92 FL (ref 82–98)
MONOCYTES # BLD AUTO: 0.4 K/UL (ref 0.3–1)
MONOCYTES NFR BLD: 9.8 % (ref 4–15)
NEUTROPHILS # BLD AUTO: 2.1 K/UL (ref 1.8–7.7)
NEUTROPHILS NFR BLD: 57.4 % (ref 38–73)
NRBC BLD-RTO: 0 /100 WBC
PLATELET # BLD AUTO: 294 K/UL (ref 150–350)
PMV BLD AUTO: 8.1 FL (ref 9.2–12.9)
POTASSIUM SERPL-SCNC: 3.8 MMOL/L (ref 3.5–5.1)
PROT SERPL-MCNC: 7.5 G/DL (ref 6–8.4)
RBC # BLD AUTO: 4.3 M/UL (ref 4–5.4)
SODIUM SERPL-SCNC: 141 MMOL/L (ref 136–145)
WBC # BLD AUTO: 3.58 K/UL (ref 3.9–12.7)

## 2020-01-10 PROCEDURE — 85652 RBC SED RATE AUTOMATED: CPT

## 2020-01-10 PROCEDURE — 86235 NUCLEAR ANTIGEN ANTIBODY: CPT | Mod: 59

## 2020-01-10 PROCEDURE — 86160 COMPLEMENT ANTIGEN: CPT

## 2020-01-10 PROCEDURE — 86160 COMPLEMENT ANTIGEN: CPT | Mod: 59

## 2020-01-10 PROCEDURE — 80053 COMPREHEN METABOLIC PANEL: CPT | Mod: PO

## 2020-01-10 PROCEDURE — 3008F BODY MASS INDEX DOCD: CPT | Mod: CPTII,S$GLB,, | Performed by: PHYSICIAN ASSISTANT

## 2020-01-10 PROCEDURE — 99999 PR PBB SHADOW E&M-EST. PATIENT-LVL III: CPT | Mod: PBBFAC,,, | Performed by: PHYSICIAN ASSISTANT

## 2020-01-10 PROCEDURE — 86039 ANTINUCLEAR ANTIBODIES (ANA): CPT

## 2020-01-10 PROCEDURE — 86140 C-REACTIVE PROTEIN: CPT | Mod: PO

## 2020-01-10 PROCEDURE — 99214 OFFICE O/P EST MOD 30 MIN: CPT | Mod: S$GLB,,, | Performed by: PHYSICIAN ASSISTANT

## 2020-01-10 PROCEDURE — 99999 PR PBB SHADOW E&M-EST. PATIENT-LVL III: ICD-10-PCS | Mod: PBBFAC,,, | Performed by: PHYSICIAN ASSISTANT

## 2020-01-10 PROCEDURE — 3008F PR BODY MASS INDEX (BMI) DOCUMENTED: ICD-10-PCS | Mod: CPTII,S$GLB,, | Performed by: PHYSICIAN ASSISTANT

## 2020-01-10 PROCEDURE — 36415 COLL VENOUS BLD VENIPUNCTURE: CPT | Mod: PO

## 2020-01-10 PROCEDURE — 86038 ANTINUCLEAR ANTIBODIES: CPT

## 2020-01-10 PROCEDURE — 85025 COMPLETE CBC W/AUTO DIFF WBC: CPT | Mod: PO

## 2020-01-10 PROCEDURE — 99214 PR OFFICE/OUTPT VISIT, EST, LEVL IV, 30-39 MIN: ICD-10-PCS | Mod: S$GLB,,, | Performed by: PHYSICIAN ASSISTANT

## 2020-01-10 NOTE — PROGRESS NOTES
Subjective:       Patient ID: Olesya Tay is a 44 y.o. female.    Chief Complaint: Disease Management (Sjogren's)      Olesya is back today for rheumatology follow-up.     She has chronic Sjogren syndrome with a positive SEAMUS, positive SSA, positive SSB, positive rheumatoid factor and dryness.  She also had a mildly positive CCP antibody but no evidence of rheumatoid arthritis.  (last ccp neg) Bilateral hand x-rays showed no erosive changes present.  She did have arthralgias associated with Sjogren's but no synovitis or joint swelling.  Usually mild  morning stiffness~ 10-20 min  Also fatigued.      She is on  Plaquenil 200 mg bid. But takes once daily most days, This has helped. Yearly eye checked    She does have some intermittent arthralgias with wax and wane.  Usually takes ibuprofen or Tylenol.  Dry eyes and  dry mouth.   She is using over-the-counter products and drinking water.  Dry eyes on rewetting drops.  At last visit mouth dryness a little worse she had not  tried Salagen or evoxac.  Salagen was added, she had increased urination and bowel movements so stopped.  Was on Sjogren study but this closed  2018. Not sure she was on placebo versus drug.    Pain level today 0/10    Working on her weight, take adipex prn, needs to add exercise     She did have lymphadenopathy and pulmonary nodules. Saw Dr Alonso the nodules are small.  All her pulmonary testing was stable he'll see her back in 2 years.  No fevers or weight loss. No night sweats.   No sun sensitive rashes.  No chest pain or shortness of breath.    Renal stone a few weeks back, passed at home  Mild low vit D on 2K IU a few time per week   Anemia much better with iron supplements, fatigue better as well                She reports no joint swelling. Pertinent negatives include no dysuria, fatigue, fever or trouble swallowing.           Review of Systems   Constitutional: Negative.  Negative for activity change, appetite change, chills, fatigue  "and fever.   HENT: Negative.  Negative for mouth sores and trouble swallowing.         + dry mouth   Eyes: Negative.  Negative for photophobia, pain and redness.        No swollen or red eyes, no dry eye     Respiratory: Negative for cough, chest tightness, shortness of breath, wheezing and stridor.    Cardiovascular: Negative.  Negative for chest pain.   Gastrointestinal: Negative.  Negative for abdominal pain, blood in stool, diarrhea, nausea and vomiting.   Genitourinary: Negative.  Negative for dysuria, frequency, hematuria and urgency.   Musculoskeletal: Negative for arthralgias, back pain, gait problem, joint swelling, neck pain and neck stiffness.   Skin: Negative.  Negative for color change, pallor and rash.   Neurological: Negative.  Negative for weakness.   Hematological: Negative for adenopathy.   Psychiatric/Behavioral: Negative for suicidal ideas.         Objective:   /72   Pulse 71   Ht 5' 8" (1.727 m)   Wt 92.9 kg (204 lb 12.9 oz)   BMI 31.14 kg/m²      Physical Exam   Constitutional: She is oriented to person, place, and time and well-developed, well-nourished, and in no distress. No distress.   HENT:   Head: Normocephalic and atraumatic.   Right Ear: External ear normal.   Left Ear: External ear normal.   Mouth/Throat: Mucous membranes are not pale, dry and not cyanotic. She does not have dentures. No oral lesions. Normal dentition. No dental abscesses, uvula swelling, lacerations or dental caries. No oropharyngeal exudate or posterior oropharyngeal edema.       Eyes: Conjunctivae and EOM are normal. Pupils are equal, round, and reactive to light. Right eye exhibits no discharge and no exudate. Left eye exhibits no discharge and no exudate. Right conjunctiva is not injected. Right conjunctiva has no hemorrhage. Left conjunctiva is not injected. Left conjunctiva has no hemorrhage. No scleral icterus.       Neck: Normal range of motion and full passive range of motion without pain. Neck " supple. No thyromegaly present.   Cardiovascular: Normal rate, regular rhythm and normal heart sounds.    No murmur heard.  Pulmonary/Chest: Effort normal and breath sounds normal. No respiratory distress. She has no wheezes. She has no rales. She exhibits no tenderness.   Abdominal: Soft. Bowel sounds are normal.       Right Side Rheumatological Exam     Examination finds the shoulder, wrist, knee, 1st PIP, 1st MCP, 2nd PIP, 2nd MCP, 3rd PIP, 3rd MCP, 4th PIP, 4th MCP, 5th PIP and 5th MCP normal.    Left Side Rheumatological Exam     Examination finds the shoulder, elbow, wrist, knee, 1st PIP, 1st MCP, 2nd PIP, 2nd MCP, 3rd PIP, 3rd MCP, 4th PIP, 4th MCP, 5th PIP and 5th MCP normal.      Lymphadenopathy:        Head (right side): Submandibular adenopathy present. No submental, no tonsillar, no preauricular, no posterior auricular and no occipital adenopathy present.        Head (left side): Submandibular adenopathy present. No submental, no tonsillar, no preauricular, no posterior auricular and no occipital adenopathy present.     She has no cervical adenopathy.        Right cervical: No superficial cervical, no deep cervical and no posterior cervical adenopathy present.       Left cervical: No deep cervical and no posterior cervical adenopathy present.     She has no axillary adenopathy.        Right axillary: No pectoral and no lateral adenopathy present.        Left axillary: No pectoral and no lateral adenopathy present.       Right: No inguinal, no supraclavicular and no epitrochlear adenopathy present.        Left: No inguinal, no supraclavicular and no epitrochlear adenopathy present.   Parotid enlargement krystle  Not TTP  Soft to touch  No redness or masses palpated    Neurological: She is alert and oriented to person, place, and time. She displays normal reflexes. No cranial nerve deficit. She exhibits normal muscle tone. Gait normal.   Skin: Skin is warm and dry. No rash noted.     Musculoskeletal: Normal  range of motion. She exhibits no edema or tenderness.   Mild ttp to myofascial points  No synovitis or dactylitis on exam   Mild tenderness krystle wrists             Recent Results (from the past 1344 hour(s))   Urinalysis    Collection Time: 01/10/20  8:09 AM   Result Value Ref Range    Specimen UA Urine, Clean Catch     Color, UA Yellow Yellow, Straw, Opal    Appearance, UA Clear Clear    pH, UA 8.0 5.0 - 8.0    Specific Gravity, UA 1.010 1.005 - 1.030    Protein, UA Negative Negative    Glucose, UA Negative Negative    Ketones, UA Negative Negative    Bilirubin (UA) Negative Negative    Occult Blood UA 3+ (A) Negative    Nitrite, UA Negative Negative    Urobilinogen, UA Negative <2.0 EU/dL    Leukocytes, UA Negative Negative   Urinalysis Microscopic    Collection Time: 01/10/20  8:09 AM   Result Value Ref Range    RBC, UA 20 (H) 0 - 4 /hpf    Squam Epithel, UA 1 /hpf    Microscopic Comment SEE COMMENT    CBC auto differential    Collection Time: 01/10/20  8:24 AM   Result Value Ref Range    WBC 3.58 (L) 3.90 - 12.70 K/uL    RBC 4.30 4.00 - 5.40 M/uL    Hemoglobin 13.0 12.0 - 16.0 g/dL    Hematocrit 39.4 37.0 - 48.5 %    Mean Corpuscular Volume 92 82 - 98 fL    Mean Corpuscular Hemoglobin 30.2 27.0 - 31.0 pg    Mean Corpuscular Hemoglobin Conc 33.0 32.0 - 36.0 g/dL    RDW 12.0 11.5 - 14.5 %    Platelets 294 150 - 350 K/uL    MPV 8.1 (L) 9.2 - 12.9 fL    Immature Granulocytes 0.3 0.0 - 0.5 %    Gran # (ANC) 2.1 1.8 - 7.7 K/uL    Immature Grans (Abs) 0.01 0.00 - 0.04 K/uL    Lymph # 1.0 1.0 - 4.8 K/uL    Mono # 0.4 0.3 - 1.0 K/uL    Eos # 0.1 0.0 - 0.5 K/uL    Baso # 0.02 0.00 - 0.20 K/uL    nRBC 0 0 /100 WBC    Gran% 57.4 38.0 - 73.0 %    Lymph% 28.8 18.0 - 48.0 %    Mono% 9.8 4.0 - 15.0 %    Eosinophil% 3.1 0.0 - 8.0 %    Basophil% 0.6 0.0 - 1.9 %    Differential Method Automated          Assessment:       1. Sjogren's disease (+ SEAMUS 1:640, + SSA, + RF)  with dryness and a few enlarged lymph nodes --worsening dry  mouth  Otherwise stable   2. Pulmonary nodule -chronic noted ct chest most recent 2015 seeing pulmonary now       3. Rheumatoid factor positive with Mild + CCP currently no signs of developing RA, no erosive changes  Noted on x-ray krystle hands will continue to monitor - repeat ccp now neg, RF still + --stable on Plaquenil            4. Obesity bmi now 29, recent weight loss     5. Prediabetes- gest diabetes- strong family history wanting to see endo     6. Mild low vit D - now on D3 2000 IU 2-3 X per week  prior renal stone   So best to stay low dose vit D   And watch Ca intake     7. Anemia-  ;ast year had decline in H&H - much better on iron supplements     Plan:         Continue plaquenil once to twice daily   (plaquenil exposure is 4.76 mg/kg X 18 months), she does get eye checks yearly      continue otc products for dryness, eye drops, oasis and biotene products,  Failed salagen, will hold off on evoxac for now    C/w  Vit D 2000 IU 2-3 time per week,  iron supplement once a day      Continue to follow up with pulmonary    Ok to refill adipex- need to loose 10 lb by next visit to c/w   Exercise and diet  mediterranean diet    rtc 6 months with alba, c3, c4, cbc, cmp, esr, crp and ua

## 2020-01-11 LAB
C3 SERPL-MCNC: 146 MG/DL (ref 50–180)
C4 SERPL-MCNC: 22 MG/DL (ref 11–44)

## 2020-01-13 LAB
ANA SER QL IF: POSITIVE
ANA TITR SER IF: NORMAL {TITER}

## 2020-01-14 LAB
ANTI SM ANTIBODY: 3.51 EU (ref 0–19.99)
ANTI SM/RNP ANTIBODY: 2.37 EU (ref 0–19.99)
ANTI-SM INTERPRETATION: NEGATIVE
ANTI-SM/RNP INTERPRETATION: NEGATIVE
ANTI-SSA ANTIBODY: 141.16 EU (ref 0–19.99)
ANTI-SSA INTERPRETATION: POSITIVE
ANTI-SSB ANTIBODY: 51.79 EU (ref 0–19.99)
ANTI-SSB INTERPRETATION: POSITIVE
DSDNA AB SER-ACNC: ABNORMAL [IU]/ML

## 2020-01-20 ENCOUNTER — LAB VISIT (OUTPATIENT)
Dept: LAB | Facility: HOSPITAL | Age: 45
End: 2020-01-20
Attending: PHYSICIAN ASSISTANT
Payer: COMMERCIAL

## 2020-01-20 ENCOUNTER — PATIENT MESSAGE (OUTPATIENT)
Dept: INTERNAL MEDICINE | Facility: CLINIC | Age: 45
End: 2020-01-20

## 2020-01-20 DIAGNOSIS — Z51.81 MEDICATION MONITORING ENCOUNTER: Chronic | ICD-10-CM

## 2020-01-20 DIAGNOSIS — M35.01 SJOGREN'S SYNDROME WITH KERATOCONJUNCTIVITIS SICCA: ICD-10-CM

## 2020-01-20 DIAGNOSIS — R76.8 RHEUMATOID FACTOR POSITIVE: ICD-10-CM

## 2020-01-20 DIAGNOSIS — M35.9 XEROSTOMIA DUE TO AUTOIMMUNE DISEASE: ICD-10-CM

## 2020-01-20 DIAGNOSIS — K11.7 XEROSTOMIA DUE TO AUTOIMMUNE DISEASE: ICD-10-CM

## 2020-01-20 LAB
BILIRUB UR QL STRIP: NEGATIVE
CLARITY UR REFRACT.AUTO: CLEAR
COLOR UR AUTO: YELLOW
GLUCOSE UR QL STRIP: NEGATIVE
HGB UR QL STRIP: NEGATIVE
KETONES UR QL STRIP: NEGATIVE
LEUKOCYTE ESTERASE UR QL STRIP: NEGATIVE
NITRITE UR QL STRIP: NEGATIVE
PH UR STRIP: 8 [PH] (ref 5–8)
PROT UR QL STRIP: NEGATIVE
SP GR UR STRIP: 1.01 (ref 1–1.03)
URN SPEC COLLECT METH UR: NORMAL
UROBILINOGEN UR STRIP-ACNC: NEGATIVE EU/DL

## 2020-01-20 PROCEDURE — 81003 URINALYSIS AUTO W/O SCOPE: CPT | Mod: PO

## 2020-01-21 ENCOUNTER — PATIENT MESSAGE (OUTPATIENT)
Dept: RHEUMATOLOGY | Facility: CLINIC | Age: 45
End: 2020-01-21

## 2020-03-19 ENCOUNTER — PATIENT MESSAGE (OUTPATIENT)
Dept: RHEUMATOLOGY | Facility: CLINIC | Age: 45
End: 2020-03-19

## 2020-03-19 DIAGNOSIS — M35.00 SJOGREN'S DISEASE: ICD-10-CM

## 2020-03-20 DIAGNOSIS — M35.00 SJOGREN'S DISEASE: ICD-10-CM

## 2020-03-20 RX ORDER — HYDROXYCHLOROQUINE SULFATE 200 MG/1
200 TABLET, FILM COATED ORAL 2 TIMES DAILY
Qty: 180 TABLET | Refills: 6 | Status: SHIPPED | OUTPATIENT
Start: 2020-03-20 | End: 2021-03-11 | Stop reason: SDUPTHER

## 2020-04-21 ENCOUNTER — PATIENT MESSAGE (OUTPATIENT)
Dept: INTERNAL MEDICINE | Facility: CLINIC | Age: 45
End: 2020-04-21

## 2020-05-14 ENCOUNTER — PATIENT MESSAGE (OUTPATIENT)
Dept: RHEUMATOLOGY | Facility: CLINIC | Age: 45
End: 2020-05-14

## 2020-05-18 ENCOUNTER — PATIENT MESSAGE (OUTPATIENT)
Dept: INTERNAL MEDICINE | Facility: CLINIC | Age: 45
End: 2020-05-18

## 2020-07-09 ENCOUNTER — PATIENT OUTREACH (OUTPATIENT)
Dept: ADMINISTRATIVE | Facility: OTHER | Age: 45
End: 2020-07-09

## 2020-07-09 DIAGNOSIS — Z12.31 BREAST CANCER SCREENING BY MAMMOGRAM: Primary | ICD-10-CM

## 2020-07-09 NOTE — PROGRESS NOTES
Chart reviewed.   Immunizations: Triggered Imm Registry     Orders placed: Mammo w/bouchra   Upcoming appts to satisfy THERESE topics: n/a

## 2020-07-10 ENCOUNTER — LAB VISIT (OUTPATIENT)
Dept: LAB | Facility: HOSPITAL | Age: 45
End: 2020-07-10
Attending: PHYSICIAN ASSISTANT
Payer: COMMERCIAL

## 2020-07-10 ENCOUNTER — OFFICE VISIT (OUTPATIENT)
Dept: RHEUMATOLOGY | Facility: CLINIC | Age: 45
End: 2020-07-10
Payer: COMMERCIAL

## 2020-07-10 VITALS
WEIGHT: 199.5 LBS | SYSTOLIC BLOOD PRESSURE: 118 MMHG | DIASTOLIC BLOOD PRESSURE: 78 MMHG | HEART RATE: 83 BPM | BODY MASS INDEX: 30.23 KG/M2 | HEIGHT: 68 IN

## 2020-07-10 DIAGNOSIS — R76.8 RHEUMATOID FACTOR POSITIVE: ICD-10-CM

## 2020-07-10 DIAGNOSIS — M35.9 XEROSTOMIA DUE TO AUTOIMMUNE DISEASE: ICD-10-CM

## 2020-07-10 DIAGNOSIS — M35.01 SJOGREN'S SYNDROME WITH KERATOCONJUNCTIVITIS SICCA: Primary | ICD-10-CM

## 2020-07-10 DIAGNOSIS — Z51.81 MEDICATION MONITORING ENCOUNTER: Chronic | ICD-10-CM

## 2020-07-10 DIAGNOSIS — K11.7 XEROSTOMIA DUE TO AUTOIMMUNE DISEASE: ICD-10-CM

## 2020-07-10 DIAGNOSIS — M35.01 SJOGREN'S SYNDROME WITH KERATOCONJUNCTIVITIS SICCA: ICD-10-CM

## 2020-07-10 LAB
ALBUMIN SERPL BCP-MCNC: 3.7 G/DL (ref 3.5–5.2)
ALP SERPL-CCNC: 60 U/L (ref 55–135)
ALT SERPL W/O P-5'-P-CCNC: 17 U/L (ref 10–44)
ANION GAP SERPL CALC-SCNC: 7 MMOL/L (ref 8–16)
AST SERPL-CCNC: 18 U/L (ref 10–40)
BASOPHILS # BLD AUTO: 0.02 K/UL (ref 0–0.2)
BASOPHILS NFR BLD: 0.4 % (ref 0–1.9)
BILIRUB SERPL-MCNC: 0.3 MG/DL (ref 0.1–1)
BUN SERPL-MCNC: 10 MG/DL (ref 6–20)
CALCIUM SERPL-MCNC: 8.9 MG/DL (ref 8.7–10.5)
CHLORIDE SERPL-SCNC: 106 MMOL/L (ref 95–110)
CO2 SERPL-SCNC: 26 MMOL/L (ref 23–29)
CREAT SERPL-MCNC: 0.8 MG/DL (ref 0.5–1.4)
CRP SERPL-MCNC: 4.1 MG/L (ref 0–8.2)
DIFFERENTIAL METHOD: ABNORMAL
EOSINOPHIL # BLD AUTO: 0.1 K/UL (ref 0–0.5)
EOSINOPHIL NFR BLD: 1.5 % (ref 0–8)
ERYTHROCYTE [DISTWIDTH] IN BLOOD BY AUTOMATED COUNT: 12.4 % (ref 11.5–14.5)
ERYTHROCYTE [SEDIMENTATION RATE] IN BLOOD BY WESTERGREN METHOD: 14 MM/HR (ref 0–36)
EST. GFR  (AFRICAN AMERICAN): >60 ML/MIN/1.73 M^2
EST. GFR  (NON AFRICAN AMERICAN): >60 ML/MIN/1.73 M^2
GLUCOSE SERPL-MCNC: 91 MG/DL (ref 70–110)
HCT VFR BLD AUTO: 40.9 % (ref 37–48.5)
HGB BLD-MCNC: 13.5 G/DL (ref 12–16)
IMM GRANULOCYTES # BLD AUTO: 0.01 K/UL (ref 0–0.04)
IMM GRANULOCYTES NFR BLD AUTO: 0.2 % (ref 0–0.5)
LYMPHOCYTES # BLD AUTO: 1.4 K/UL (ref 1–4.8)
LYMPHOCYTES NFR BLD: 25.3 % (ref 18–48)
MCH RBC QN AUTO: 30.8 PG (ref 27–31)
MCHC RBC AUTO-ENTMCNC: 33 G/DL (ref 32–36)
MCV RBC AUTO: 93 FL (ref 82–98)
MONOCYTES # BLD AUTO: 0.6 K/UL (ref 0.3–1)
MONOCYTES NFR BLD: 10.6 % (ref 4–15)
NEUTROPHILS # BLD AUTO: 3.4 K/UL (ref 1.8–7.7)
NEUTROPHILS NFR BLD: 62 % (ref 38–73)
NRBC BLD-RTO: 0 /100 WBC
PLATELET # BLD AUTO: 288 K/UL (ref 150–350)
PMV BLD AUTO: 8.2 FL (ref 9.2–12.9)
POTASSIUM SERPL-SCNC: 4.1 MMOL/L (ref 3.5–5.1)
PROT SERPL-MCNC: 7.2 G/DL (ref 6–8.4)
RBC # BLD AUTO: 4.39 M/UL (ref 4–5.4)
SODIUM SERPL-SCNC: 139 MMOL/L (ref 136–145)
WBC # BLD AUTO: 5.49 K/UL (ref 3.9–12.7)

## 2020-07-10 PROCEDURE — 86140 C-REACTIVE PROTEIN: CPT | Mod: PO

## 2020-07-10 PROCEDURE — 36415 COLL VENOUS BLD VENIPUNCTURE: CPT | Mod: PO

## 2020-07-10 PROCEDURE — 3008F PR BODY MASS INDEX (BMI) DOCUMENTED: ICD-10-PCS | Mod: CPTII,S$GLB,, | Performed by: PHYSICIAN ASSISTANT

## 2020-07-10 PROCEDURE — 3008F BODY MASS INDEX DOCD: CPT | Mod: CPTII,S$GLB,, | Performed by: PHYSICIAN ASSISTANT

## 2020-07-10 PROCEDURE — 85025 COMPLETE CBC W/AUTO DIFF WBC: CPT | Mod: PO

## 2020-07-10 PROCEDURE — 99999 PR PBB SHADOW E&M-EST. PATIENT-LVL III: CPT | Mod: PBBFAC,,, | Performed by: PHYSICIAN ASSISTANT

## 2020-07-10 PROCEDURE — 85652 RBC SED RATE AUTOMATED: CPT

## 2020-07-10 PROCEDURE — 99214 OFFICE O/P EST MOD 30 MIN: CPT | Mod: S$GLB,,, | Performed by: PHYSICIAN ASSISTANT

## 2020-07-10 PROCEDURE — 80053 COMPREHEN METABOLIC PANEL: CPT | Mod: PO

## 2020-07-10 PROCEDURE — 99214 PR OFFICE/OUTPT VISIT, EST, LEVL IV, 30-39 MIN: ICD-10-PCS | Mod: S$GLB,,, | Performed by: PHYSICIAN ASSISTANT

## 2020-07-10 PROCEDURE — 99999 PR PBB SHADOW E&M-EST. PATIENT-LVL III: ICD-10-PCS | Mod: PBBFAC,,, | Performed by: PHYSICIAN ASSISTANT

## 2020-07-10 NOTE — PROGRESS NOTES
Subjective:       Patient ID: Olesya Tay is a 45 y.o. female.    Chief Complaint: sjogrens syndrome      Olesya is back today for rheumatology follow-up.     She has chronic Sjogren syndrome with a positive SEAMUS, positive SSA, positive SSB, positive rheumatoid factor and dryness.  She also had a mildly positive CCP antibody but no evidence of rheumatoid arthritis.  (last ccp neg) Bilateral hand x-rays showed no erosive changes present.  She did have arthralgias associated with Sjogren's but no synovitis or joint swelling.  Usually mild  morning stiffness~ 10-20 min; chronic fatigue      She is on  Plaquenil 200 mg bid; better compliance w/bid dosing the past 4-6 months; Yearly eye checked    She does have some intermittent arthralgias with wax and wane.  Usually takes ibuprofen or Tylenol.  Dry eyes and  dry mouth.   She is using over-the-counter products and drinking water.  Dry eyes on rewetting drops.  At last visit mouth dryness a little worse she had not  tried Salagen or evoxac.  Salagen was added, she had increased urination and bowel movements so stopped.  Was on Sjogren study but this closed  2018. Not sure she was on placebo versus drug.    Pain level today 0/10    Working on her weight, take adipex prn, did start walking  Lost about 10 lbs since last visit    She did have lymphadenopathy and pulmonary nodules. Saw Dr Alonso the nodules are small.  All her pulmonary testing was stable he'll see her back in 2 years.  No fevers or weight loss. No night sweats.   No sun sensitive rashes.  No chest pain or shortness of breath.    Renal stone a few weeks back, passed at home  Mild low vit D on 2K IU a few time per week   Anemia much better with iron supplements, fatigue better as well                She reports no joint swelling. Pertinent negatives include no dysuria, fatigue, fever or trouble swallowing.           Review of Systems   Constitutional: Negative.  Negative for activity change, appetite  "change, chills, fatigue and fever.   HENT: Negative.  Negative for mouth sores and trouble swallowing.         + dry mouth   Eyes: Negative.  Negative for photophobia, pain and redness.        No swollen or red eyes, no dry eye     Respiratory: Negative for cough, chest tightness, shortness of breath, wheezing and stridor.    Cardiovascular: Negative.  Negative for chest pain.   Gastrointestinal: Negative.  Negative for abdominal pain, blood in stool, diarrhea, nausea and vomiting.   Genitourinary: Negative.  Negative for dysuria, frequency, hematuria and urgency.   Musculoskeletal: Negative for arthralgias, back pain, gait problem, joint swelling, neck pain and neck stiffness.   Skin: Negative.  Negative for color change, pallor and rash.   Neurological: Negative.  Negative for weakness.   Hematological: Negative for adenopathy.   Psychiatric/Behavioral: Negative for suicidal ideas.         Objective:   /78   Pulse 83   Ht 5' 8" (1.727 m)   Wt 90.5 kg (199 lb 8.3 oz)   BMI 30.34 kg/m²      Physical Exam   Constitutional: She is oriented to person, place, and time and well-developed, well-nourished, and in no distress. No distress.   HENT:   Head: Normocephalic and atraumatic.   Right Ear: External ear normal.   Left Ear: External ear normal.   Mouth/Throat: Mucous membranes are not pale, dry and not cyanotic. She does not have dentures. No oral lesions. Normal dentition. No dental abscesses, uvula swelling, lacerations or dental caries. No oropharyngeal exudate or posterior oropharyngeal edema.       Eyes: Conjunctivae and EOM are normal. Pupils are equal, round, and reactive to light. Right eye exhibits no discharge and no exudate. Left eye exhibits no discharge and no exudate. Right conjunctiva is not injected. Right conjunctiva has no hemorrhage. Left conjunctiva is not injected. Left conjunctiva has no hemorrhage. No scleral icterus.       Neck: Normal range of motion and full passive range of motion " without pain. Neck supple. No thyromegaly present.   Cardiovascular: Normal rate, regular rhythm and normal heart sounds.    No murmur heard.  Pulmonary/Chest: Effort normal and breath sounds normal. No respiratory distress. She has no wheezes. She has no rales. She exhibits no tenderness.   Abdominal: Soft. Bowel sounds are normal.       Right Side Rheumatological Exam     Examination finds the shoulder, wrist, knee, 1st PIP, 1st MCP, 2nd PIP, 2nd MCP, 3rd PIP, 3rd MCP, 4th PIP, 4th MCP, 5th PIP and 5th MCP normal.    Left Side Rheumatological Exam     Examination finds the shoulder, elbow, wrist, knee, 1st PIP, 1st MCP, 2nd PIP, 2nd MCP, 3rd PIP, 3rd MCP, 4th PIP, 4th MCP, 5th PIP and 5th MCP normal.      Lymphadenopathy:        Head (right side): Submandibular adenopathy present. No submental, no tonsillar, no preauricular, no posterior auricular and no occipital adenopathy present.        Head (left side): Submandibular adenopathy present. No submental, no tonsillar, no preauricular, no posterior auricular and no occipital adenopathy present.     She has no cervical adenopathy.        Right cervical: No superficial cervical, no deep cervical and no posterior cervical adenopathy present.       Left cervical: No deep cervical and no posterior cervical adenopathy present.     She has no axillary adenopathy.        Right axillary: No pectoral and no lateral adenopathy present.        Left axillary: No pectoral and no lateral adenopathy present.       Right: No supraclavicular and no epitrochlear adenopathy present.        Left: No supraclavicular and no epitrochlear adenopathy present.   Parotid enlargement krystle  Not TTP  Soft to touch  No redness or masses palpated    Neurological: She is alert and oriented to person, place, and time. She displays normal reflexes. No cranial nerve deficit. She exhibits normal muscle tone. Gait normal.   Skin: Skin is warm and dry. No rash noted.     Musculoskeletal: Normal range of  motion. No tenderness or edema.      Comments: Mild ttp to myofascial points  No synovitis or dactylitis on exam   Mild tenderness krystle wrists             Recent Results (from the past 1344 hour(s))   C-reactive protein    Collection Time: 07/10/20 10:15 AM   Result Value Ref Range    CRP 4.1 0.0 - 8.2 mg/L   Comprehensive metabolic panel    Collection Time: 07/10/20 10:15 AM   Result Value Ref Range    Sodium 139 136 - 145 mmol/L    Potassium 4.1 3.5 - 5.1 mmol/L    Chloride 106 95 - 110 mmol/L    CO2 26 23 - 29 mmol/L    Glucose 91 70 - 110 mg/dL    BUN, Bld 10 6 - 20 mg/dL    Creatinine 0.8 0.5 - 1.4 mg/dL    Calcium 8.9 8.7 - 10.5 mg/dL    Total Protein 7.2 6.0 - 8.4 g/dL    Albumin 3.7 3.5 - 5.2 g/dL    Total Bilirubin 0.3 0.1 - 1.0 mg/dL    Alkaline Phosphatase 60 55 - 135 U/L    AST 18 10 - 40 U/L    ALT 17 10 - 44 U/L    Anion Gap 7 (L) 8 - 16 mmol/L    eGFR if African American >60.0 >60 mL/min/1.73 m^2    eGFR if non African American >60.0 >60 mL/min/1.73 m^2   CBC auto differential    Collection Time: 07/10/20 10:15 AM   Result Value Ref Range    WBC 5.49 3.90 - 12.70 K/uL    RBC 4.39 4.00 - 5.40 M/uL    Hemoglobin 13.5 12.0 - 16.0 g/dL    Hematocrit 40.9 37.0 - 48.5 %    Mean Corpuscular Volume 93 82 - 98 fL    Mean Corpuscular Hemoglobin 30.8 27.0 - 31.0 pg    Mean Corpuscular Hemoglobin Conc 33.0 32.0 - 36.0 g/dL    RDW 12.4 11.5 - 14.5 %    Platelets 288 150 - 350 K/uL    MPV 8.2 (L) 9.2 - 12.9 fL    Immature Granulocytes 0.2 0.0 - 0.5 %    Gran # (ANC) 3.4 1.8 - 7.7 K/uL    Immature Grans (Abs) 0.01 0.00 - 0.04 K/uL    Lymph # 1.4 1.0 - 4.8 K/uL    Mono # 0.6 0.3 - 1.0 K/uL    Eos # 0.1 0.0 - 0.5 K/uL    Baso # 0.02 0.00 - 0.20 K/uL    nRBC 0 0 /100 WBC    Gran% 62.0 38.0 - 73.0 %    Lymph% 25.3 18.0 - 48.0 %    Mono% 10.6 4.0 - 15.0 %    Eosinophil% 1.5 0.0 - 8.0 %    Basophil% 0.4 0.0 - 1.9 %    Differential Method Automated          Assessment:       1. Sjogren's disease (+ SEAMUS 1:640, + SSA, + RF)   with dryness and a few enlarged lymph nodes --dry eyes and dry mouth   No extraglandular manifestations  No end organ damage    2. Pulmonary nodule -chronic noted ct chest most recent 2015 seeing pulmonary now       3. Rheumatoid factor positive with Mild + CCP currently no signs of developing RA, no erosive changes  Noted on x-ray krystle hands will continue to monitor - repeat ccp now neg, RF still + --stable on Plaquenil            4. Obesity bmi now 29, recent weight loss     5. Mild low vit D - now on D3 2000 IU 2-3 X per week  prior renal stone   So best to stay low dose vit D   And watch Ca intake     6. Anemia-  ;ast year had decline in H&H - much better on iron supplements     Plan:         Continue plaquenil once to twice daily   (plaquenil exposure is 4.76 mg/kg X 18 months), she does get eye checks yearly      continue otc products for dryness, eye drops, oasis and biotene products,  Failed salagen, will hold off on evoxac for now    C/w  Vit D 2000 IU 2-3 time per week; prior kidney stones so will tolerate lower vit D level  C/w  iron supplement once a day      Continue to follow up with pulmonary    Ok to refill adipex- need to loose 10 lb by next visit to c/w   Exercise and diet  mediterranean diet    rtc 6 months with alba, cbc, cmp, esr, crp and ua  And vit D

## 2020-09-08 ENCOUNTER — TELEPHONE (OUTPATIENT)
Dept: INTERNAL MEDICINE | Facility: CLINIC | Age: 45
End: 2020-09-08

## 2020-09-08 NOTE — TELEPHONE ENCOUNTER
----- Message from Grant Flores sent at 9/8/2020  1:32 PM CDT -----  Regarding: Preventive care  Contact: CHEIKH / Silvia SALAMANCA is calling the staff regarding  pt health Insurance .    CHEIKH is questing the reason for the lab that was done at [facility]     Pt stated that it was a medical reason. The insurance is requesting a reason and a diagnosis   Code in order for the insurance to pay for the lab      Pt call back to be advised [phone]    Thanks

## 2020-09-22 ENCOUNTER — TELEPHONE (OUTPATIENT)
Dept: INTERNAL MEDICINE | Facility: CLINIC | Age: 45
End: 2020-09-22

## 2020-09-22 NOTE — TELEPHONE ENCOUNTER
----- Message from Katherine Sebastian sent at 9/22/2020 11:29 AM CDT -----  Type:  Patient Returning Call    Who Called:patient  Who Left Message for Patient:Pelon  Does the patient know what this is regarding?:na  Would the patient rather a call back or a response via [email or web address]? Call back  Best Call Back Number:[phone]  Additional Information: na

## 2020-10-06 ENCOUNTER — PATIENT MESSAGE (OUTPATIENT)
Dept: ADMINISTRATIVE | Facility: HOSPITAL | Age: 45
End: 2020-10-06

## 2020-12-15 ENCOUNTER — PATIENT MESSAGE (OUTPATIENT)
Dept: RHEUMATOLOGY | Facility: CLINIC | Age: 45
End: 2020-12-15

## 2021-02-04 ENCOUNTER — PATIENT MESSAGE (OUTPATIENT)
Dept: RHEUMATOLOGY | Facility: CLINIC | Age: 46
End: 2021-02-04

## 2021-02-04 DIAGNOSIS — Z01.84 IMMUNITY STATUS TESTING: Primary | ICD-10-CM

## 2021-02-17 ENCOUNTER — PATIENT MESSAGE (OUTPATIENT)
Dept: RHEUMATOLOGY | Facility: CLINIC | Age: 46
End: 2021-02-17

## 2021-02-19 ENCOUNTER — OFFICE VISIT (OUTPATIENT)
Dept: DERMATOLOGY | Facility: CLINIC | Age: 46
End: 2021-02-19
Payer: COMMERCIAL

## 2021-02-19 ENCOUNTER — PATIENT MESSAGE (OUTPATIENT)
Dept: INTERNAL MEDICINE | Facility: CLINIC | Age: 46
End: 2021-02-19

## 2021-02-19 DIAGNOSIS — L71.8 OCULAR ROSACEA: ICD-10-CM

## 2021-02-19 DIAGNOSIS — L71.9 ROSACEA: Primary | ICD-10-CM

## 2021-02-19 PROCEDURE — 99203 OFFICE O/P NEW LOW 30 MIN: CPT | Mod: 95,,, | Performed by: DERMATOLOGY

## 2021-02-19 PROCEDURE — 99203 PR OFFICE/OUTPT VISIT, NEW, LEVL III, 30-44 MIN: ICD-10-PCS | Mod: 95,,, | Performed by: DERMATOLOGY

## 2021-02-19 RX ORDER — DOXYCYCLINE 40 MG/1
40 CAPSULE ORAL DAILY
Qty: 30 CAPSULE | Refills: 5 | Status: SHIPPED | OUTPATIENT
Start: 2021-02-19 | End: 2021-03-01

## 2021-02-19 RX ORDER — DOXYCYCLINE 100 MG/1
CAPSULE ORAL
Qty: 14 CAPSULE | Refills: 0 | Status: SHIPPED | OUTPATIENT
Start: 2021-02-19 | End: 2021-03-01

## 2021-02-20 ENCOUNTER — PATIENT MESSAGE (OUTPATIENT)
Dept: DERMATOLOGY | Facility: CLINIC | Age: 46
End: 2021-02-20

## 2021-02-22 ENCOUNTER — PATIENT MESSAGE (OUTPATIENT)
Dept: RHEUMATOLOGY | Facility: CLINIC | Age: 46
End: 2021-02-22

## 2021-02-22 ENCOUNTER — TELEPHONE (OUTPATIENT)
Dept: DERMATOLOGY | Facility: CLINIC | Age: 46
End: 2021-02-22

## 2021-02-22 ENCOUNTER — TELEPHONE (OUTPATIENT)
Dept: INTERNAL MEDICINE | Facility: CLINIC | Age: 46
End: 2021-02-22

## 2021-02-22 DIAGNOSIS — Z01.84 IMMUNITY STATUS TESTING: ICD-10-CM

## 2021-02-22 DIAGNOSIS — E06.3 HASHIMOTO'S THYROIDITIS: Primary | ICD-10-CM

## 2021-02-23 ENCOUNTER — PATIENT MESSAGE (OUTPATIENT)
Dept: DERMATOLOGY | Facility: CLINIC | Age: 46
End: 2021-02-23

## 2021-02-23 DIAGNOSIS — L71.9 ROSACEA: Primary | ICD-10-CM

## 2021-02-23 RX ORDER — SODIUM SULFACETAMIDE 100 MG/ML
LIQUID TOPICAL
Qty: 340 ML | Refills: 6 | Status: SHIPPED | OUTPATIENT
Start: 2021-02-23 | End: 2021-02-26 | Stop reason: ALTCHOICE

## 2021-02-23 RX ORDER — METRONIDAZOLE 7.5 MG/G
GEL TOPICAL
Qty: 45 G | Refills: 3 | Status: SHIPPED | OUTPATIENT
Start: 2021-02-23

## 2021-02-26 ENCOUNTER — OFFICE VISIT (OUTPATIENT)
Dept: RHEUMATOLOGY | Facility: CLINIC | Age: 46
End: 2021-02-26
Payer: COMMERCIAL

## 2021-02-26 ENCOUNTER — LAB VISIT (OUTPATIENT)
Dept: LAB | Facility: HOSPITAL | Age: 46
End: 2021-02-26
Attending: PHYSICIAN ASSISTANT
Payer: COMMERCIAL

## 2021-02-26 VITALS
DIASTOLIC BLOOD PRESSURE: 100 MMHG | HEART RATE: 98 BPM | BODY MASS INDEX: 31.21 KG/M2 | WEIGHT: 205.94 LBS | SYSTOLIC BLOOD PRESSURE: 172 MMHG | HEIGHT: 68 IN

## 2021-02-26 DIAGNOSIS — M35.9 XEROSTOMIA DUE TO AUTOIMMUNE DISEASE: ICD-10-CM

## 2021-02-26 DIAGNOSIS — K11.7 XEROSTOMIA DUE TO AUTOIMMUNE DISEASE: ICD-10-CM

## 2021-02-26 DIAGNOSIS — M35.01 SJOGREN'S SYNDROME WITH KERATOCONJUNCTIVITIS SICCA: Primary | ICD-10-CM

## 2021-02-26 DIAGNOSIS — M35.01 SJOGREN'S SYNDROME WITH KERATOCONJUNCTIVITIS SICCA: ICD-10-CM

## 2021-02-26 DIAGNOSIS — Z01.84 IMMUNITY STATUS TESTING: ICD-10-CM

## 2021-02-26 DIAGNOSIS — E06.3 HASHIMOTO'S THYROIDITIS: ICD-10-CM

## 2021-02-26 DIAGNOSIS — R76.8 RHEUMATOID FACTOR POSITIVE: ICD-10-CM

## 2021-02-26 DIAGNOSIS — L50.9 HIVES: ICD-10-CM

## 2021-02-26 DIAGNOSIS — Z51.81 MEDICATION MONITORING ENCOUNTER: Chronic | ICD-10-CM

## 2021-02-26 DIAGNOSIS — Z79.899 ENCOUNTER FOR LONG-TERM CURRENT USE OF HIGH RISK MEDICATION: ICD-10-CM

## 2021-02-26 LAB
ALBUMIN SERPL BCP-MCNC: 4 G/DL (ref 3.5–5.2)
ALP SERPL-CCNC: 68 U/L (ref 55–135)
ALT SERPL W/O P-5'-P-CCNC: 17 U/L (ref 10–44)
ANION GAP SERPL CALC-SCNC: 9 MMOL/L (ref 8–16)
AST SERPL-CCNC: 17 U/L (ref 10–40)
BASOPHILS # BLD AUTO: 0.01 K/UL (ref 0–0.2)
BASOPHILS NFR BLD: 0.3 % (ref 0–1.9)
BILIRUB SERPL-MCNC: 0.3 MG/DL (ref 0.1–1)
BUN SERPL-MCNC: 11 MG/DL (ref 6–20)
CALCIUM SERPL-MCNC: 9.2 MG/DL (ref 8.7–10.5)
CHLORIDE SERPL-SCNC: 105 MMOL/L (ref 95–110)
CO2 SERPL-SCNC: 26 MMOL/L (ref 23–29)
CREAT SERPL-MCNC: 0.8 MG/DL (ref 0.5–1.4)
CRP SERPL-MCNC: 2.7 MG/L (ref 0–8.2)
DIFFERENTIAL METHOD: ABNORMAL
EOSINOPHIL # BLD AUTO: 0.1 K/UL (ref 0–0.5)
EOSINOPHIL NFR BLD: 1.8 % (ref 0–8)
ERYTHROCYTE [DISTWIDTH] IN BLOOD BY AUTOMATED COUNT: 12.4 % (ref 11.5–14.5)
ERYTHROCYTE [SEDIMENTATION RATE] IN BLOOD BY WESTERGREN METHOD: 18 MM/HR (ref 0–36)
EST. GFR  (AFRICAN AMERICAN): >60 ML/MIN/1.73 M^2
EST. GFR  (NON AFRICAN AMERICAN): >60 ML/MIN/1.73 M^2
GLUCOSE SERPL-MCNC: 96 MG/DL (ref 70–110)
HCT VFR BLD AUTO: 41 % (ref 37–48.5)
HGB BLD-MCNC: 13.8 G/DL (ref 12–16)
IMM GRANULOCYTES # BLD AUTO: 0.01 K/UL (ref 0–0.04)
IMM GRANULOCYTES NFR BLD AUTO: 0.3 % (ref 0–0.5)
LYMPHOCYTES # BLD AUTO: 1.1 K/UL (ref 1–4.8)
LYMPHOCYTES NFR BLD: 27.8 % (ref 18–48)
MCH RBC QN AUTO: 30.6 PG (ref 27–31)
MCHC RBC AUTO-ENTMCNC: 33.7 G/DL (ref 32–36)
MCV RBC AUTO: 91 FL (ref 82–98)
MONOCYTES # BLD AUTO: 0.4 K/UL (ref 0.3–1)
MONOCYTES NFR BLD: 9.3 % (ref 4–15)
NEUTROPHILS # BLD AUTO: 2.4 K/UL (ref 1.8–7.7)
NEUTROPHILS NFR BLD: 60.5 % (ref 38–73)
NRBC BLD-RTO: 0 /100 WBC
PLATELET # BLD AUTO: 293 K/UL (ref 150–350)
PMV BLD AUTO: 8.3 FL (ref 9.2–12.9)
POTASSIUM SERPL-SCNC: 3.8 MMOL/L (ref 3.5–5.1)
PROT SERPL-MCNC: 7.6 G/DL (ref 6–8.4)
RBC # BLD AUTO: 4.51 M/UL (ref 4–5.4)
SODIUM SERPL-SCNC: 140 MMOL/L (ref 136–145)
WBC # BLD AUTO: 3.96 K/UL (ref 3.9–12.7)

## 2021-02-26 PROCEDURE — 86235 NUCLEAR ANTIGEN ANTIBODY: CPT | Mod: 59

## 2021-02-26 PROCEDURE — 85025 COMPLETE CBC W/AUTO DIFF WBC: CPT

## 2021-02-26 PROCEDURE — 36415 COLL VENOUS BLD VENIPUNCTURE: CPT

## 2021-02-26 PROCEDURE — 85652 RBC SED RATE AUTOMATED: CPT

## 2021-02-26 PROCEDURE — 80053 COMPREHEN METABOLIC PANEL: CPT

## 2021-02-26 PROCEDURE — 99215 PR OFFICE/OUTPT VISIT, EST, LEVL V, 40-54 MIN: ICD-10-PCS | Mod: S$GLB,,, | Performed by: PHYSICIAN ASSISTANT

## 2021-02-26 PROCEDURE — 99215 OFFICE O/P EST HI 40 MIN: CPT | Mod: S$GLB,,, | Performed by: PHYSICIAN ASSISTANT

## 2021-02-26 PROCEDURE — 86162 COMPLEMENT TOTAL (CH50): CPT

## 2021-02-26 PROCEDURE — 99999 PR PBB SHADOW E&M-EST. PATIENT-LVL III: CPT | Mod: PBBFAC,,, | Performed by: PHYSICIAN ASSISTANT

## 2021-02-26 PROCEDURE — 86160 COMPLEMENT ANTIGEN: CPT

## 2021-02-26 PROCEDURE — 86140 C-REACTIVE PROTEIN: CPT

## 2021-02-26 PROCEDURE — 86160 COMPLEMENT ANTIGEN: CPT | Mod: 59

## 2021-02-26 PROCEDURE — 86769 SARS-COV-2 COVID-19 ANTIBODY: CPT

## 2021-02-26 PROCEDURE — 86039 ANTINUCLEAR ANTIBODIES (ANA): CPT

## 2021-02-26 PROCEDURE — 84439 ASSAY OF FREE THYROXINE: CPT

## 2021-02-26 PROCEDURE — 86038 ANTINUCLEAR ANTIBODIES: CPT

## 2021-02-26 PROCEDURE — 84480 ASSAY TRIIODOTHYRONINE (T3): CPT

## 2021-02-26 PROCEDURE — 99999 PR PBB SHADOW E&M-EST. PATIENT-LVL III: ICD-10-PCS | Mod: PBBFAC,,, | Performed by: PHYSICIAN ASSISTANT

## 2021-02-26 PROCEDURE — 84443 ASSAY THYROID STIM HORMONE: CPT

## 2021-02-26 RX ORDER — AMOXICILLIN 500 MG
CAPSULE ORAL DAILY
COMMUNITY
End: 2022-10-24

## 2021-02-27 ENCOUNTER — PATIENT MESSAGE (OUTPATIENT)
Dept: RHEUMATOLOGY | Facility: CLINIC | Age: 46
End: 2021-02-27

## 2021-02-27 LAB
C3 SERPL-MCNC: 135 MG/DL (ref 50–180)
C4 SERPL-MCNC: 20 MG/DL (ref 11–44)
SARS-COV-2 IGG SERPLBLD QL IA.RAPID: NEGATIVE
T3 SERPL-MCNC: 112 NG/DL (ref 60–180)
T4 FREE SERPL-MCNC: 0.94 NG/DL (ref 0.71–1.51)
TSH SERPL DL<=0.005 MIU/L-ACNC: 2 UIU/ML (ref 0.4–4)

## 2021-03-01 LAB
ANA PATTERN 1: NORMAL
ANA SER QL IF: POSITIVE
ANA TITR SER IF: NORMAL {TITER}
CH50 SERPL-ACNC: 77 U/ML (ref 42–95)

## 2021-03-03 LAB
ANTI SM ANTIBODY: 0.13 RATIO (ref 0–0.99)
ANTI SM/RNP ANTIBODY: 0.16 RATIO (ref 0–0.99)
ANTI-SM INTERPRETATION: NEGATIVE
ANTI-SM/RNP INTERPRETATION: NEGATIVE
ANTI-SSA ANTIBODY: 5.32 RATIO (ref 0–0.99)
ANTI-SSA INTERPRETATION: POSITIVE
ANTI-SSB ANTIBODY: 0.53 RATIO (ref 0–0.99)
ANTI-SSB INTERPRETATION: NEGATIVE
DSDNA AB SER-ACNC: ABNORMAL [IU]/ML

## 2021-03-06 ENCOUNTER — OFFICE VISIT (OUTPATIENT)
Dept: INTERNAL MEDICINE | Facility: CLINIC | Age: 46
End: 2021-03-06
Payer: COMMERCIAL

## 2021-03-06 VITALS
OXYGEN SATURATION: 99 % | DIASTOLIC BLOOD PRESSURE: 74 MMHG | BODY MASS INDEX: 31.83 KG/M2 | TEMPERATURE: 98 F | HEART RATE: 96 BPM | WEIGHT: 202.81 LBS | HEIGHT: 67 IN | SYSTOLIC BLOOD PRESSURE: 126 MMHG

## 2021-03-06 DIAGNOSIS — Z86.32 HISTORY OF GESTATIONAL DIABETES: ICD-10-CM

## 2021-03-06 DIAGNOSIS — G47.33 OSA (OBSTRUCTIVE SLEEP APNEA): ICD-10-CM

## 2021-03-06 DIAGNOSIS — Z12.31 ENCOUNTER FOR SCREENING MAMMOGRAM FOR MALIGNANT NEOPLASM OF BREAST: ICD-10-CM

## 2021-03-06 DIAGNOSIS — M35.9 XEROSTOMIA DUE TO AUTOIMMUNE DISEASE: ICD-10-CM

## 2021-03-06 DIAGNOSIS — K11.7 XEROSTOMIA DUE TO AUTOIMMUNE DISEASE: ICD-10-CM

## 2021-03-06 DIAGNOSIS — R53.82 CHRONIC FATIGUE: ICD-10-CM

## 2021-03-06 DIAGNOSIS — M35.01 SJOGREN'S SYNDROME WITH KERATOCONJUNCTIVITIS SICCA: Primary | ICD-10-CM

## 2021-03-06 PROCEDURE — 99214 PR OFFICE/OUTPT VISIT, EST, LEVL IV, 30-39 MIN: ICD-10-PCS | Mod: S$GLB,,, | Performed by: FAMILY MEDICINE

## 2021-03-06 PROCEDURE — 99214 OFFICE O/P EST MOD 30 MIN: CPT | Mod: S$GLB,,, | Performed by: FAMILY MEDICINE

## 2021-03-06 PROCEDURE — 99999 PR PBB SHADOW E&M-EST. PATIENT-LVL IV: ICD-10-PCS | Mod: PBBFAC,,, | Performed by: FAMILY MEDICINE

## 2021-03-06 PROCEDURE — 99999 PR PBB SHADOW E&M-EST. PATIENT-LVL IV: CPT | Mod: PBBFAC,,, | Performed by: FAMILY MEDICINE

## 2021-03-08 ENCOUNTER — TELEPHONE (OUTPATIENT)
Dept: PULMONOLOGY | Facility: CLINIC | Age: 46
End: 2021-03-08

## 2021-03-10 ENCOUNTER — TELEPHONE (OUTPATIENT)
Dept: RHEUMATOLOGY | Facility: CLINIC | Age: 46
End: 2021-03-10

## 2021-03-11 DIAGNOSIS — M35.00 SJOGREN'S DISEASE: ICD-10-CM

## 2021-03-11 RX ORDER — HYDROXYCHLOROQUINE SULFATE 200 MG/1
200 TABLET, FILM COATED ORAL 2 TIMES DAILY
Qty: 180 TABLET | Refills: 6 | Status: SHIPPED | OUTPATIENT
Start: 2021-03-11 | End: 2022-04-08 | Stop reason: SDUPTHER

## 2021-03-12 ENCOUNTER — PATIENT MESSAGE (OUTPATIENT)
Dept: PULMONOLOGY | Facility: CLINIC | Age: 46
End: 2021-03-12

## 2021-03-15 ENCOUNTER — PATIENT MESSAGE (OUTPATIENT)
Dept: RHEUMATOLOGY | Facility: CLINIC | Age: 46
End: 2021-03-15

## 2021-03-19 ENCOUNTER — OFFICE VISIT (OUTPATIENT)
Dept: RHEUMATOLOGY | Facility: CLINIC | Age: 46
End: 2021-03-19
Payer: COMMERCIAL

## 2021-03-19 ENCOUNTER — LAB VISIT (OUTPATIENT)
Dept: LAB | Facility: HOSPITAL | Age: 46
End: 2021-03-19
Attending: PHYSICIAN ASSISTANT
Payer: COMMERCIAL

## 2021-03-19 VITALS
HEIGHT: 67 IN | HEART RATE: 115 BPM | SYSTOLIC BLOOD PRESSURE: 130 MMHG | WEIGHT: 203.25 LBS | BODY MASS INDEX: 31.9 KG/M2 | DIASTOLIC BLOOD PRESSURE: 63 MMHG

## 2021-03-19 DIAGNOSIS — Z79.899 ENCOUNTER FOR LONG-TERM CURRENT USE OF HIGH RISK MEDICATION: ICD-10-CM

## 2021-03-19 DIAGNOSIS — K11.7 XEROSTOMIA DUE TO AUTOIMMUNE DISEASE: ICD-10-CM

## 2021-03-19 DIAGNOSIS — M35.01 SJOGREN'S SYNDROME WITH KERATOCONJUNCTIVITIS SICCA: Primary | ICD-10-CM

## 2021-03-19 DIAGNOSIS — Z01.84 IMMUNITY STATUS TESTING: ICD-10-CM

## 2021-03-19 DIAGNOSIS — L50.9 HIVES: ICD-10-CM

## 2021-03-19 DIAGNOSIS — R91.8 MULTIPLE PULMONARY NODULES: ICD-10-CM

## 2021-03-19 DIAGNOSIS — M35.9 XEROSTOMIA DUE TO AUTOIMMUNE DISEASE: ICD-10-CM

## 2021-03-19 DIAGNOSIS — R76.8 RHEUMATOID FACTOR POSITIVE: ICD-10-CM

## 2021-03-19 PROCEDURE — 99214 OFFICE O/P EST MOD 30 MIN: CPT | Mod: S$GLB,,, | Performed by: PHYSICIAN ASSISTANT

## 2021-03-19 PROCEDURE — 86769 SARS-COV-2 COVID-19 ANTIBODY: CPT | Performed by: PHYSICIAN ASSISTANT

## 2021-03-19 PROCEDURE — 99999 PR PBB SHADOW E&M-EST. PATIENT-LVL III: ICD-10-PCS | Mod: PBBFAC,,, | Performed by: PHYSICIAN ASSISTANT

## 2021-03-19 PROCEDURE — 36415 COLL VENOUS BLD VENIPUNCTURE: CPT | Mod: PO | Performed by: PHYSICIAN ASSISTANT

## 2021-03-19 PROCEDURE — 99999 PR PBB SHADOW E&M-EST. PATIENT-LVL III: CPT | Mod: PBBFAC,,, | Performed by: PHYSICIAN ASSISTANT

## 2021-03-19 PROCEDURE — 99214 PR OFFICE/OUTPT VISIT, EST, LEVL IV, 30-39 MIN: ICD-10-PCS | Mod: S$GLB,,, | Performed by: PHYSICIAN ASSISTANT

## 2021-03-19 RX ORDER — MONTELUKAST SODIUM 10 MG/1
10 TABLET ORAL NIGHTLY
Qty: 30 TABLET | Refills: 6 | Status: SHIPPED | OUTPATIENT
Start: 2021-03-19 | End: 2021-04-18

## 2021-03-20 LAB — SARS-COV-2 IGG SERPLBLD QL IA.RAPID: NEGATIVE

## 2021-03-23 ENCOUNTER — PATIENT MESSAGE (OUTPATIENT)
Dept: RHEUMATOLOGY | Facility: CLINIC | Age: 46
End: 2021-03-23

## 2021-07-27 ENCOUNTER — PATIENT MESSAGE (OUTPATIENT)
Dept: RHEUMATOLOGY | Facility: CLINIC | Age: 46
End: 2021-07-27

## 2021-08-06 ENCOUNTER — PATIENT MESSAGE (OUTPATIENT)
Dept: PULMONOLOGY | Facility: CLINIC | Age: 46
End: 2021-08-06

## 2021-08-11 ENCOUNTER — OFFICE VISIT (OUTPATIENT)
Dept: SLEEP MEDICINE | Facility: CLINIC | Age: 46
End: 2021-08-11
Payer: COMMERCIAL

## 2021-08-11 VITALS — WEIGHT: 192 LBS | BODY MASS INDEX: 30.13 KG/M2 | HEIGHT: 67 IN

## 2021-08-11 DIAGNOSIS — E66.9 OBESITY, CLASS I, BMI 30-34.9: Primary | ICD-10-CM

## 2021-08-11 DIAGNOSIS — G47.33 OSA (OBSTRUCTIVE SLEEP APNEA): ICD-10-CM

## 2021-08-11 DIAGNOSIS — G47.10 HYPERSOMNIA: ICD-10-CM

## 2021-08-11 DIAGNOSIS — R06.83 SNORING: ICD-10-CM

## 2021-08-11 PROCEDURE — 99243 PR OFFICE CONSULTATION,LEVEL III: ICD-10-PCS | Mod: 95,,, | Performed by: NURSE PRACTITIONER

## 2021-08-11 PROCEDURE — 99243 OFF/OP CNSLTJ NEW/EST LOW 30: CPT | Mod: 95,,, | Performed by: NURSE PRACTITIONER

## 2021-08-23 ENCOUNTER — TELEPHONE (OUTPATIENT)
Dept: PULMONOLOGY | Facility: CLINIC | Age: 46
End: 2021-08-23

## 2021-10-08 PROCEDURE — 95806 PR SLEEP STUDY, UNATTENDED, SIMUL RECORD HR/O2 SAT/RESP FLOW/RESP EFFT: ICD-10-PCS | Mod: 26,S$GLB,, | Performed by: INTERNAL MEDICINE

## 2021-10-08 PROCEDURE — 95806 SLEEP STUDY UNATT&RESP EFFT: CPT | Mod: 26,S$GLB,, | Performed by: INTERNAL MEDICINE

## 2021-10-11 ENCOUNTER — PROCEDURE VISIT (OUTPATIENT)
Dept: SLEEP MEDICINE | Facility: CLINIC | Age: 46
End: 2021-10-11
Payer: COMMERCIAL

## 2021-10-11 DIAGNOSIS — G47.33 OSA (OBSTRUCTIVE SLEEP APNEA): Primary | ICD-10-CM

## 2021-10-19 ENCOUNTER — OFFICE VISIT (OUTPATIENT)
Dept: SLEEP MEDICINE | Facility: CLINIC | Age: 46
End: 2021-10-19
Payer: COMMERCIAL

## 2021-10-19 VITALS — HEIGHT: 67 IN | WEIGHT: 194 LBS | BODY MASS INDEX: 30.45 KG/M2

## 2021-10-19 DIAGNOSIS — G47.33 OSA (OBSTRUCTIVE SLEEP APNEA): Primary | ICD-10-CM

## 2021-10-19 DIAGNOSIS — G47.10 HYPERSOMNIA: ICD-10-CM

## 2021-10-19 DIAGNOSIS — E66.9 OBESITY, CLASS I, BMI 30-34.9: ICD-10-CM

## 2021-10-19 DIAGNOSIS — R06.83 SNORING: ICD-10-CM

## 2021-10-19 PROCEDURE — 99213 OFFICE O/P EST LOW 20 MIN: CPT | Mod: 95,,, | Performed by: NURSE PRACTITIONER

## 2021-10-19 PROCEDURE — 99213 PR OFFICE/OUTPT VISIT, EST, LEVL III, 20-29 MIN: ICD-10-PCS | Mod: 95,,, | Performed by: NURSE PRACTITIONER

## 2022-02-23 DIAGNOSIS — D84.9 IMMUNOSUPPRESSED STATUS: ICD-10-CM

## 2022-03-29 ENCOUNTER — OFFICE VISIT (OUTPATIENT)
Dept: SLEEP MEDICINE | Facility: CLINIC | Age: 47
End: 2022-03-29
Payer: COMMERCIAL

## 2022-03-29 VITALS
RESPIRATION RATE: 16 BRPM | DIASTOLIC BLOOD PRESSURE: 82 MMHG | WEIGHT: 203.69 LBS | HEART RATE: 64 BPM | SYSTOLIC BLOOD PRESSURE: 138 MMHG | OXYGEN SATURATION: 99 % | BODY MASS INDEX: 31.9 KG/M2

## 2022-03-29 DIAGNOSIS — R06.83 SNORING: ICD-10-CM

## 2022-03-29 DIAGNOSIS — G47.10 HYPERSOMNIA: ICD-10-CM

## 2022-03-29 DIAGNOSIS — G47.33 OSA (OBSTRUCTIVE SLEEP APNEA): Primary | ICD-10-CM

## 2022-03-29 DIAGNOSIS — E66.9 OBESITY, CLASS I, BMI 30-34.9: ICD-10-CM

## 2022-03-29 PROCEDURE — 99999 PR PBB SHADOW E&M-EST. PATIENT-LVL IV: CPT | Mod: PBBFAC,,, | Performed by: NURSE PRACTITIONER

## 2022-03-29 PROCEDURE — 99214 PR OFFICE/OUTPT VISIT, EST, LEVL IV, 30-39 MIN: ICD-10-PCS | Mod: S$GLB,,, | Performed by: NURSE PRACTITIONER

## 2022-03-29 PROCEDURE — 99214 OFFICE O/P EST MOD 30 MIN: CPT | Mod: S$GLB,,, | Performed by: NURSE PRACTITIONER

## 2022-03-29 PROCEDURE — 99999 PR PBB SHADOW E&M-EST. PATIENT-LVL IV: ICD-10-PCS | Mod: PBBFAC,,, | Performed by: NURSE PRACTITIONER

## 2022-03-29 NOTE — PROGRESS NOTES
Subjective:      Patient ID: Olesya Tay is a 47 y.o. female.    Chief Complaint: Sleep Apnea    HPI  Presents for sleep apnea. She was starting to habituate. Then she had sinus congestion and stopped wearing at the end of december. She did benefit from use. She is going to start back on PAP.       Patient Active Problem List   Diagnosis    Sjogren's syndrome with keratoconjunctivitis sicca    Rheumatoid factor positive    Multiple pulmonary nodules    Rosacea    Encounter for long-term current use of high risk medication    Xerostomia due to autoimmune disease    Chronic fatigue    History of gestational diabetes    Hair loss     .   /82 (BP Location: Right arm)   Pulse 64   Resp 16   Wt 92.4 kg (203 lb 11.3 oz)   SpO2 99%   BMI 31.90 kg/m²   Body mass index is 31.9 kg/m².    Review of Systems   Constitutional: Positive for fatigue.   HENT: Negative.    Respiratory: Positive for snoring and somnolence.    Cardiovascular: Negative.    Musculoskeletal: Negative.    Gastrointestinal: Negative.    Neurological: Negative.    Psychiatric/Behavioral: Positive for sleep disturbance.     Objective:      Physical Exam  Constitutional:       Appearance: Normal appearance. She is well-developed.   HENT:      Head: Normocephalic and atraumatic.      Mouth/Throat:      Comments: Wearing mask due to covid concerns  Cardiovascular:      Rate and Rhythm: Normal rate and regular rhythm.      Heart sounds: No murmur heard.    No gallop.   Pulmonary:      Effort: Pulmonary effort is normal.      Breath sounds: Normal breath sounds.   Abdominal:      Palpations: Abdomen is soft. There is no mass.      Tenderness: There is no abdominal tenderness.   Musculoskeletal:         General: Normal range of motion.      Cervical back: Normal range of motion and neck supple.   Skin:     General: Skin is warm and dry.   Neurological:      Mental Status: She is alert and oriented to person, place, and time.   Psychiatric:          Mood and Affect: Mood normal.         Behavior: Behavior normal.       Personal Diagnostic Review  Usage 12/02/2021 - 12/31/2021  Usage days 19/30 days (63%)  >= 4 hours 11 days (37%)  < 4 hours 8 days (27%)  Usage hours 101 hours 54 minutes  Average usage (total days) 3 hours 24 minutes  Average usage (days used) 5 hours 22 minutes  Median usage (days used) 5 hours 11 minutes  Total used hours (value since last reset - 12/31/2021) 102 hours  AirSense 11 AutoSet  Serial number [id number]  Mode AutoSet  Min Pressure 5 cmH2O  Max Pressure 15 cmH2O  EPR Fulltime  EPR level 3  Response Standard  Therapy  Pressure - cmH2O Median: 6.4 95th percentile: 8.4 Maximum: 9.3  Leaks - L/min Median: 0.8 95th percentile: 5.2 Maximum: 14.8  Events per hour AI: 1.8 HI: 0.2 AHI: 2.0  Apnea Index Central: 0.4 Obstructive: 1.3 Unknown: 0.0  RERA Index 0.1  Cheyne-Max respiration (average duration per night) 0 minutes (0%)      Assessment:       1. SIENA (obstructive sleep apnea)    2. Obesity, Class I, BMI 30-34.9    3. Hypersomnia    4. Snoring        Outpatient Encounter Medications as of 3/29/2022   Medication Sig Dispense Refill    hydrOXYchloroQUINE (PLAQUENIL) 200 mg tablet Take 1 tablet (200 mg total) by mouth 2 (two) times daily. 180 tablet 6    omega-3 fatty acids/fish oil (FISH OIL-OMEGA-3 FATTY ACIDS) 300-1,000 mg capsule Take by mouth once daily. occasionally      vitamin E 400 UNIT capsule Take 400 Units by mouth 2 (two) times daily.      metroNIDAZOLE (METROGEL) 0.75 % gel AAA bid. For redness (Patient not taking: Reported on 3/29/2022) 45 g 3     No facility-administered encounter medications on file as of 3/29/2022.     No orders of the defined types were placed in this encounter.    Plan:      Wear APAP nightly over 4 hours.   Weight loss and exercise to improve overall health.    Problem List Items Addressed This Visit    None     Visit Diagnoses     SIENA (obstructive sleep apnea)    -  Primary    Obesity,  Class I, BMI 30-34.9        Hypersomnia        Snoring

## 2022-04-01 ENCOUNTER — PATIENT MESSAGE (OUTPATIENT)
Dept: RHEUMATOLOGY | Facility: CLINIC | Age: 47
End: 2022-04-01
Payer: COMMERCIAL

## 2022-04-01 DIAGNOSIS — Z51.81 MEDICATION MONITORING ENCOUNTER: ICD-10-CM

## 2022-04-01 DIAGNOSIS — M35.01 SJOGREN'S SYNDROME WITH KERATOCONJUNCTIVITIS SICCA: Primary | ICD-10-CM

## 2022-04-01 DIAGNOSIS — Z79.899 ENCOUNTER FOR LONG-TERM CURRENT USE OF HIGH RISK MEDICATION: ICD-10-CM

## 2022-04-01 NOTE — TELEPHONE ENCOUNTER
I just ordered the labs I want-- will you please contact her to schedule before her appt if possible

## 2022-04-08 DIAGNOSIS — M35.00 SJOGREN'S DISEASE: ICD-10-CM

## 2022-04-11 RX ORDER — HYDROXYCHLOROQUINE SULFATE 200 MG/1
200 TABLET, FILM COATED ORAL 2 TIMES DAILY
Qty: 180 TABLET | Refills: 3 | Status: SHIPPED | OUTPATIENT
Start: 2022-04-11 | End: 2023-04-25 | Stop reason: SDUPTHER

## 2022-04-19 ENCOUNTER — LAB VISIT (OUTPATIENT)
Dept: LAB | Facility: HOSPITAL | Age: 47
End: 2022-04-19
Attending: PHYSICIAN ASSISTANT
Payer: COMMERCIAL

## 2022-04-19 DIAGNOSIS — Z51.81 MEDICATION MONITORING ENCOUNTER: ICD-10-CM

## 2022-04-19 DIAGNOSIS — Z79.899 ENCOUNTER FOR LONG-TERM CURRENT USE OF HIGH RISK MEDICATION: ICD-10-CM

## 2022-04-19 DIAGNOSIS — M35.01 SJOGREN'S SYNDROME WITH KERATOCONJUNCTIVITIS SICCA: ICD-10-CM

## 2022-04-19 LAB
ALBUMIN SERPL BCP-MCNC: 3.7 G/DL (ref 3.5–5.2)
ALP SERPL-CCNC: 76 U/L (ref 55–135)
ALT SERPL W/O P-5'-P-CCNC: 19 U/L (ref 10–44)
ANION GAP SERPL CALC-SCNC: 7 MMOL/L (ref 8–16)
AST SERPL-CCNC: 18 U/L (ref 10–40)
BASOPHILS # BLD AUTO: 0.02 K/UL (ref 0–0.2)
BASOPHILS NFR BLD: 0.5 % (ref 0–1.9)
BILIRUB SERPL-MCNC: 0.4 MG/DL (ref 0.1–1)
BUN SERPL-MCNC: 10 MG/DL (ref 6–20)
C3 SERPL-MCNC: 121 MG/DL (ref 50–180)
C4 SERPL-MCNC: 20 MG/DL (ref 11–44)
CALCIUM SERPL-MCNC: 9.2 MG/DL (ref 8.7–10.5)
CHLORIDE SERPL-SCNC: 105 MMOL/L (ref 95–110)
CO2 SERPL-SCNC: 27 MMOL/L (ref 23–29)
CREAT SERPL-MCNC: 0.8 MG/DL (ref 0.5–1.4)
CRP SERPL-MCNC: 4.2 MG/L (ref 0–8.2)
DIFFERENTIAL METHOD: ABNORMAL
EOSINOPHIL # BLD AUTO: 0.1 K/UL (ref 0–0.5)
EOSINOPHIL NFR BLD: 1.8 % (ref 0–8)
ERYTHROCYTE [DISTWIDTH] IN BLOOD BY AUTOMATED COUNT: 12.5 % (ref 11.5–14.5)
EST. GFR  (AFRICAN AMERICAN): >60 ML/MIN/1.73 M^2
EST. GFR  (NON AFRICAN AMERICAN): >60 ML/MIN/1.73 M^2
GLUCOSE SERPL-MCNC: 98 MG/DL (ref 70–110)
HCT VFR BLD AUTO: 38.7 % (ref 37–48.5)
HGB BLD-MCNC: 13 G/DL (ref 12–16)
IMM GRANULOCYTES # BLD AUTO: 0.01 K/UL (ref 0–0.04)
IMM GRANULOCYTES NFR BLD AUTO: 0.2 % (ref 0–0.5)
LYMPHOCYTES # BLD AUTO: 1 K/UL (ref 1–4.8)
LYMPHOCYTES NFR BLD: 23 % (ref 18–48)
MCH RBC QN AUTO: 30.4 PG (ref 27–31)
MCHC RBC AUTO-ENTMCNC: 33.6 G/DL (ref 32–36)
MCV RBC AUTO: 91 FL (ref 82–98)
MONOCYTES # BLD AUTO: 0.4 K/UL (ref 0.3–1)
MONOCYTES NFR BLD: 7.9 % (ref 4–15)
NEUTROPHILS # BLD AUTO: 3 K/UL (ref 1.8–7.7)
NEUTROPHILS NFR BLD: 66.6 % (ref 38–73)
NRBC BLD-RTO: 0 /100 WBC
PLATELET # BLD AUTO: 296 K/UL (ref 150–450)
PMV BLD AUTO: 8.4 FL (ref 9.2–12.9)
POTASSIUM SERPL-SCNC: 3.7 MMOL/L (ref 3.5–5.1)
PROT SERPL-MCNC: 7.3 G/DL (ref 6–8.4)
RBC # BLD AUTO: 4.27 M/UL (ref 4–5.4)
SODIUM SERPL-SCNC: 139 MMOL/L (ref 136–145)
WBC # BLD AUTO: 4.43 K/UL (ref 3.9–12.7)

## 2022-04-19 PROCEDURE — 36415 COLL VENOUS BLD VENIPUNCTURE: CPT | Performed by: PHYSICIAN ASSISTANT

## 2022-04-19 PROCEDURE — 85652 RBC SED RATE AUTOMATED: CPT | Performed by: PHYSICIAN ASSISTANT

## 2022-04-19 PROCEDURE — 86039 ANTINUCLEAR ANTIBODIES (ANA): CPT | Performed by: PHYSICIAN ASSISTANT

## 2022-04-19 PROCEDURE — 80053 COMPREHEN METABOLIC PANEL: CPT | Performed by: PHYSICIAN ASSISTANT

## 2022-04-19 PROCEDURE — 86235 NUCLEAR ANTIGEN ANTIBODY: CPT | Mod: 59 | Performed by: PHYSICIAN ASSISTANT

## 2022-04-19 PROCEDURE — 86160 COMPLEMENT ANTIGEN: CPT | Performed by: PHYSICIAN ASSISTANT

## 2022-04-19 PROCEDURE — 86038 ANTINUCLEAR ANTIBODIES: CPT | Performed by: PHYSICIAN ASSISTANT

## 2022-04-19 PROCEDURE — 86160 COMPLEMENT ANTIGEN: CPT | Mod: 59 | Performed by: PHYSICIAN ASSISTANT

## 2022-04-19 PROCEDURE — 85025 COMPLETE CBC W/AUTO DIFF WBC: CPT | Performed by: PHYSICIAN ASSISTANT

## 2022-04-19 PROCEDURE — 86140 C-REACTIVE PROTEIN: CPT | Performed by: PHYSICIAN ASSISTANT

## 2022-04-20 LAB
ANA PATTERN 1: NORMAL
ANA SER QL IF: POSITIVE
ANA TITR SER IF: NORMAL {TITER}
ERYTHROCYTE [SEDIMENTATION RATE] IN BLOOD BY WESTERGREN METHOD: 33 MM/HR (ref 0–36)

## 2022-04-21 LAB
ANTI SM ANTIBODY: 0.2 RATIO (ref 0–0.99)
ANTI SM/RNP ANTIBODY: 0.21 RATIO (ref 0–0.99)
ANTI-SM INTERPRETATION: NEGATIVE
ANTI-SM/RNP INTERPRETATION: NEGATIVE
ANTI-SSA ANTIBODY: 4.58 RATIO (ref 0–0.99)
ANTI-SSA INTERPRETATION: POSITIVE
ANTI-SSB ANTIBODY: 0.57 RATIO (ref 0–0.99)
ANTI-SSB INTERPRETATION: NEGATIVE
DSDNA AB SER-ACNC: ABNORMAL [IU]/ML

## 2022-04-25 ENCOUNTER — TELEPHONE (OUTPATIENT)
Dept: RHEUMATOLOGY | Facility: CLINIC | Age: 47
End: 2022-04-25
Payer: COMMERCIAL

## 2022-04-26 ENCOUNTER — OFFICE VISIT (OUTPATIENT)
Dept: RHEUMATOLOGY | Facility: CLINIC | Age: 47
End: 2022-04-26
Payer: COMMERCIAL

## 2022-04-26 VITALS
HEART RATE: 68 BPM | WEIGHT: 207 LBS | HEIGHT: 67 IN | DIASTOLIC BLOOD PRESSURE: 81 MMHG | SYSTOLIC BLOOD PRESSURE: 124 MMHG | BODY MASS INDEX: 32.49 KG/M2

## 2022-04-26 DIAGNOSIS — Z51.81 MEDICATION MONITORING ENCOUNTER: ICD-10-CM

## 2022-04-26 DIAGNOSIS — Z79.899 ENCOUNTER FOR LONG-TERM CURRENT USE OF HIGH RISK MEDICATION: ICD-10-CM

## 2022-04-26 DIAGNOSIS — F32.A DEPRESSION, UNSPECIFIED DEPRESSION TYPE: ICD-10-CM

## 2022-04-26 DIAGNOSIS — M35.01 SJOGREN'S SYNDROME WITH KERATOCONJUNCTIVITIS SICCA: Primary | ICD-10-CM

## 2022-04-26 PROCEDURE — 99215 PR OFFICE/OUTPT VISIT, EST, LEVL V, 40-54 MIN: ICD-10-PCS | Mod: S$GLB,,, | Performed by: PHYSICIAN ASSISTANT

## 2022-04-26 PROCEDURE — 99999 PR PBB SHADOW E&M-EST. PATIENT-LVL III: ICD-10-PCS | Mod: PBBFAC,,, | Performed by: PHYSICIAN ASSISTANT

## 2022-04-26 PROCEDURE — 99999 PR PBB SHADOW E&M-EST. PATIENT-LVL III: CPT | Mod: PBBFAC,,, | Performed by: PHYSICIAN ASSISTANT

## 2022-04-26 PROCEDURE — 99215 OFFICE O/P EST HI 40 MIN: CPT | Mod: S$GLB,,, | Performed by: PHYSICIAN ASSISTANT

## 2022-04-26 NOTE — PROGRESS NOTES
RHEUMATOLOGY OUTPATIENT CLINIC NOTE    4/26/2022    Attending Rheumatologist: Annie Mcintyre PA-C  Primary Care Provider: Beena Kwon MD   Chief Complaint/Reason For Consultation:  Sjogrens's .      Subjective:        Olesya Tay is a 47 y.o. female here today for follow up chronic Sjogren syndrome with a positive SEAMUS, positive SSA, positive SSB, positive rheumatoid factor and dryness.   mildly positive CCP antibody but no evidence of rheumatoid arthritis.  (last ccp neg). She did have arthralgias associated with Sjogren's but no synovitis or joint swelling.  Usually mild  morning stiffness~ 10-20 min; chronic fatigue. Pt concerned about possible lupus overlap as she does c/o sun sensitivity and a butterfly rash. She does feel like she has some mild depression; only wants to use natural remedies at this time. She is working on increasing her physical activity. She does have a good support system at home. Physical exam does not show malar rash, no active synovitis.     Only taking HCQ one tablet about 2-3 times per week. She has not seen an increase in any joint pain or fatigue with this dosing. Dryness about the same- left sided gland swelling. She has not yet followed up with pulmonary for lung nodules/possible sjogren lung involvement. She is reluctant to have another CT done as she does not feel like she is having any lung issues at this time.    SEAMUS stable + rnp; no new antibody +      No fevers or unintentional weight loss. No night sweats.     No sun sensitive rashes.  No chest pain   No syncope or near syncope  No lower ext edema  No muscle weakness  No raynaud's    Serologies    Lab Results   Component Value Date    RF 22.0 (H) 06/19/2019       No components found for: CYCLICCITRULPEPTIDEANTIBODY     Lab Results   Component Value Date    HEPAIGM Negative 09/09/2019    HEPBIGM Negative 09/09/2019    HEPCAB Negative 09/09/2019       Current Rheum Medications:  · HCQ  Previous Rheum  "Medications:   · salagen    Past Medical History:   Diagnosis Date    Constipation     Depression     not taking meds    Endometriosis of pelvis     GERD (gastroesophageal reflux disease)     Gestational diabetes mellitus in pregnancy     Lung nodule     Sjogren's syndrome      Social History     Socioeconomic History    Marital status:     Number of children: 3   Occupational History    Occupation: part time library, selfemployed -rental  property   Tobacco Use    Smoking status: Never Smoker    Smokeless tobacco: Never Used   Substance and Sexual Activity    Alcohol use: Yes     Comment: socially    Drug use: No    Sexual activity: Yes     Partners: Male     Comment:  had vasectomy     Review of patient's allergies indicates:   Allergen Reactions    Doxycycline Shortness Of Breath     Elevated BP and shortness of breath    Prednisone Hives     Hives after taking medrol dose pack    Shrimp     Pcn [penicillins] Rash       Objective:   /81   Pulse 68   Ht 5' 7" (1.702 m)   Wt 93.9 kg (207 lb 0.2 oz)   BMI 32.42 kg/m²     Immunization History   Administered Date(s) Administered    Influenza 10/01/2018    Influenza - Quadrivalent - PF *Preferred* (6 months and older) 03/27/2017, 12/07/2017    Pneumococcal Conjugate - 13 Valent 12/07/2017    Tdap 05/25/2017       Current Outpatient Medications:     hydrOXYchloroQUINE (PLAQUENIL) 200 mg tablet, Take 1 tablet (200 mg total) by mouth 2 (two) times daily., Disp: 180 tablet, Rfl: 3    metroNIDAZOLE (METROGEL) 0.75 % gel, AAA bid. For redness, Disp: 45 g, Rfl: 3    omega-3 fatty acids/fish oil (FISH OIL-OMEGA-3 FATTY ACIDS) 300-1,000 mg capsule, Take by mouth once daily. occasionally, Disp: , Rfl:     vitamin E 400 UNIT capsule, Take 400 Units by mouth 2 (two) times daily., Disp: , Rfl:     Physical Exam   Constitutional: She is oriented to person, place, and time. No distress.   HENT:   Head: Normocephalic and atraumatic. "   Pulmonary/Chest: Effort normal.   Abdominal: Normal appearance.   Musculoskeletal:         General: No swelling or tenderness. Normal range of motion.      Cervical back: Normal range of motion.   Neurological: She is alert and oriented to person, place, and time.   Skin: Skin is warm and dry.   Psychiatric: Her behavior is normal. Mood normal.   Nursing note and vitals reviewed.       Recent Results (from the past 336 hour(s))   CBC Auto Differential    Collection Time: 04/19/22 12:52 PM   Result Value Ref Range    WBC 4.43 3.90 - 12.70 K/uL    RBC 4.27 4.00 - 5.40 M/uL    Hemoglobin 13.0 12.0 - 16.0 g/dL    Hematocrit 38.7 37.0 - 48.5 %    MCV 91 82 - 98 fL    MCH 30.4 27.0 - 31.0 pg    MCHC 33.6 32.0 - 36.0 g/dL    RDW 12.5 11.5 - 14.5 %    Platelets 296 150 - 450 K/uL    MPV 8.4 (L) 9.2 - 12.9 fL    Immature Granulocytes 0.2 0.0 - 0.5 %    Gran # (ANC) 3.0 1.8 - 7.7 K/uL    Immature Grans (Abs) 0.01 0.00 - 0.04 K/uL    Lymph # 1.0 1.0 - 4.8 K/uL    Mono # 0.4 0.3 - 1.0 K/uL    Eos # 0.1 0.0 - 0.5 K/uL    Baso # 0.02 0.00 - 0.20 K/uL    nRBC 0 0 /100 WBC    Gran % 66.6 38.0 - 73.0 %    Lymph % 23.0 18.0 - 48.0 %    Mono % 7.9 4.0 - 15.0 %    Eosinophil % 1.8 0.0 - 8.0 %    Basophil % 0.5 0.0 - 1.9 %    Differential Method Automated    Comprehensive Metabolic Panel    Collection Time: 04/19/22 12:52 PM   Result Value Ref Range    Sodium 139 136 - 145 mmol/L    Potassium 3.7 3.5 - 5.1 mmol/L    Chloride 105 95 - 110 mmol/L    CO2 27 23 - 29 mmol/L    Glucose 98 70 - 110 mg/dL    BUN 10 6 - 20 mg/dL    Creatinine 0.8 0.5 - 1.4 mg/dL    Calcium 9.2 8.7 - 10.5 mg/dL    Total Protein 7.3 6.0 - 8.4 g/dL    Albumin 3.7 3.5 - 5.2 g/dL    Total Bilirubin 0.4 0.1 - 1.0 mg/dL    Alkaline Phosphatase 76 55 - 135 U/L    AST 18 10 - 40 U/L    ALT 19 10 - 44 U/L    Anion Gap 7 (L) 8 - 16 mmol/L    eGFR if African American >60 >60 mL/min/1.73 m^2    eGFR if non African American >60 >60 mL/min/1.73 m^2   Sedimentation rate     "Collection Time: 04/19/22 12:52 PM   Result Value Ref Range    Sed Rate 33 0 - 36 mm/Hr   C-Reactive Protein    Collection Time: 04/19/22 12:52 PM   Result Value Ref Range    CRP 4.2 0.0 - 8.2 mg/L   SEAMUS Screen w/Reflex    Collection Time: 04/19/22 12:52 PM   Result Value Ref Range    SEAMUS Screen Positive (A) Negative <1:80   C3 COMPLEMENT    Collection Time: 04/19/22 12:52 PM   Result Value Ref Range    Complement (C-3) 121 50 - 180 mg/dL   C4 COMPLEMENT    Collection Time: 04/19/22 12:52 PM   Result Value Ref Range    Complement (C-4) 20 11 - 44 mg/dL   SEAMUS Pattern 1    Collection Time: 04/19/22 12:52 PM   Result Value Ref Range    SEAMUS PATTERN 1 Speckled    SEAMUS Profile    Collection Time: 04/19/22 12:52 PM   Result Value Ref Range    Anti Sm Antibody 0.20 0.00 - 0.99 Ratio    Anti-Sm Interpretation Negative Negative    Anti-SSA Antibody 4.58 (H) 0.00 - 0.99 Ratio    Anti-SSA Interpretation Positive (A) Negative    Anti-SSB Antibody 0.57 0.00 - 0.99 Ratio    Anti-SSB Interpretation Negative Negative    ds DNA Ab Negative 1:10 Negative 1:10    Anti Sm/RNP Antibody 0.21 0.00 - 0.99 Ratio    Anti-Sm/RNP Interpretation Negative Negative   SEAMUS Titer 1    Collection Time: 04/19/22 12:52 PM   Result Value Ref Range    SEAMUS Titer 1 1:320        Assessment:     1. Sjogren's syndrome with keratoconjunctivitis sicca    2. Medication monitoring encounter    3. Encounter for long-term current use of high risk medication    4. Depression, unspecified depression type          Plan:       · Primary sjogrens on 200 mg HCQ 2-3 times per week. Get updated retinal screen asap  · Anti inflammatory diet discussed today- whole 30. Pt encouraged to research further  · Can try OTC magnesium supplement "the calm" to help improve sleep  · no current medication related issues, no evidence of toxicity. I ordered labs for toxicity monitoring;  results reviewed and discussed findings with the patient   · Return to clinic: 1 year w/ reg 4, c3/c4, " SEAMUS    The patient understands, chooses and consents to this plan and accepts all the risks which include but are not limited to the risks mentioned above.     Method of contact with patient concerns: [facility]    60 minutes of total time spent on the encounter, which includes face to face time and non-face to face time preparing to see the patient (eg, review of tests), Obtaining and/or reviewing separately obtained history, Documenting clinical information in the electronic or other health record, Independently interpreting results (not separately reported) and communicating results to the patient/family/caregiver, or Care coordination (not separately reported).          Disclaimer: This note was prepared using a voice recognition system and is likely to have sound alike errors within the text.       Annie Mcintyre PA-C  Rheumatology Department   [facility] [place]

## 2022-09-15 DIAGNOSIS — Z12.31 OTHER SCREENING MAMMOGRAM: ICD-10-CM

## 2022-10-04 DIAGNOSIS — Z76.89 ESTABLISHING CARE WITH NEW DOCTOR, ENCOUNTER FOR: Primary | ICD-10-CM

## 2022-10-05 ENCOUNTER — OFFICE VISIT (OUTPATIENT)
Dept: CARDIOLOGY | Facility: CLINIC | Age: 47
End: 2022-10-05
Payer: COMMERCIAL

## 2022-10-05 ENCOUNTER — HOSPITAL ENCOUNTER (OUTPATIENT)
Dept: CARDIOLOGY | Facility: HOSPITAL | Age: 47
Discharge: HOME | End: 2022-10-05
Attending: INTERNAL MEDICINE
Payer: COMMERCIAL

## 2022-10-05 VITALS
HEART RATE: 110 BPM | HEIGHT: 67 IN | WEIGHT: 206.56 LBS | SYSTOLIC BLOOD PRESSURE: 126 MMHG | OXYGEN SATURATION: 96 % | DIASTOLIC BLOOD PRESSURE: 80 MMHG | BODY MASS INDEX: 32.42 KG/M2

## 2022-10-05 DIAGNOSIS — R76.8 RHEUMATOID FACTOR POSITIVE: ICD-10-CM

## 2022-10-05 DIAGNOSIS — M35.01 SJOGREN'S SYNDROME WITH KERATOCONJUNCTIVITIS SICCA: ICD-10-CM

## 2022-10-05 DIAGNOSIS — G47.33 OBSTRUCTIVE SLEEP APNEA: ICD-10-CM

## 2022-10-05 DIAGNOSIS — R91.8 MULTIPLE PULMONARY NODULES: ICD-10-CM

## 2022-10-05 DIAGNOSIS — R00.2 PALPITATIONS: ICD-10-CM

## 2022-10-05 DIAGNOSIS — R53.82 CHRONIC FATIGUE: ICD-10-CM

## 2022-10-05 DIAGNOSIS — Z76.89 ESTABLISHING CARE WITH NEW DOCTOR, ENCOUNTER FOR: ICD-10-CM

## 2022-10-05 DIAGNOSIS — R06.09 DOE (DYSPNEA ON EXERTION): Primary | ICD-10-CM

## 2022-10-05 PROCEDURE — 93005 ELECTROCARDIOGRAM TRACING: CPT | Mod: PO

## 2022-10-05 PROCEDURE — 99999 PR PBB SHADOW E&M-EST. PATIENT-LVL III: CPT | Mod: PBBFAC,,, | Performed by: INTERNAL MEDICINE

## 2022-10-05 PROCEDURE — 93010 ELECTROCARDIOGRAM REPORT: CPT | Mod: ,,, | Performed by: INTERNAL MEDICINE

## 2022-10-05 PROCEDURE — 93010 EKG 12-LEAD: ICD-10-PCS | Mod: ,,, | Performed by: INTERNAL MEDICINE

## 2022-10-05 PROCEDURE — 99203 PR OFFICE/OUTPT VISIT, NEW, LEVL III, 30-44 MIN: ICD-10-PCS | Mod: S$GLB,,, | Performed by: INTERNAL MEDICINE

## 2022-10-05 PROCEDURE — 99203 OFFICE O/P NEW LOW 30 MIN: CPT | Mod: S$GLB,,, | Performed by: INTERNAL MEDICINE

## 2022-10-05 PROCEDURE — 99999 PR PBB SHADOW E&M-EST. PATIENT-LVL III: ICD-10-PCS | Mod: PBBFAC,,, | Performed by: INTERNAL MEDICINE

## 2022-10-05 NOTE — PROGRESS NOTES
Subjective:   Patient ID:  Olesya Tay is a 47 y.o. female who presents for evaluation of No chief complaint on file.      HPI  47-year-old female, never smoker, history of SEAMUS positive, Sjogren's disease.    Comes in for evaluation of dyspnea on exertion.  NYHA 1. She states not all the time but sporadic.    Also sometimes she has dyspnea while she is resting.  States that she has to yawn for her to feel better.    She denies any lower extremity swelling.  She does have obstructive sleep apnea she is not always consistent with using her CPAP.    She does also report palpitations short lasting.  Also sporadic.  On exam she has either PACs or PVCs.  She reports she was told she had mitral valve prolapse about 10 years ago.  No records available.      Past Medical History:   Diagnosis Date    Constipation     Depression     not taking meds    Endometriosis of pelvis     GERD (gastroesophageal reflux disease)     Gestational diabetes mellitus in pregnancy     Lung nodule     Sjogren's syndrome        History reviewed. No pertinent surgical history.    Social History     Tobacco Use    Smoking status: Never    Smokeless tobacco: Never   Substance Use Topics    Alcohol use: Yes     Comment: socially    Drug use: No       Family History   Problem Relation Age of Onset    Early death Mother 52        hepatitis c    Heart disease Maternal Grandfather     Cancer Paternal Grandmother         stomach    Arthritis Maternal Aunt         rheumatoid    Breast cancer Paternal Aunt        Review of Systems   Constitutional: Negative for fever and malaise/fatigue.   HENT:  Negative for sore throat.    Eyes:  Negative for blurred vision.   Cardiovascular:  Positive for dyspnea on exertion and palpitations. Negative for chest pain, claudication, cyanosis, irregular heartbeat, leg swelling, near-syncope, orthopnea, paroxysmal nocturnal dyspnea and syncope.   Respiratory:  Negative for cough and hemoptysis.     Hematologic/Lymphatic: Negative for bleeding problem.   Skin:  Negative for rash.   Musculoskeletal:  Negative for falls.   Gastrointestinal:  Negative for abdominal pain.   Genitourinary: Negative.    Neurological: Negative.    Psychiatric/Behavioral:  Negative for altered mental status and substance abuse.      Current Outpatient Medications on File Prior to Visit   Medication Sig    hydrOXYchloroQUINE (PLAQUENIL) 200 mg tablet Take 1 tablet (200 mg total) by mouth 2 (two) times daily.    metroNIDAZOLE (METROGEL) 0.75 % gel AAA bid. For redness    omega-3 fatty acids/fish oil (FISH OIL-OMEGA-3 FATTY ACIDS) 300-1,000 mg capsule Take by mouth once daily. occasionally    vitamin E 400 UNIT capsule Take 400 Units by mouth 2 (two) times daily.     No current facility-administered medications on file prior to visit.       Objective:   Objective:  Wt Readings from Last 3 Encounters:   10/05/22 93.7 kg (206 lb 9.1 oz)   04/26/22 93.9 kg (207 lb 0.2 oz)   03/29/22 92.4 kg (203 lb 11.3 oz)     Temp Readings from Last 3 Encounters:   03/06/21 98.1 °F (36.7 °C) (Tympanic)   08/26/19 99.3 °F (37.4 °C) (Tympanic)   03/28/19 98.3 °F (36.8 °C)     BP Readings from Last 3 Encounters:   10/05/22 126/80   04/26/22 124/81   03/29/22 138/82     Pulse Readings from Last 3 Encounters:   10/05/22 110   04/26/22 68   03/29/22 64       Physical Exam  Vitals reviewed.   Constitutional:       Appearance: She is well-developed.   HENT:      Head: Normocephalic and atraumatic.   Eyes:      General: No scleral icterus.     Conjunctiva/sclera: Conjunctivae normal.   Cardiovascular:      Rate and Rhythm: Normal rate and regular rhythm.      Pulses: Intact distal pulses.      Heart sounds: Normal heart sounds. No murmur heard.     Comments: Extraheart beat   Pulmonary:      Effort: No respiratory distress.      Breath sounds: No wheezing or rales.   Chest:      Chest wall: No tenderness.   Abdominal:      General: Bowel sounds are normal. There  is no distension.      Palpations: Abdomen is soft.      Tenderness: There is no guarding.   Musculoskeletal:         General: Normal range of motion.      Cervical back: Normal range of motion and neck supple.   Skin:     General: Skin is warm.   Neurological:      Mental Status: She is alert and oriented to person, place, and time.       Lab Results   Component Value Date    CHOL 187 09/09/2019    CHOL 177 10/22/2018    CHOL 202 (H) 05/25/2017     Lab Results   Component Value Date    HDL 50 09/09/2019    HDL 52 10/22/2018    HDL 57 05/25/2017     Lab Results   Component Value Date    LDLCALC 110.2 09/09/2019    LDLCALC 107.4 10/22/2018    LDLCALC 115.6 05/25/2017     Lab Results   Component Value Date    TRIG 134 09/09/2019    TRIG 88 10/22/2018    TRIG 147 05/25/2017     Lab Results   Component Value Date    CHOLHDL 26.7 09/09/2019    CHOLHDL 29.4 10/22/2018    CHOLHDL 28.2 05/25/2017       Chemistry        Component Value Date/Time     04/19/2022 1252    K 3.7 04/19/2022 1252     04/19/2022 1252    CO2 27 04/19/2022 1252    BUN 10 04/19/2022 1252    CREATININE 0.8 04/19/2022 1252    GLU 98 04/19/2022 1252        Component Value Date/Time    CALCIUM 9.2 04/19/2022 1252    ALKPHOS 76 04/19/2022 1252    AST 18 04/19/2022 1252    ALT 19 04/19/2022 1252    BILITOT 0.4 04/19/2022 1252    ESTGFRAFRICA >60 04/19/2022 1252    EGFRNONAA >60 04/19/2022 1252          Lab Results   Component Value Date    TSH 1.997 02/26/2021     No results found for: INR, PROTIME  Lab Results   Component Value Date    WBC 4.43 04/19/2022    HGB 13.0 04/19/2022    HCT 38.7 04/19/2022    MCV 91 04/19/2022     04/19/2022     BNP  @LABRCNTIP(BNP,BNPTRIAGEBLO)@  CrCl cannot be calculated (Patient's most recent lab result is older than the maximum 7 days allowed.).     Imaging:  ======  No results found for this or any previous visit.    No results found for this or any previous visit.    No results found for this or any  previous visit.    Results for orders placed during the hospital encounter of 02/12/16    X-Ray Chest PA And Lateral    Narrative  2 view chest    Comparison: None    Findings: The lungs are clear and free of infiltrate.  No pleural effusion or pneumothorax is identified.  The heart is not enlarged.  IMPRESSION:      1.  No acute cardiopulmonary process.  ______________________________________    Electronically signed by: REFUGIO GOODEN D.O.  Date:     02/12/16  Time:    09:18    No results found for this or any previous visit.    No valid procedures specified.    Diagnostic Results:  ECG: Reviewed    The ASCVD Risk score (Brijesh DK, et al., 2019) failed to calculate for the following reasons:    Cannot find a previous HDL lab    Cannot find a previous total cholesterol lab    Assessment and Plan:   ASHRAF (dyspnea on exertion)  -     Stress Echo Which stress agent will be used? Treadmill Exercise; Color Flow Doppler? No; Future  -     Holter monitor - 48 hour; Future    Sjogren's syndrome with keratoconjunctivitis sicca    Rheumatoid factor positive    Multiple pulmonary nodules    Chronic fatigue    Palpitations    BMI 32.0-32.9,adult    Obstructive sleep apnea    Reviewed all tests and above medical conditions with patient in detail and formulated treatment plan.  Risk factor modification discussed.   Cardiac low salt diet discussed.  Maintaining healthy weight and weight loss goals were discussed in clinic.  Recommend CPAP compliance.    Follow up in six-month

## 2022-10-24 ENCOUNTER — OFFICE VISIT (OUTPATIENT)
Dept: INTERNAL MEDICINE | Facility: CLINIC | Age: 47
End: 2022-10-24
Payer: COMMERCIAL

## 2022-10-24 VITALS
DIASTOLIC BLOOD PRESSURE: 84 MMHG | HEART RATE: 76 BPM | BODY MASS INDEX: 32.46 KG/M2 | RESPIRATION RATE: 16 BRPM | HEIGHT: 67 IN | WEIGHT: 206.81 LBS | OXYGEN SATURATION: 98 % | SYSTOLIC BLOOD PRESSURE: 124 MMHG

## 2022-10-24 DIAGNOSIS — R41.3 MEMORY DEFICITS: ICD-10-CM

## 2022-10-24 DIAGNOSIS — R91.8 MULTIPLE PULMONARY NODULES: ICD-10-CM

## 2022-10-24 DIAGNOSIS — R53.82 CHRONIC FATIGUE: ICD-10-CM

## 2022-10-24 DIAGNOSIS — Z00.00 ROUTINE GENERAL MEDICAL EXAMINATION AT A HEALTH CARE FACILITY: Primary | ICD-10-CM

## 2022-10-24 DIAGNOSIS — Z78.0 ASYMPTOMATIC MENOPAUSE: ICD-10-CM

## 2022-10-24 DIAGNOSIS — E66.9 OBESITY (BMI 30.0-34.9): ICD-10-CM

## 2022-10-24 DIAGNOSIS — R76.8 RHEUMATOID FACTOR POSITIVE: ICD-10-CM

## 2022-10-24 DIAGNOSIS — M35.01 SJOGREN'S SYNDROME WITH KERATOCONJUNCTIVITIS SICCA: ICD-10-CM

## 2022-10-24 DIAGNOSIS — Z12.11 COLON CANCER SCREENING: ICD-10-CM

## 2022-10-24 DIAGNOSIS — Z91.013 SHRIMP ALLERGY: ICD-10-CM

## 2022-10-24 PROBLEM — E66.811 OBESITY (BMI 30.0-34.9): Status: ACTIVE | Noted: 2022-10-24

## 2022-10-24 PROCEDURE — 99999 PR PBB SHADOW E&M-EST. PATIENT-LVL IV: CPT | Mod: PBBFAC,,, | Performed by: FAMILY MEDICINE

## 2022-10-24 PROCEDURE — 99396 PREV VISIT EST AGE 40-64: CPT | Mod: S$GLB,,, | Performed by: FAMILY MEDICINE

## 2022-10-24 PROCEDURE — 99999 PR PBB SHADOW E&M-EST. PATIENT-LVL IV: ICD-10-PCS | Mod: PBBFAC,,, | Performed by: FAMILY MEDICINE

## 2022-10-24 PROCEDURE — 99396 PR PREVENTIVE VISIT,EST,40-64: ICD-10-PCS | Mod: S$GLB,,, | Performed by: FAMILY MEDICINE

## 2022-10-24 RX ORDER — SERTRALINE HYDROCHLORIDE 25 MG/1
25 TABLET, FILM COATED ORAL DAILY
Qty: 30 TABLET | Refills: 3 | Status: SHIPPED | OUTPATIENT
Start: 2022-10-24 | End: 2023-04-11

## 2022-10-24 RX ORDER — SERTRALINE HYDROCHLORIDE 25 MG/1
25 TABLET, FILM COATED ORAL DAILY
Qty: 30 TABLET | Refills: 3 | Status: SHIPPED | OUTPATIENT
Start: 2022-10-24 | End: 2022-10-24 | Stop reason: CLARIF

## 2022-10-25 ENCOUNTER — OFFICE VISIT (OUTPATIENT)
Dept: ALLERGY | Facility: CLINIC | Age: 47
End: 2022-10-25
Payer: COMMERCIAL

## 2022-10-25 ENCOUNTER — LAB VISIT (OUTPATIENT)
Dept: LAB | Facility: HOSPITAL | Age: 47
End: 2022-10-25
Attending: FAMILY MEDICINE
Payer: COMMERCIAL

## 2022-10-25 VITALS
BODY MASS INDEX: 32.46 KG/M2 | HEART RATE: 81 BPM | TEMPERATURE: 98 F | DIASTOLIC BLOOD PRESSURE: 90 MMHG | WEIGHT: 207.25 LBS | SYSTOLIC BLOOD PRESSURE: 135 MMHG

## 2022-10-25 DIAGNOSIS — Z91.013 SHRIMP ALLERGY: ICD-10-CM

## 2022-10-25 DIAGNOSIS — Z00.00 ROUTINE GENERAL MEDICAL EXAMINATION AT A HEALTH CARE FACILITY: ICD-10-CM

## 2022-10-25 DIAGNOSIS — R53.82 CHRONIC FATIGUE: ICD-10-CM

## 2022-10-25 DIAGNOSIS — R41.3 MEMORY DEFICITS: ICD-10-CM

## 2022-10-25 DIAGNOSIS — J30.89 NON-SEASONAL ALLERGIC RHINITIS, UNSPECIFIED TRIGGER: ICD-10-CM

## 2022-10-25 DIAGNOSIS — Z91.018 FOOD ALLERGY: Primary | ICD-10-CM

## 2022-10-25 LAB
ALBUMIN SERPL BCP-MCNC: 3.8 G/DL (ref 3.5–5.2)
ALP SERPL-CCNC: 81 U/L (ref 55–135)
ALT SERPL W/O P-5'-P-CCNC: 15 U/L (ref 10–44)
ANION GAP SERPL CALC-SCNC: 7 MMOL/L (ref 8–16)
AST SERPL-CCNC: 18 U/L (ref 10–40)
BASOPHILS # BLD AUTO: 0.04 K/UL (ref 0–0.2)
BASOPHILS NFR BLD: 0.7 % (ref 0–1.9)
BILIRUB SERPL-MCNC: 0.5 MG/DL (ref 0.1–1)
BUN SERPL-MCNC: 10 MG/DL (ref 6–20)
CALCIUM SERPL-MCNC: 9.3 MG/DL (ref 8.7–10.5)
CHLORIDE SERPL-SCNC: 101 MMOL/L (ref 95–110)
CHOLEST SERPL-MCNC: 207 MG/DL (ref 120–199)
CHOLEST/HDLC SERPL: 3.6 {RATIO} (ref 2–5)
CO2 SERPL-SCNC: 27 MMOL/L (ref 23–29)
CREAT SERPL-MCNC: 0.7 MG/DL (ref 0.5–1.4)
DIFFERENTIAL METHOD: ABNORMAL
EOSINOPHIL # BLD AUTO: 0.1 K/UL (ref 0–0.5)
EOSINOPHIL NFR BLD: 1.6 % (ref 0–8)
ERYTHROCYTE [DISTWIDTH] IN BLOOD BY AUTOMATED COUNT: 12.6 % (ref 11.5–14.5)
EST. GFR  (NO RACE VARIABLE): >60 ML/MIN/1.73 M^2
ESTIMATED AVG GLUCOSE: 94 MG/DL (ref 68–131)
GLUCOSE SERPL-MCNC: 81 MG/DL (ref 70–110)
HBA1C MFR BLD: 4.9 % (ref 4–5.6)
HCT VFR BLD AUTO: 40 % (ref 37–48.5)
HDLC SERPL-MCNC: 57 MG/DL (ref 40–75)
HDLC SERPL: 27.5 % (ref 20–50)
HGB BLD-MCNC: 13 G/DL (ref 12–16)
HIV 1+2 AB+HIV1 P24 AG SERPL QL IA: NORMAL
IMM GRANULOCYTES # BLD AUTO: 0.01 K/UL (ref 0–0.04)
IMM GRANULOCYTES NFR BLD AUTO: 0.2 % (ref 0–0.5)
LDLC SERPL CALC-MCNC: 127.6 MG/DL (ref 63–159)
LYMPHOCYTES # BLD AUTO: 1.1 K/UL (ref 1–4.8)
LYMPHOCYTES NFR BLD: 19 % (ref 18–48)
MCH RBC QN AUTO: 30 PG (ref 27–31)
MCHC RBC AUTO-ENTMCNC: 32.5 G/DL (ref 32–36)
MCV RBC AUTO: 92 FL (ref 82–98)
MONOCYTES # BLD AUTO: 0.4 K/UL (ref 0.3–1)
MONOCYTES NFR BLD: 7.6 % (ref 4–15)
NEUTROPHILS # BLD AUTO: 4 K/UL (ref 1.8–7.7)
NEUTROPHILS NFR BLD: 70.9 % (ref 38–73)
NONHDLC SERPL-MCNC: 150 MG/DL
NRBC BLD-RTO: 0 /100 WBC
PLATELET # BLD AUTO: 376 K/UL (ref 150–450)
PMV BLD AUTO: 8.6 FL (ref 9.2–12.9)
POTASSIUM SERPL-SCNC: 3.9 MMOL/L (ref 3.5–5.1)
PROT SERPL-MCNC: 7.6 G/DL (ref 6–8.4)
RBC # BLD AUTO: 4.34 M/UL (ref 4–5.4)
SODIUM SERPL-SCNC: 135 MMOL/L (ref 136–145)
TRIGL SERPL-MCNC: 112 MG/DL (ref 30–150)
TSH SERPL DL<=0.005 MIU/L-ACNC: 2.71 UIU/ML (ref 0.4–4)
VIT B12 SERPL-MCNC: 681 PG/ML (ref 210–950)
WBC # BLD AUTO: 5.64 K/UL (ref 3.9–12.7)

## 2022-10-25 PROCEDURE — 99999 PR PBB SHADOW E&M-EST. PATIENT-LVL III: ICD-10-PCS | Mod: PBBFAC,,, | Performed by: PHYSICIAN ASSISTANT

## 2022-10-25 PROCEDURE — 80061 LIPID PANEL: CPT | Performed by: FAMILY MEDICINE

## 2022-10-25 PROCEDURE — 84443 ASSAY THYROID STIM HORMONE: CPT | Performed by: FAMILY MEDICINE

## 2022-10-25 PROCEDURE — 80053 COMPREHEN METABOLIC PANEL: CPT | Performed by: FAMILY MEDICINE

## 2022-10-25 PROCEDURE — 87389 HIV-1 AG W/HIV-1&-2 AB AG IA: CPT | Performed by: FAMILY MEDICINE

## 2022-10-25 PROCEDURE — 99204 OFFICE O/P NEW MOD 45 MIN: CPT | Mod: S$GLB,,, | Performed by: PHYSICIAN ASSISTANT

## 2022-10-25 PROCEDURE — 83036 HEMOGLOBIN GLYCOSYLATED A1C: CPT | Performed by: FAMILY MEDICINE

## 2022-10-25 PROCEDURE — 85025 COMPLETE CBC W/AUTO DIFF WBC: CPT | Performed by: FAMILY MEDICINE

## 2022-10-25 PROCEDURE — 82607 VITAMIN B-12: CPT | Performed by: FAMILY MEDICINE

## 2022-10-25 PROCEDURE — 36415 COLL VENOUS BLD VENIPUNCTURE: CPT | Performed by: FAMILY MEDICINE

## 2022-10-25 PROCEDURE — 99999 PR PBB SHADOW E&M-EST. PATIENT-LVL III: CPT | Mod: PBBFAC,,, | Performed by: PHYSICIAN ASSISTANT

## 2022-10-25 PROCEDURE — 99204 PR OFFICE/OUTPT VISIT, NEW, LEVL IV, 45-59 MIN: ICD-10-PCS | Mod: S$GLB,,, | Performed by: PHYSICIAN ASSISTANT

## 2022-10-25 RX ORDER — PANTOPRAZOLE SODIUM 40 MG/1
40 TABLET, DELAYED RELEASE ORAL DAILY
Qty: 30 TABLET | Refills: 11 | Status: SHIPPED | OUTPATIENT
Start: 2022-10-25 | End: 2023-03-28

## 2022-10-25 RX ORDER — EPINEPHRINE 0.3 MG/.3ML
1 INJECTION SUBCUTANEOUS ONCE
Qty: 2 EACH | Refills: 2 | Status: SHIPPED | OUTPATIENT
Start: 2022-10-25 | End: 2023-11-01

## 2022-10-25 NOTE — PROGRESS NOTES
"Subjective:   Patient: Olesya Tay [id number], :1975   Visit date:10/25/2022 8:54 AM    Chief Complaint:  No chief complaint on file.    HPI:    Prior notes reviewed by myself.  Clinical documentation obtained by nursing staff reviewed.       HPI:     10/25/22 - 48 yo F referred by pcp for an evaluation of a shrimp allergy:    Patient reported an episode of allergic reaction to shrimp 1&1/2 yrs ago - facial swelling, throat swelling, palpitations/BP changes, did not report to ED due to covid, not tx with epipen.   Questionable allergic rxn to other shellfish - crab.   Reported having testing done at the time by an outside allergist, RAST "class 2", told this may not be an allergy but has avoided since. She does not have an updated Epipen at home at this time.   Tolerates fish well.  No other food concerns.     Reported an allergic rxn to her first covid vaccine - caused sob/breathing difficulty. Received booster and did not have this rxn or any other.   Believes sulfites (in wine) cause facial swelling/erythema and throat to tighten/ breathing difficulty. C/o tachycardia/palpitations with wine.   Hx of MVP, seen by cardiologist 2 weeks ago and currently being w/u, stress test and halter.   Hx of SIENA, has cpap, does not use regularly.   Hx of GERD, has taken Nexium prn in past which has helped, not currently on any ppi or anti-acid.   Hx of Sjogren's.     Hx of environmental allergies, well controlled with otc medications.   She does wish to have testing/ know possible triggers.       Environmental History:  Environmental Hx: unremarkable      Past Medical History:   Diagnosis Date    Constipation     Depression     not taking meds    Endometriosis of pelvis     GERD (gastroesophageal reflux disease)     Gestational diabetes mellitus in pregnancy     Lung nodule     Sjogren's syndrome         Family History   Problem Relation Age of Onset    Early death Mother 52        hepatitis c    Heart disease " Maternal Grandfather     Cancer Paternal Grandmother         stomach    Arthritis Maternal Aunt         rheumatoid    Breast cancer Paternal Aunt        Social History     Socioeconomic History    Marital status:     Number of children: 3   Occupational History    Occupation: part time library, selfemployed -rental  property   Tobacco Use    Smoking status: Never    Smokeless tobacco: Never   Substance and Sexual Activity    Alcohol use: Yes     Comment: socially    Drug use: No    Sexual activity: Yes     Partners: Male     Comment:  had vasectomy       Review of patient's allergies indicates:   Allergen Reactions    Doxycycline Shortness Of Breath     Elevated BP and shortness of breath    Shrimp Swelling    Prednisone Hives     Hives after taking medrol dose pack    Pcn [penicillins] Rash           Medication List with Changes/Refills   New Medications    EPINEPHRINE (EPIPEN) 0.3 MG/0.3 ML ATIN    Inject 0.3 mLs (0.3 mg total) into the muscle once. If symptoms not improved repeat in about 5-15 minutes - inject a second dose (pen) of 0.3 mL into muscle once. for 1 dose    PANTOPRAZOLE (PROTONIX) 40 MG TABLET    Take 1 tablet (40 mg total) by mouth once daily.   Current Medications    HYDROXYCHLOROQUINE (PLAQUENIL) 200 MG TABLET    Take 1 tablet (200 mg total) by mouth 2 (two) times daily.    METRONIDAZOLE (METROGEL) 0.75 % GEL    AAA bid. For redness    SERTRALINE (ZOLOFT) 25 MG TABLET    Take 1 tablet (25 mg total) by mouth once daily.         Objective:     Physical Exam:  Vitals:  BP (!) 135/90 (BP Location: Left arm, Patient Position: Sitting)   Pulse 81   Temp 97.7 °F (36.5 °C)   Wt 94 kg (207 lb 3.7 oz)   BMI 32.46 kg/m²   Constitutional:       General: No acute distress. Alert, well developed.   HENT:      Head: Normocephalic, no lesions.      Right Ear: Pinna and external ear appears normal.      Left Ear: Pinna and external ear appears normal.      Nose: No mass or lesion. Clear mucus.  BIT's WNL.     Mouth/Throat: No oropharyngeal exudate or posterior oropharyngeal erythema.   Eyes:      General: No scleral icterus.Sclera white, extraocular movements intact.        Right eye: No discharge.         Left eye: No discharge.   Neck: Supple, no palpable nodes, no masses, trachea midline, no thyromegaly.   Cardiovascular:      Rate and Rhythm: Normal rate and regular rhythm.      Heart sounds: Normal heart sounds. No murmur heard.   Pulmonary:      Effort: Pulmonary effort is normal. No respiratory distress.      Breath sounds: Normal breath sounds. No stridor. No rhonchi, or rales. No crackles or wheezing.   Musculoskeletal:         General: No swelling, tenderness, deformity or signs of injury.    Skin:     General: Skin is warm.      Coloration: Skin is not jaundiced or pale.      Findings: No bruising, erythema, or rash.   Neurological:      Alert. Moves all extremities spontaneously  Psychiatric:         Mood is normal. Behavior is normal.              Assessment & Plan:   Food allergy  -     Clams IgE; Future; Expected date: 10/25/2022  -     Crab IgE; Future; Expected date: 10/25/2022  -     Crayfish, freshwater IgE; Future; Expected date: 10/25/2022  -     Lobster IgE; Future; Expected date: 10/25/2022  -     Oyster IgE; Future; Expected date: 10/25/2022  -     Shrimp IgE; Future; Expected date: 10/25/2022    Shrimp allergy  -     Ambulatory referral/consult to Allergy    Non-seasonal allergic rhinitis, unspecified trigger  -     IgE; Future; Expected date: 10/25/2022  -     RAST Allergen Maple (Divide); Future; Expected date: 10/25/2022  -     RAST Allergen for [place]; Future; Expected date: 10/25/2022  -     Allergen, White Beto; Future; Expected date: 10/25/2022  -     Allergen, Meadow Grass ([facility]); Future; Expected date: 10/25/2022  -     Allergen-Silver Birch; Future; Expected date: 10/25/2022  -     RAST Allergen Ralph; Future; Expected date: 10/25/2022  -     RAST  Allergen, Sheep [place]); Future; Expected date: 10/25/2022  -     Allergen-Alternaria Alternata; Future; Expected date: 10/25/2022  -     Allergen-Maple [place]; Future; Expected date: 10/25/2022  -     Allergen, Hackberry Celtis; Future; Expected date: 10/25/2022  -     Allergen, Elm Cedar; Future; Expected date: 10/25/2022  -     Allergen-Saluda; Future; Expected date: 10/25/2022  -     Allergen, Pecan Tree IgE; Future; Expected date: 10/25/2022  -     Columbus, black IgE; Future; Expected date: 10/25/2022  -     B[place]l, bald IgE; Future; Expected date: 10/25/2022  -     Oak, white IgE; Future; Expected date: 10/25/2022  -     Allergen, Cocklebur; Future; Expected date: 10/25/2022  -     Cat epithelium IgE; Future; Expected date: 10/25/2022  -     Dog dander IgE; Future; Expected date: 10/25/2022  -     Bahia grass IgE; Future; Expected date: 10/25/2022  -     Chaetomium globosum IgE; Future; Expected date: 10/25/2022  -     Cockroach, American IgE; Future; Expected date: 10/25/2022  -     Cladosporium IgE; Future; Expected date: 10/25/2022  -     Curvularia lunata IgE; Future; Expected date: 10/25/2022  -     D. farinae IgE; Future; Expected date: 10/25/2022  -     D. pteronyssinus IgE; Future; Expected date: 10/25/2022  -     Plantain, English IgE; Future; Expected date: 10/25/2022  -     Eucalyptus IgE; Future; Expected date: 10/25/2022  -     Jules elder, rough IgE; Future; Expected date: 10/25/2022  -     Mugwort IgE; Future; Expected date: 10/25/2022  -     Nettle IgE; Future; Expected date: 10/25/2022  -     Orchard grass IgE; Future; Expected date: 10/25/2022  -     [place], western white IgE; Future; Expected date: 10/25/2022  -     Privet, common IgE; Future; Expected date: 10/25/2022  -     Ragweed, short, common IgE; Future; Expected date: 10/25/2022  -     Red top grass IgE; Future; Expected date: 10/25/2022  -     Rye grass, cultivated IgE; Future; Expected date: 10/25/2022  -      Thistle, Russian IgE; Future; Expected date: 10/25/2022  -     Stemphyllium IgE; Future; Expected date: 10/25/2022  -     Eugene IgE; Future; Expected date: 10/25/2022  -     Aakash grass IgE; Future; Expected date: 10/25/2022  -     Penicillium IgE; Future; Expected date: 10/25/2022  -     Aspergillus fumagatus IgE; Future; Expected date: 10/25/2022  -     Setomalanomma rostrata IgE; Future; Expected date: 10/25/2022  -     Bermuda grass IgE; Future; Expected date: 10/25/2022  -     Allergen-Common Pigweed; Future; Expected date: 10/25/2022  -     Allergen, E.Purpurascens; Future; Expected date: 10/25/2022  -     RAST Allergen Candida albicans; Future; Expected date: 10/25/2022    Other orders  -     pantoprazole (PROTONIX) 40 MG tablet; Take 1 tablet (40 mg total) by mouth once daily.  Dispense: 30 tablet; Refill: 11  -     EPINEPHrine (EPIPEN) 0.3 mg/0.3 mL AtIn; Inject 0.3 mLs (0.3 mg total) into the muscle once. If symptoms not improved repeat in about 5-15 minutes - inject a second dose (pen) of 0.3 mL into muscle once. for 1 dose  Dispense: 2 each; Refill: 2    Food allergy testing for shellfish, added inhalants. Will contact pt with results.   Rx for Epipen x 2 sent, advised pt to keep this with her at all times.   Advised resuming a ppi, Protonix, and giving this a trial of 6-8 weeks.   Continue to f/u with cardiologist/workup.     Thank you for allowing me to participate in the care of Rain.        Lacey Guerra PA-C  [facility]  [facility]  [address]  [place] [id number]  P: [phone]  F: [phone]        Total time spent in the care of this patient    New  3  [] 30-44 min  4  [x] 45-59 min  5  [] 60-74 min    Established  3  [] 20-29 min  4  [] 30-39 min  5  [] 40-54 min      [x] preparing to see the patient (eg, review of tests)  [x] obtaining and/or reviewing separately obtained history  [x] performing a medically appropriate examination  and/or evaluation  [x] counseling and educating the patient/family/caregiver  [x] ordering medications, tests, or procedures  [] referring and communicating with other health care professionals (when not separately reported)  [x] documenting clinical information in the electronic or other health record  [] independently interpreting results (not separately reported) and communicating results to the  patient/ family/caregiver  [] care coordination (not separately reported)

## 2022-10-26 NOTE — PROGRESS NOTES
"Subjective:       Patient ID: Olesya Tay is a 47 y.o. female.    Chief Complaint: Annual Exam    47-year-old female patient with Patient Active Problem List:     Sjogren's syndrome with keratoconjunctivitis sicca     Rheumatoid factor positive     Multiple pulmonary nodules     Rosacea     Encounter for long-term current use of high risk medication     Xerostomia due to autoimmune disease     Chronic fatigue     History of gestational diabetes     Hair loss     Obesity (BMI 30.0-34.9)  Here for routine annual physicals and reports that patient has been mildly depressed and has been having problems with short-term memory, has been feeling tired easily.  Taking her medications regularly.  Denies any chest pain or difficulty breathing with palpitations, nausea vomiting  Patient reports having shrimp allergy and would like to discuss further with allergy specialist    Review of Systems   Constitutional:  Negative for fatigue.   Eyes:  Negative for visual disturbance.   Respiratory:  Negative for shortness of breath.    Cardiovascular:  Negative for chest pain and leg swelling.   Gastrointestinal:  Negative for abdominal pain, nausea and vomiting.   Musculoskeletal:  Negative for myalgias.   Skin:  Negative for rash.   Neurological:  Negative for light-headedness and headaches.   Psychiatric/Behavioral:  Positive for decreased concentration and dysphoric mood. Negative for sleep disturbance.        /84 (BP Location: Right arm, Patient Position: Sitting, BP Method: Large (Manual))   Pulse 76   Resp 16   Ht 5' 7" (1.702 m)   Wt 93.8 kg (206 lb 12.7 oz)   SpO2 98%   BMI 32.39 kg/m²   Objective:      Physical Exam  Constitutional:       Appearance: She is well-developed.   HENT:      Head: Normocephalic and atraumatic.   Cardiovascular:      Rate and Rhythm: Normal rate and regular rhythm.      Heart sounds: Normal heart sounds. No murmur heard.  Pulmonary:      Effort: Pulmonary effort is normal.      " Breath sounds: Normal breath sounds. No wheezing.   Abdominal:      General: Bowel sounds are normal.      Palpations: Abdomen is soft.      Tenderness: There is no abdominal tenderness.   Skin:     General: Skin is warm and dry.      Findings: No rash.   Neurological:      Mental Status: She is alert and oriented to person, place, and time.   Psychiatric:         Mood and Affect: Mood normal.         Assessment/Plan:   1. Routine general medical examination at a health care facility  - CBC Auto Differential; Future  - Comprehensive Metabolic Panel; Future  - Lipid Panel; Future  - TSH; Future  - Hemoglobin A1C; Future  - HIV 1/2 Ag/Ab (4th Gen); Future  - Urinalysis, Reflex to Urine Culture Urine, Clean Catch; Future  - Vitamin B12; Future  Vital signs stable today.  Clinical exam stable  Continue lifestyle modifications with low-fat and low-cholesterol diet and exercise 30 minutes daily      2. Chronic fatigue  - CBC Auto Differential; Future  - Comprehensive Metabolic Panel; Future  - sertraline (ZOLOFT) 25 MG tablet; Take 1 tablet (25 mg total) by mouth once daily. (Patient not taking: Reported on 10/25/2022)  Dispense: 30 tablet; Refill: 3  Encouraged to start Zoloft 25 mg daily and eat protein rich diet    3. Rheumatoid factor positive  5. Sjogren's syndrome with keratoconjunctivitis sicca  Followed by Rheumatology currently taking Plaquenil 200 mg twice daily    4. Multiple pulmonary nodules  Stable and asymptomatic    5. Sjogren's syndrome with keratoconjunctivitis sicca    6. Shrimp allergy  - Ambulatory referral/consult to Allergy; Future  Referral has been placed    7. Colon cancer screening  - Cologuard Screening (Multitarget Stool DNA); Future  - Cologuard Screening (Multitarget Stool DNA)  Cologuard orders placed today    8. Asymptomatic menopause  - DXA Bone Density Spine And Hip; Future  Due for DEXA scan    9. Memory deficits  - Vitamin B12; Future  - sertraline (ZOLOFT) 25 MG tablet; Take 1 tablet  (25 mg total) by mouth once daily. (Patient not taking: Reported on 10/25/2022)  Dispense: 30 tablet; Refill: 3  Will get started on Zoloft 25 mg daily  Patient to let us know if having ongoing symptoms    10. Obesity (BMI 30.0-34.9)  Lifestyle modifications discussed to lose weight with BMI 32

## 2022-11-03 ENCOUNTER — PATIENT MESSAGE (OUTPATIENT)
Dept: ALLERGY | Facility: CLINIC | Age: 47
End: 2022-11-03
Payer: COMMERCIAL

## 2022-11-15 ENCOUNTER — PATIENT MESSAGE (OUTPATIENT)
Dept: ALLERGY | Facility: CLINIC | Age: 47
End: 2022-11-15
Payer: COMMERCIAL

## 2022-11-22 ENCOUNTER — PATIENT MESSAGE (OUTPATIENT)
Dept: ADMINISTRATIVE | Facility: HOSPITAL | Age: 47
End: 2022-11-22
Payer: COMMERCIAL

## 2022-11-24 LAB — NONINV COLON CA DNA+OCC BLD SCRN STL QL: NEGATIVE

## 2022-12-07 ENCOUNTER — TELEPHONE (OUTPATIENT)
Dept: PULMONOLOGY | Facility: CLINIC | Age: 47
End: 2022-12-07
Payer: COMMERCIAL

## 2022-12-07 DIAGNOSIS — R91.8 MULTIPLE PULMONARY NODULES: Primary | ICD-10-CM

## 2022-12-09 ENCOUNTER — HOSPITAL ENCOUNTER (OUTPATIENT)
Dept: RADIOLOGY | Facility: HOSPITAL | Age: 47
Discharge: HOME | End: 2022-12-09
Attending: INTERNAL MEDICINE
Payer: COMMERCIAL

## 2022-12-09 ENCOUNTER — OFFICE VISIT (OUTPATIENT)
Dept: PULMONOLOGY | Facility: CLINIC | Age: 47
End: 2022-12-09
Payer: COMMERCIAL

## 2022-12-09 VITALS
HEIGHT: 67 IN | OXYGEN SATURATION: 98 % | RESPIRATION RATE: 14 BRPM | HEART RATE: 82 BPM | SYSTOLIC BLOOD PRESSURE: 132 MMHG | BODY MASS INDEX: 32.94 KG/M2 | DIASTOLIC BLOOD PRESSURE: 84 MMHG | WEIGHT: 209.88 LBS

## 2022-12-09 DIAGNOSIS — G47.33 OSA ON CPAP: ICD-10-CM

## 2022-12-09 DIAGNOSIS — R76.8 RHEUMATOID FACTOR POSITIVE: ICD-10-CM

## 2022-12-09 DIAGNOSIS — E66.9 OBESITY (BMI 30.0-34.9): ICD-10-CM

## 2022-12-09 DIAGNOSIS — R91.8 MULTIPLE PULMONARY NODULES: ICD-10-CM

## 2022-12-09 DIAGNOSIS — R91.8 MULTIPLE PULMONARY NODULES: Primary | ICD-10-CM

## 2022-12-09 PROCEDURE — 99214 OFFICE O/P EST MOD 30 MIN: CPT | Mod: S$GLB,,, | Performed by: INTERNAL MEDICINE

## 2022-12-09 PROCEDURE — 99999 PR PBB SHADOW E&M-EST. PATIENT-LVL IV: CPT | Mod: PBBFAC,,, | Performed by: INTERNAL MEDICINE

## 2022-12-09 PROCEDURE — 71046 X-RAY EXAM CHEST 2 VIEWS: CPT | Mod: 26,,, | Performed by: RADIOLOGY

## 2022-12-09 PROCEDURE — 99214 PR OFFICE/OUTPT VISIT, EST, LEVL IV, 30-39 MIN: ICD-10-PCS | Mod: S$GLB,,, | Performed by: INTERNAL MEDICINE

## 2022-12-09 PROCEDURE — 71046 X-RAY EXAM CHEST 2 VIEWS: CPT | Mod: TC

## 2022-12-09 PROCEDURE — 99999 PR PBB SHADOW E&M-EST. PATIENT-LVL IV: ICD-10-PCS | Mod: PBBFAC,,, | Performed by: INTERNAL MEDICINE

## 2022-12-09 PROCEDURE — 71046 XR CHEST PA AND LATERAL: ICD-10-PCS | Mod: 26,,, | Performed by: RADIOLOGY

## 2022-12-09 NOTE — PROGRESS NOTES
Pulmonary Outpatient  Visit     Subjective:       Patient ID: Olesya Tay is a 47 y.o. female.    Chief Complaint: Sleep Apnea      Olesya Tay is 47 y.o.  Last seen 03/29/2022  SIENA on CPAP 5-15  Usage > 4 hrs dropped   Used only 3 days  Last visit was using daily  Pressure and discomfort issues  Asked about MAD  Will change to fixed CPAP  Last ct chest nodule 5 mm   Needed f/u: missed f/u during COVID  Normal PFT in past                 The following portions of the patient's history were reviewed and updated as appropriate:   She  has a past medical history of Constipation; Depression; Endometriosis of pelvis; GERD (gastroesophageal reflux disease); Gestational diabetes mellitus in pregnancy; Lung nodule; and Sjogren's syndrome.  She  does not have any pertinent problems on file.  She  has no past surgical history on file.  Her family history includes Arthritis in her maternal aunt; Cancer in her paternal grandmother; Early death (age of onset: 52) in her mother; Heart disease in her maternal grandfather.  She  reports that she has never smoked. She has never used smokeless tobacco. She reports that she drinks alcohol. She reports that she does not use illicit drugs.  She has a current medication list which includes the following prescription(s): esomeprazole magnesium, fish oil-omega-3 fatty acids, hydroxychloroquine, and vitamin e.      The following portions of the patient's history were reviewed and updated as appropriate: She  has a past medical history of Constipation, Depression, Endometriosis of pelvis, GERD (gastroesophageal reflux disease), Gestational diabetes mellitus in pregnancy, Lung nodule, and Sjogren's syndrome.  She does not have any pertinent problems on file.  She  has no past surgical history on file.  Her family history includes Arthritis in her maternal aunt; Breast cancer in her paternal aunt; Cancer in her paternal grandmother;  Early death (age of onset: 52) in her mother; Heart disease in her maternal grandfather.  She  reports that she has never smoked. She has never used smokeless tobacco. She reports current alcohol use. She reports that she does not use drugs.  She has a current medication list which includes the following prescription(s): epinephrine, hydroxychloroquine, metronidazole, nystatin-triamcinolone, pantoprazole, and sertraline.  Current Outpatient Medications on File Prior to Visit   Medication Sig Dispense Refill    EPINEPHrine (EPIPEN) 0.3 mg/0.3 mL AtIn Inject 0.3 mLs (0.3 mg total) into the muscle once. If symptoms not improved repeat in about 5-15 minutes - inject a second dose (pen) of 0.3 mL into muscle once. for 1 dose 2 each 2    hydrOXYchloroQUINE (PLAQUENIL) 200 mg tablet Take 1 tablet (200 mg total) by mouth 2 (two) times daily. 180 tablet 3    metroNIDAZOLE (METROGEL) 0.75 % gel AAA bid. For redness 45 g 3    nystatin-triamcinolone (MYCOLOG II) cream Apply to affected area 2 times daily 30 g 3    pantoprazole (PROTONIX) 40 MG tablet Take 1 tablet (40 mg total) by mouth once daily. 30 tablet 11    sertraline (ZOLOFT) 25 MG tablet Take 1 tablet (25 mg total) by mouth once daily. 30 tablet 3     No current facility-administered medications on file prior to visit.     She is allergic to doxycycline, shrimp, prednisone, and pcn [penicillins]..     Review of Systems   Respiratory: Negative.     All other systems reviewed and are negative.    Outpatient Encounter Medications as of 12/9/2022   Medication Sig Dispense Refill    EPINEPHrine (EPIPEN) 0.3 mg/0.3 mL AtIn Inject 0.3 mLs (0.3 mg total) into the muscle once. If symptoms not improved repeat in about 5-15 minutes - inject a second dose (pen) of 0.3 mL into muscle once. for 1 dose 2 each 2    hydrOXYchloroQUINE (PLAQUENIL) 200 mg tablet Take 1 tablet (200 mg total) by mouth 2 (two) times daily. 180 tablet 3    metroNIDAZOLE (METROGEL) 0.75 % gel AAA bid. For  "redness 45 g 3    nystatin-triamcinolone (MYCOLOG II) cream Apply to affected area 2 times daily 30 g 3    pantoprazole (PROTONIX) 40 MG tablet Take 1 tablet (40 mg total) by mouth once daily. 30 tablet 11    sertraline (ZOLOFT) 25 MG tablet Take 1 tablet (25 mg total) by mouth once daily. 30 tablet 3     No facility-administered encounter medications on file as of 12/9/2022.       Objective:     Vital Signs (Most Recent)  Vital Signs  Pulse: 82  Resp: 14  SpO2: 98 %  BP: 132/84  Height and Weight  Height: 5' 7" (170.2 cm)  Weight: 95.2 kg (209 lb 14.1 oz)  BSA (Calculated - sq m): 2.12 sq meters  BMI (Calculated): 32.9  Weight in (lb) to have BMI = 25: 159.3]  Wt Readings from Last 2 Encounters:   12/09/22 95.2 kg (209 lb 14.1 oz)   12/07/22 95.7 kg (211 lb)       Physical Exam   Constitutional: She is oriented to person, place, and time. She appears well-developed and well-nourished.   HENT:   Head: Normocephalic.   Mouth/Throat: Mallampati Score: II.   Neck: No JVD present.   Cardiovascular: Normal rate and intact distal pulses.   No murmur heard.  Pulmonary/Chest: Normal expansion, effort normal and breath sounds normal.   Abdominal: Soft. Bowel sounds are normal.   Musculoskeletal:         General: No edema. Normal range of motion.      Cervical back: Normal range of motion and neck supple.   Lymphadenopathy:     She has no axillary adenopathy.   Neurological: She is alert and oriented to person, place, and time.   Skin: Skin is warm and dry.   Psychiatric: She has a normal mood and affect.   Nursing note and vitals reviewed.    Laboratory  Lab Results   Component Value Date    WBC 5.64 10/25/2022    RBC 4.34 10/25/2022    HGB 13.0 10/25/2022    HCT 40.0 10/25/2022    MCV 92 10/25/2022    MCH 30.0 10/25/2022    MCHC 32.5 10/25/2022    RDW 12.6 10/25/2022     10/25/2022    MPV 8.6 (L) 10/25/2022    GRAN 4.0 10/25/2022    GRAN 70.9 10/25/2022    LYMPH 1.1 10/25/2022    LYMPH 19.0 10/25/2022    MONO 0.4 " 10/25/2022    MONO 7.6 10/25/2022    EOS 0.1 10/25/2022    BASO 0.04 10/25/2022    EOSINOPHIL 1.6 10/25/2022    BASOPHIL 0.7 10/25/2022       BMP  Lab Results   Component Value Date     (L) 10/25/2022    K 3.9 10/25/2022     10/25/2022    CO2 27 10/25/2022    BUN 10 10/25/2022    CREATININE 0.7 10/25/2022    CALCIUM 9.3 10/25/2022    ANIONGAP 7 (L) 10/25/2022    ESTGFRAFRICA >60 04/19/2022    EGFRNONAA >60 04/19/2022    AST 18 10/25/2022    ALT 15 10/25/2022    PROT 7.6 10/25/2022       Lab Results   Component Value Date    BNP 49 07/31/2015       Lab Results   Component Value Date    TSH 2.706 10/25/2022       Lab Results   Component Value Date    SEDRATE 33 04/19/2022       Lab Results   Component Value Date    CRP 4.2 04/19/2022     Lab Results   Component Value Date    IGE <35 10/25/2022    IGE <35 02/26/2021        Lab Results   Component Value Date    ASPERGILLUS <0.10 10/25/2022     Lab Results   Component Value Date    AFUMIGATUSCL CLASS 0 10/25/2022        No results found for: ACE     Diagnostic Results:  I have personally reviewed today the following studies:    X-Ray Chest PA And Lateral  Narrative: EXAMINATION:  XR CHEST PA AND LATERAL    CLINICAL HISTORY:  Other nonspecific abnormal finding of lung field    TECHNIQUE:  PA and lateral views of the chest were performed.    COMPARISON:  02/12/2016.    FINDINGS:  The lungs are clear.  No focal consolidation.  Heart size is normal.  Mediastinal contours unremarkable.    Bony thorax intact.  Impression: No acute chest disease.    Electronically signed by: Cheng Munroe  Date:    12/09/2022  Time:    09:03       Compliance Report  Compliance  Payor Standard  Usage 11/08/2022 - 12/07/2022  Usage days 4/30 days (13%)  >= 4 hours 0 days (0%)  < 4 hours 4 days (13%)  Usage hours 5 hours 24 minutes  Average usage (total days) 11 minutes  Average usage (days used) 1 hours 21 minutes  Median usage (days used) 1 hours 6 minutes  Total used hours (value  since last reset - 12/07/2022) 227 hours  AirSense 11 AutoSet  Serial number [id number]  Mode AutoSet  Min Pressure 5 cmH2O  Max Pressure 15 cmH2O  EPR Fulltime  EPR level 3  Response Standard  Therapy  Pressure - cmH2O Median: 5.6 95th percentile: 7.4 Maximum: 7.9  Leaks - L/min Median: 2.7 95th percentile: 6.9 Maximum: 7.5  Events per hour AI: 2.4 HI: 0.0 AHI: 2.4  Apnea Index Central: 0.3 Obstructive: 2.0 Unknown: 0.1  Assessment/Plan:     Problem List Items Addressed This Visit       Rheumatoid factor positive    Multiple pulmonary nodules - Primary     Follow up chest CT         Relevant Orders    CT Chest Without Contrast    Obesity (BMI 30.0-34.9)     General weight loss/lifestyle modification strategies discussed (elicit support from others; identify saboteurs; non-food rewards).  Diet interventions: low calorie (1000 kCal/d) deficit diet          SIENA on CPAP     Bronson score 5  Usage poor: pressure intolerance  Will lower pressure         Relevant Orders    HME - OTHER          Follow up in about 3 months (around 3/9/2023), or chest CT next week, weight losss, Change to CPAP 6 cmwp, download in 12 weeks.    This note was prepared using voice recognition system and is likely to have sound alike errors that may have been overlooked even after proof reading.  Please call me with any questions    Discussed diagnosis, its evaluation, treatment and usual course. All questions answered.      Thom Velazquez MD

## 2022-12-13 ENCOUNTER — HOSPITAL ENCOUNTER (OUTPATIENT)
Dept: RADIOLOGY | Facility: HOSPITAL | Age: 47
Discharge: HOME | End: 2022-12-13
Attending: INTERNAL MEDICINE
Payer: COMMERCIAL

## 2022-12-13 DIAGNOSIS — R91.8 MULTIPLE PULMONARY NODULES: ICD-10-CM

## 2022-12-13 PROCEDURE — 71250 CT CHEST WITHOUT CONTRAST: ICD-10-PCS | Mod: 26,,, | Performed by: RADIOLOGY

## 2022-12-13 PROCEDURE — 71250 CT THORAX DX C-: CPT | Mod: TC,PO

## 2022-12-13 PROCEDURE — 71250 CT THORAX DX C-: CPT | Mod: 26,,, | Performed by: RADIOLOGY

## 2022-12-14 ENCOUNTER — APPOINTMENT (OUTPATIENT)
Dept: RADIOLOGY | Facility: HOSPITAL | Age: 47
End: 2022-12-14
Attending: FAMILY MEDICINE
Payer: COMMERCIAL

## 2022-12-14 DIAGNOSIS — Z78.0 ASYMPTOMATIC MENOPAUSE: ICD-10-CM

## 2022-12-14 PROCEDURE — 77080 DXA BONE DENSITY AXIAL: CPT | Mod: 26,,, | Performed by: RADIOLOGY

## 2022-12-14 PROCEDURE — 77080 DEXA BONE DENSITY SPINE HIP: ICD-10-PCS | Mod: 26,,, | Performed by: RADIOLOGY

## 2022-12-14 PROCEDURE — 77080 DXA BONE DENSITY AXIAL: CPT | Mod: TC

## 2022-12-21 ENCOUNTER — PATIENT MESSAGE (OUTPATIENT)
Dept: INTERNAL MEDICINE | Facility: CLINIC | Age: 47
End: 2022-12-21
Payer: COMMERCIAL

## 2022-12-22 DIAGNOSIS — E66.9 OBESITY (BMI 30.0-34.9): Primary | ICD-10-CM

## 2022-12-22 RX ORDER — SEMAGLUTIDE 1.34 MG/ML
0.25 INJECTION, SOLUTION SUBCUTANEOUS
Qty: 1 PEN | Refills: 5 | Status: SHIPPED | OUTPATIENT
Start: 2022-12-22 | End: 2023-01-30 | Stop reason: SDUPTHER

## 2022-12-30 ENCOUNTER — HOSPITAL ENCOUNTER (OUTPATIENT)
Dept: CARDIOLOGY | Facility: HOSPITAL | Age: 47
Discharge: HOME | End: 2022-12-30
Attending: INTERNAL MEDICINE
Payer: COMMERCIAL

## 2022-12-30 VITALS
BODY MASS INDEX: 32.8 KG/M2 | DIASTOLIC BLOOD PRESSURE: 80 MMHG | WEIGHT: 209 LBS | SYSTOLIC BLOOD PRESSURE: 130 MMHG | HEIGHT: 67 IN

## 2022-12-30 DIAGNOSIS — R06.09 DOE (DYSPNEA ON EXERTION): ICD-10-CM

## 2022-12-30 LAB
AV INDEX (PROSTH): 0.61
AV MEAN GRADIENT: 5 MMHG
AV PEAK GRADIENT: 9 MMHG
AV VALVE AREA: 1.84 CM2
AV VELOCITY RATIO: 0.62
BSA FOR ECHO PROCEDURE: 2.12 M2
CV ECHO LV RWT: 0.4 CM
CV STRESS BASE HR: 88 BPM
DIASTOLIC BLOOD PRESSURE: 80 MMHG
DOP CALC AO PEAK VEL: 1.54 M/S
DOP CALC AO VTI: 32.5 CM
DOP CALC LVOT AREA: 3 CM2
DOP CALC LVOT DIAMETER: 1.96 CM
DOP CALC LVOT PEAK VEL: 0.96 M/S
DOP CALC LVOT STROKE VOLUME: 59.71 CM3
DOP CALC RVOT PEAK VEL: 0.66 M/S
DOP CALC RVOT VTI: 13.8 CM
DOP CALCLVOT PEAK VEL VTI: 19.8 CM
E WAVE DECELERATION TIME: 157.45 MSEC
E/A RATIO: 0.85
E/E' RATIO: 8.4 M/S
ECHO LV POSTERIOR WALL: 0.96 CM (ref 0.6–1.1)
EJECTION FRACTION: 65 %
FRACTIONAL SHORTENING: 33 % (ref 28–44)
INTERVENTRICULAR SEPTUM: 0.95 CM (ref 0.6–1.1)
LA MAJOR: 5.28 CM
LA MINOR: 5.01 CM
LA WIDTH: 3.7 CM
LEFT ATRIUM SIZE: 4.37 CM
LEFT ATRIUM VOLUME INDEX MOD: 27.9 ML/M2
LEFT ATRIUM VOLUME INDEX: 34.3 ML/M2
LEFT ATRIUM VOLUME MOD: 57.39 CM3
LEFT ATRIUM VOLUME: 70.66 CM3
LEFT INTERNAL DIMENSION IN SYSTOLE: 3.24 CM (ref 2.1–4)
LEFT VENTRICLE DIASTOLIC VOLUME INDEX: 52.66 ML/M2
LEFT VENTRICLE DIASTOLIC VOLUME: 108.47 ML
LEFT VENTRICLE MASS INDEX: 78 G/M2
LEFT VENTRICLE SYSTOLIC VOLUME INDEX: 20.4 ML/M2
LEFT VENTRICLE SYSTOLIC VOLUME: 42.06 ML
LEFT VENTRICULAR INTERNAL DIMENSION IN DIASTOLE: 4.82 CM (ref 3.5–6)
LEFT VENTRICULAR MASS: 161.04 G
LV LATERAL E/E' RATIO: 7.88 M/S
LV SEPTAL E/E' RATIO: 9 M/S
LVOT MG: 2.12 MMHG
LVOT MV: 0.68 CM/S
MV PEAK A VEL: 0.74 M/S
MV PEAK E VEL: 0.63 M/S
OHS CV CPX 1 MINUTE RECOVERY HEART RATE: 151 BPM
OHS CV CPX 85 PERCENT MAX PREDICTED HEART RATE MALE: 139
OHS CV CPX ESTIMATED METS: 9
OHS CV CPX MAX PREDICTED HEART RATE: 164
OHS CV CPX PATIENT IS FEMALE: 1
OHS CV CPX PATIENT IS MALE: 0
OHS CV CPX PEAK DIASTOLIC BLOOD PRESSURE: 75 MMHG
OHS CV CPX PEAK HEAR RATE: 187 BPM
OHS CV CPX PEAK RATE PRESSURE PRODUCT: NORMAL
OHS CV CPX PEAK SYSTOLIC BLOOD PRESSURE: 256 MMHG
OHS CV CPX PERCENT MAX PREDICTED HEART RATE ACHIEVED: 114
OHS CV CPX RATE PRESSURE PRODUCT PRESENTING: NORMAL
PISA TR MAX VEL: 2.44 M/S
PULM VEIN S/D RATIO: 1.77
PV MEAN GRADIENT: 1.09 MMHG
PV PEAK D VEL: 0.35 M/S
PV PEAK S VEL: 0.62 M/S
PV PEAK VELOCITY: 1.3 CM/S
RA MAJOR: 4.65 CM
RA PRESSURE: 3 MMHG
RA WIDTH: 4.17 CM
RIGHT VENTRICULAR END-DIASTOLIC DIMENSION: 3.36 CM
SINUS: 2.78 CM
STJ: 2.44 CM
STRESS ANGINA INDEX: 0
STRESS DUKE TREADMILL SCORE: 7
STRESS ECHO POST EXERCISE DUR MIN: 7 MINUTES
STRESS ECHO POST EXERCISE DUR SEC: 5 SECONDS
STRESS ST DEPRESSION: 0 MM
STRESS ST ELEVATION: 0 MM
SYSTOLIC BLOOD PRESSURE: 130 MMHG
TDI LATERAL: 0.08 M/S
TDI SEPTAL: 0.07 M/S
TDI: 0.08 M/S
TR MAX PG: 24 MMHG
TRICUSPID ANNULAR PLANE SYSTOLIC EXCURSION: 2.7 CM
TV REST PULMONARY ARTERY PRESSURE: 27 MMHG

## 2022-12-30 PROCEDURE — 93351 STRESS TTE COMPLETE: CPT

## 2022-12-30 PROCEDURE — 93320 STRESS ECHO (CUPID ONLY): ICD-10-PCS | Mod: 26,,, | Performed by: INTERNAL MEDICINE

## 2022-12-30 PROCEDURE — 93325 STRESS ECHO (CUPID ONLY): ICD-10-PCS | Mod: 26,,, | Performed by: INTERNAL MEDICINE

## 2022-12-30 PROCEDURE — 93351 STRESS TTE COMPLETE: CPT | Mod: 26,,, | Performed by: INTERNAL MEDICINE

## 2022-12-30 PROCEDURE — 93320 DOPPLER ECHO COMPLETE: CPT

## 2022-12-30 PROCEDURE — 93225 XTRNL ECG REC<48 HRS REC: CPT

## 2022-12-30 PROCEDURE — 93227 HOLTER MONITOR - 48 HOUR (CUPID ONLY): ICD-10-PCS | Mod: ,,, | Performed by: INTERNAL MEDICINE

## 2022-12-30 PROCEDURE — 93351 STRESS ECHO (CUPID ONLY): ICD-10-PCS | Mod: 26,,, | Performed by: INTERNAL MEDICINE

## 2022-12-30 PROCEDURE — 93227 XTRNL ECG REC<48 HR R&I: CPT | Mod: ,,, | Performed by: INTERNAL MEDICINE

## 2022-12-30 PROCEDURE — 93325 DOPPLER ECHO COLOR FLOW MAPG: CPT | Mod: 26,,, | Performed by: INTERNAL MEDICINE

## 2022-12-30 PROCEDURE — 93320 DOPPLER ECHO COMPLETE: CPT | Mod: 26,,, | Performed by: INTERNAL MEDICINE

## 2023-01-03 LAB
OHS CV EVENT MONITOR DAY: 2
OHS CV HOLTER LENGTH DECIMAL HOURS: 96
OHS CV HOLTER LENGTH HOURS: 48
OHS CV HOLTER LENGTH MINUTES: 0
OHS CV HOLTER SINUS AVERAGE HR: 85
OHS CV HOLTER SINUS MAX HR: 154
OHS CV HOLTER SINUS MIN HR: 56

## 2023-01-30 ENCOUNTER — TELEPHONE (OUTPATIENT)
Dept: INTERNAL MEDICINE | Facility: CLINIC | Age: 48
End: 2023-01-30
Payer: COMMERCIAL

## 2023-01-30 DIAGNOSIS — E66.9 OBESITY (BMI 30.0-34.9): ICD-10-CM

## 2023-01-30 RX ORDER — SEMAGLUTIDE 1.34 MG/ML
0.25 INJECTION, SOLUTION SUBCUTANEOUS
Qty: 1 PEN | Refills: 5 | Status: SHIPPED | OUTPATIENT
Start: 2023-01-30 | End: 2023-03-28 | Stop reason: DRUGHIGH

## 2023-01-30 NOTE — TELEPHONE ENCOUNTER
----- Message from Alesia Finn sent at 1/30/2023 11:50 AM CST -----  Contact: Olesya Dacosta is calling to speak to Michelle regarding her medication ozempic. She found a location but the dosage has to be increased to .5mg. Please give her a call back at [phone]    Thanks  LJ

## 2023-01-30 NOTE — TELEPHONE ENCOUNTER
----- Message from Otilia Horton sent at 1/30/2023  8:18 AM CST -----  Contact: patient  Olesya Tay would like a call back at [phone], in regards to her Ozempic prescription. Pt states she might need a replacement, due to the pharmacy being out os stock.

## 2023-01-30 NOTE — TELEPHONE ENCOUNTER
Care Due:                  Date            Visit Type   Department     Provider  --------------------------------------------------------------------------------                                EP -                              PRIMARY      [facility]  Beena Mainampati  Last Visit: 10-      CARE ([facility])   MEDICINE       Doyle  Next Visit: None Scheduled  None         None Found                                                            Last  Test          Frequency    Reason                     Performed    Due Date  --------------------------------------------------------------------------------    HBA1C.......  6 months...  semaglutide..............  10-   04-    [facility] Embedded Care Gaps. Reference number: [id number]. 1/30/2023   2:58:50 PM CST

## 2023-02-13 ENCOUNTER — TELEPHONE (OUTPATIENT)
Dept: SLEEP MEDICINE | Facility: CLINIC | Age: 48
End: 2023-02-13
Payer: COMMERCIAL

## 2023-02-13 DIAGNOSIS — R91.8 MULTIPLE PULMONARY NODULES: Primary | ICD-10-CM

## 2023-02-13 NOTE — TELEPHONE ENCOUNTER
Repeat chest CT 12/2023  Orders Placed This Encounter   Procedures    CT Chest Without Contrast     Standing Status:   Future     Standing Expiration Date:   2/13/2024

## 2023-03-10 ENCOUNTER — OFFICE VISIT (OUTPATIENT)
Dept: PULMONOLOGY | Facility: CLINIC | Age: 48
End: 2023-03-10
Payer: COMMERCIAL

## 2023-03-10 VITALS
HEART RATE: 73 BPM | OXYGEN SATURATION: 97 % | WEIGHT: 199.75 LBS | SYSTOLIC BLOOD PRESSURE: 122 MMHG | RESPIRATION RATE: 18 BRPM | HEIGHT: 67 IN | BODY MASS INDEX: 31.35 KG/M2 | DIASTOLIC BLOOD PRESSURE: 78 MMHG

## 2023-03-10 DIAGNOSIS — G47.33 OSA ON CPAP: Primary | ICD-10-CM

## 2023-03-10 DIAGNOSIS — M35.01 SJOGREN'S SYNDROME WITH KERATOCONJUNCTIVITIS SICCA: ICD-10-CM

## 2023-03-10 DIAGNOSIS — R53.82 CHRONIC FATIGUE: ICD-10-CM

## 2023-03-10 DIAGNOSIS — E66.9 OBESITY (BMI 30.0-34.9): ICD-10-CM

## 2023-03-10 DIAGNOSIS — R91.8 MULTIPLE PULMONARY NODULES: ICD-10-CM

## 2023-03-10 PROCEDURE — 3078F DIAST BP <80 MM HG: CPT | Mod: CPTII,S$GLB,, | Performed by: INTERNAL MEDICINE

## 2023-03-10 PROCEDURE — 1159F MED LIST DOCD IN RCRD: CPT | Mod: CPTII,S$GLB,, | Performed by: INTERNAL MEDICINE

## 2023-03-10 PROCEDURE — 99214 PR OFFICE/OUTPT VISIT, EST, LEVL IV, 30-39 MIN: ICD-10-PCS | Mod: S$GLB,,, | Performed by: INTERNAL MEDICINE

## 2023-03-10 PROCEDURE — 3008F PR BODY MASS INDEX (BMI) DOCUMENTED: ICD-10-PCS | Mod: CPTII,S$GLB,, | Performed by: INTERNAL MEDICINE

## 2023-03-10 PROCEDURE — 3074F PR MOST RECENT SYSTOLIC BLOOD PRESSURE < 130 MM HG: ICD-10-PCS | Mod: CPTII,S$GLB,, | Performed by: INTERNAL MEDICINE

## 2023-03-10 PROCEDURE — 3074F SYST BP LT 130 MM HG: CPT | Mod: CPTII,S$GLB,, | Performed by: INTERNAL MEDICINE

## 2023-03-10 PROCEDURE — 1159F PR MEDICATION LIST DOCUMENTED IN MEDICAL RECORD: ICD-10-PCS | Mod: CPTII,S$GLB,, | Performed by: INTERNAL MEDICINE

## 2023-03-10 PROCEDURE — 99999 PR PBB SHADOW E&M-EST. PATIENT-LVL IV: ICD-10-PCS | Mod: PBBFAC,,, | Performed by: INTERNAL MEDICINE

## 2023-03-10 PROCEDURE — 99214 OFFICE O/P EST MOD 30 MIN: CPT | Mod: S$GLB,,, | Performed by: INTERNAL MEDICINE

## 2023-03-10 PROCEDURE — 99999 PR PBB SHADOW E&M-EST. PATIENT-LVL IV: CPT | Mod: PBBFAC,,, | Performed by: INTERNAL MEDICINE

## 2023-03-10 PROCEDURE — 3078F PR MOST RECENT DIASTOLIC BLOOD PRESSURE < 80 MM HG: ICD-10-PCS | Mod: CPTII,S$GLB,, | Performed by: INTERNAL MEDICINE

## 2023-03-10 PROCEDURE — 3008F BODY MASS INDEX DOCD: CPT | Mod: CPTII,S$GLB,, | Performed by: INTERNAL MEDICINE

## 2023-03-10 RX ORDER — FLUTICASONE PROPIONATE 50 MCG
1 SPRAY, SUSPENSION (ML) NASAL DAILY
Qty: 16 G | Refills: 0 | Status: SHIPPED | OUTPATIENT
Start: 2023-03-10 | End: 2023-04-12

## 2023-03-10 NOTE — PATIENT INSTRUCTIONS
Dr. Kurt A. Lejeune, DDS  4.9  28 [employer] reviews  Dentist in [place], [place]  Address: [facility] [id number], [place] [address]  Hours:   Closed ? Opens 8?AM Mon  Phone: [phone]  Suggest an edit · Own this business?

## 2023-03-10 NOTE — ASSESSMENT & PLAN NOTE
[place]ton score 5  Usage poor: pressure intolerance  Usage > 4 hrs was 0%    Option: repeat test or adhere with CPAP or see DDS for mandibular device

## 2023-03-10 NOTE — PROGRESS NOTES
Pulmonary Outpatient  Visit     Subjective:       Patient ID: Olesya Tay is a 48 y.o. female.    Chief Complaint: Apnea and Pulmonary Nodules        Olesya Tay is 47 y.o.  Last seen 03/29/2022  SIENA on CPAP 5-15  Usage > 4 hrs dropped   Used only 3 days  Last visit was using daily  Pressure and discomfort issues  Asked about MAD  Will change to fixed CPAP  Last ct chest nodule 5 mm   Needed f/u: missed f/u during COVID  Normal PFT in past      03/10/2023  Last seen  Follow to review imaging  Chest CT 12/2022; 3 mm and 5 mm nodule  Imaging since 2013, 2015, 2017  On Plaquenil;  Chronic fatigue and poor memory  CPAP adherence was poor  Stress test reviewed: During stress, the following significant arrhythmias were observed: occasional PVCs  Poor memory  Feels weight loss may negate need for CPAP  Offered repeat test  Wants to try CPAP again          The following portions of the patient's history were reviewed and updated as appropriate:   She  has a past medical history of Constipation; Depression; Endometriosis of pelvis; GERD (gastroesophageal reflux disease); Gestational diabetes mellitus in pregnancy; Lung nodule; and Sjogren's syndrome.  She  does not have any pertinent problems on file.  She  has no past surgical history on file.  Her family history includes Arthritis in her maternal aunt; Cancer in her paternal grandmother; Early death (age of onset: 52) in her mother; Heart disease in her maternal grandfather.  She  reports that she has never smoked. She has never used smokeless tobacco. She reports that she drinks alcohol. She reports that she does not use illicit drugs.  She has a current medication list which includes the following prescription(s): esomeprazole magnesium, fish oil-omega-3 fatty acids, hydroxychloroquine, and vitamin e.      The following portions of the patient's history were reviewed and updated as appropriate: She  has a past  medical history of Constipation, Depression, Endometriosis of pelvis, GERD (gastroesophageal reflux disease), Gestational diabetes mellitus in pregnancy, Lung nodule, and Sjogren's syndrome.  She does not have any pertinent problems on file.  She  has no past surgical history on file.  Her family history includes Arthritis in her maternal aunt; Breast cancer in her paternal aunt; Cancer in her paternal grandmother; Early death (age of onset: 52) in her mother; Heart disease in her maternal grandfather.  She  reports that she has never smoked. She has never used smokeless tobacco. She reports current alcohol use. She reports that she does not use drugs.  She has a current medication list which includes the following prescription(s): hydroxychloroquine, nystatin-triamcinolone, pantoprazole, ozempic, sertraline, epinephrine, fluticasone propionate, and metronidazole.  Current Outpatient Medications on File Prior to Visit   Medication Sig Dispense Refill    hydrOXYchloroQUINE (PLAQUENIL) 200 mg tablet Take 1 tablet (200 mg total) by mouth 2 (two) times daily. 180 tablet 3    nystatin-triamcinolone (MYCOLOG II) cream Apply to affected area 2 times daily 30 g 3    pantoprazole (PROTONIX) 40 MG tablet Take 1 tablet (40 mg total) by mouth once daily. 30 tablet 11    semaglutide (OZEMPIC) 0.25 mg or 0.5 mg(2 mg/1.5 mL) pen injector Inject 0.25 mg into the skin every 7 days. 1 pen 5    sertraline (ZOLOFT) 25 MG tablet Take 1 tablet (25 mg total) by mouth once daily. 30 tablet 3    EPINEPHrine (EPIPEN) 0.3 mg/0.3 mL AtIn Inject 0.3 mLs (0.3 mg total) into the muscle once. If symptoms not improved repeat in about 5-15 minutes - inject a second dose (pen) of 0.3 mL into muscle once. for 1 dose 2 each 2    metroNIDAZOLE (METROGEL) 0.75 % gel AAA bid. For redness 45 g 3     No current facility-administered medications on file prior to visit.     She is allergic to doxycycline, shrimp, prednisone, and pcn [penicillins]..     Review  "of Systems   Respiratory: Negative.     All other systems reviewed and are negative.    Outpatient Encounter Medications as of 3/10/2023   Medication Sig Dispense Refill    hydrOXYchloroQUINE (PLAQUENIL) 200 mg tablet Take 1 tablet (200 mg total) by mouth 2 (two) times daily. 180 tablet 3    nystatin-triamcinolone (MYCOLOG II) cream Apply to affected area 2 times daily 30 g 3    pantoprazole (PROTONIX) 40 MG tablet Take 1 tablet (40 mg total) by mouth once daily. 30 tablet 11    semaglutide (OZEMPIC) 0.25 mg or 0.5 mg(2 mg/1.5 mL) pen injector Inject 0.25 mg into the skin every 7 days. 1 pen 5    sertraline (ZOLOFT) 25 MG tablet Take 1 tablet (25 mg total) by mouth once daily. 30 tablet 3    EPINEPHrine (EPIPEN) 0.3 mg/0.3 mL AtIn Inject 0.3 mLs (0.3 mg total) into the muscle once. If symptoms not improved repeat in about 5-15 minutes - inject a second dose (pen) of 0.3 mL into muscle once. for 1 dose 2 each 2    fluticasone propionate (FLONASE) 50 mcg/actuation nasal spray 1 spray (50 mcg total) by Each Nostril route once daily. 16 g 0    metroNIDAZOLE (METROGEL) 0.75 % gel AAA bid. For redness 45 g 3     No facility-administered encounter medications on file as of 3/10/2023.       Objective:     Vital Signs (Most Recent)  Vital Signs  Pulse: 73  Resp: 18  SpO2: 97 %  BP: 122/78  Height and Weight  Height: 5' 7" (170.2 cm)  Weight: 90.6 kg (199 lb 11.8 oz)  BSA (Calculated - sq m): 2.07 sq meters  BMI (Calculated): 31.3  Weight in (lb) to have BMI = 25: 159.3]  Wt Readings from Last 2 Encounters:   03/10/23 90.6 kg (199 lb 11.8 oz)   12/30/22 94.8 kg (209 lb)       Physical Exam   Constitutional: She is oriented to person, place, and time. She appears well-developed and well-nourished.   HENT:   Head: Normocephalic.   Mouth/Throat: Mallampati Score: II.   Neck: No JVD present.   Cardiovascular: Normal rate and intact distal pulses.   No murmur heard.  Pulmonary/Chest: Normal expansion, effort normal and breath " sounds normal.   Abdominal: Soft. Bowel sounds are normal.   Musculoskeletal:         General: No edema. Normal range of motion.      Cervical back: Normal range of motion and neck supple.   Lymphadenopathy:     She has no axillary adenopathy.   Neurological: She is alert and oriented to person, place, and time.   Skin: Skin is warm and dry.   Psychiatric: She has a normal mood and affect.   Nursing note and vitals reviewed.    Laboratory  Lab Results   Component Value Date    WBC 5.64 10/25/2022    RBC 4.34 10/25/2022    HGB 13.0 10/25/2022    HCT 40.0 10/25/2022    MCV 92 10/25/2022    MCH 30.0 10/25/2022    MCHC 32.5 10/25/2022    RDW 12.6 10/25/2022     10/25/2022    MPV 8.6 (L) 10/25/2022    GRAN 4.0 10/25/2022    GRAN 70.9 10/25/2022    LYMPH 1.1 10/25/2022    LYMPH 19.0 10/25/2022    MONO 0.4 10/25/2022    MONO 7.6 10/25/2022    EOS 0.1 10/25/2022    BASO 0.04 10/25/2022    EOSINOPHIL 1.6 10/25/2022    BASOPHIL 0.7 10/25/2022       BMP  Lab Results   Component Value Date     (L) 10/25/2022    K 3.9 10/25/2022     10/25/2022    CO2 27 10/25/2022    BUN 10 10/25/2022    CREATININE 0.7 10/25/2022    CALCIUM 9.3 10/25/2022    ANIONGAP 7 (L) 10/25/2022    ESTGFRAFRICA >60 04/19/2022    EGFRNONAA >60 04/19/2022    AST 18 10/25/2022    ALT 15 10/25/2022    PROT 7.6 10/25/2022       Lab Results   Component Value Date    BNP 49 07/31/2015       Lab Results   Component Value Date    TSH 2.706 10/25/2022       Lab Results   Component Value Date    SEDRATE 33 04/19/2022       Lab Results   Component Value Date    CRP 4.2 04/19/2022     Lab Results   Component Value Date    IGE <35 10/25/2022    IGE <35 02/26/2021        Lab Results   Component Value Date    ASPERGILLUS <0.10 10/25/2022     Lab Results   Component Value Date    AFUMIGATUSCL CLASS 0 10/25/2022        No results found for: ACE     Diagnostic Results:  I have personally reviewed today the following studies:    Stress Echo Which stress  agent will be used? Treadmill Exercise; Color Flow Doppler? No  · The test was stopped because the patient experienced fatigue and   shortness of breath. The patient reached the end of the protocol. The   patient requested the test to be stopped.  · The left ventricle is normal in size with normal systolic function.  · The estimated ejection fraction is 65%.  · Indeterminate left ventricular diastolic function.  · Normal right ventricular size with normal right ventricular systolic   function.  · Mild tricuspid regurgitation.  · Normal central venous pressure (3 mmHg).  · The estimated PA systolic pressure is 27 mmHg.  · The stress echo portion of this study is negative for myocardial   ischemia.  · The patient's exercise capacity was normal.  · During stress, the following significant arrhythmias were observed:   occasional PVCs.  · The ECG portion of this study is negative for myocardial ischemia.      EXAMINATION:  CT CHEST WITHOUT CONTRAST     CLINICAL HISTORY:  follow up nodule; Other nonspecific abnormal finding of lung field     TECHNIQUE:  Low dose axial images, sagittal and coronal reformations were obtained from the thoracic inlet to the lung bases. Contrast was not administered.     COMPARISON:  11/30/2015     FINDINGS:  Base of Neck: No significant abnormality.     Thoracic soft tissues: Normal.     Aorta: Left-sided aortic arch.  No aneurysm and no significant atherosclerosis     Heart: Normal size. No effusion.     Pulmonary vasculature: Pulmonary arteries distribute normally.  There are four pulmonary veins.     Shahla/Mediastinum: No pathologic clementine enlargement.     Airways: Patent.     Lungs/Pleura: Multiple bilateral noncalcified nodularities are present measuring up to 5 mm in the peripheral aspect the left lower lobe (series 4, image 361) with 3 mm nodules also present (series 4, image 201) in the right and left lower lobes.  These lesions are slightly more dense in the interval, however prior  study was 7 years prior.             Assessment/Plan:     Problem List Items Addressed This Visit       Sjogren's syndrome with keratoconjunctivitis sicca     On Plaquenil         Multiple pulmonary nodules     3mm and 5 mm nodule  Follow in 12 months         Chronic fatigue     stable         Obesity (BMI 30.0-34.9)     General weight loss/lifestyle modification strategies discussed (elicit support from others; identify saboteurs; non-food rewards).  Diet interventions: low calorie (1000 kCal/d) deficit diet          SIENA on CPAP - Primary     [place] score 5  Usage poor: pressure intolerance  Usage > 4 hrs was 0%    Option: repeat test or adhere with CPAP or see DDS for mandibular device         Relevant Medications    fluticasone propionate (FLONASE) 50 mcg/actuation nasal spray          Follow up in about 9 months (around 12/10/2023), or adherence with CPAP ,weight, chest CT.    This note was prepared using voice recognition system and is likely to have sound alike errors that may have been overlooked even after proof reading.  Please call me with any questions    Discussed diagnosis, its evaluation, treatment and usual course. All questions answered.      Thom Velazquez MD

## 2023-03-15 ENCOUNTER — TELEPHONE (OUTPATIENT)
Dept: CARDIOLOGY | Facility: CLINIC | Age: 48
End: 2023-03-15
Payer: COMMERCIAL

## 2023-03-15 NOTE — TELEPHONE ENCOUNTER
Called patient and let her know she had the earliest appointment on 04/05/2023. Patient rescheduled appointment at the [facility] on 04/10/2023 @ 1:20. Patient accepted appointment with no questions or concerns.       ----- Message from Korina Artis sent at 3/15/2023  8:36 AM CDT -----  Pot would like to know if there is an earlier time slot on 04/05/2023. Please call the pt back at [phone] to advise. Anjana. EL

## 2023-03-28 ENCOUNTER — OFFICE VISIT (OUTPATIENT)
Dept: INTERNAL MEDICINE | Facility: CLINIC | Age: 48
End: 2023-03-28
Payer: COMMERCIAL

## 2023-03-28 VITALS
BODY MASS INDEX: 31.11 KG/M2 | RESPIRATION RATE: 20 BRPM | SYSTOLIC BLOOD PRESSURE: 120 MMHG | OXYGEN SATURATION: 97 % | HEART RATE: 103 BPM | DIASTOLIC BLOOD PRESSURE: 76 MMHG | WEIGHT: 198.19 LBS | HEIGHT: 67 IN

## 2023-03-28 DIAGNOSIS — G47.33 OSA ON CPAP: ICD-10-CM

## 2023-03-28 DIAGNOSIS — L71.9 ROSACEA: ICD-10-CM

## 2023-03-28 DIAGNOSIS — Z86.32 HISTORY OF GESTATIONAL DIABETES: ICD-10-CM

## 2023-03-28 DIAGNOSIS — E66.9 OBESITY (BMI 30.0-34.9): ICD-10-CM

## 2023-03-28 DIAGNOSIS — R91.8 MULTIPLE PULMONARY NODULES: ICD-10-CM

## 2023-03-28 DIAGNOSIS — M35.01 SJOGREN'S SYNDROME WITH KERATOCONJUNCTIVITIS SICCA: ICD-10-CM

## 2023-03-28 DIAGNOSIS — R53.82 CHRONIC FATIGUE: Primary | ICD-10-CM

## 2023-03-28 DIAGNOSIS — K21.9 GASTROESOPHAGEAL REFLUX DISEASE, UNSPECIFIED WHETHER ESOPHAGITIS PRESENT: ICD-10-CM

## 2023-03-28 DIAGNOSIS — R76.8 RHEUMATOID FACTOR POSITIVE: ICD-10-CM

## 2023-03-28 PROCEDURE — 84443 ASSAY THYROID STIM HORMONE: CPT | Performed by: FAMILY MEDICINE

## 2023-03-28 PROCEDURE — 3074F SYST BP LT 130 MM HG: CPT | Mod: CPTII,S$GLB,, | Performed by: FAMILY MEDICINE

## 2023-03-28 PROCEDURE — 99214 OFFICE O/P EST MOD 30 MIN: CPT | Mod: S$GLB,,, | Performed by: FAMILY MEDICINE

## 2023-03-28 PROCEDURE — 99999 PR PBB SHADOW E&M-EST. PATIENT-LVL IV: CPT | Mod: PBBFAC,,, | Performed by: FAMILY MEDICINE

## 2023-03-28 PROCEDURE — 1159F MED LIST DOCD IN RCRD: CPT | Mod: CPTII,S$GLB,, | Performed by: FAMILY MEDICINE

## 2023-03-28 PROCEDURE — 3008F BODY MASS INDEX DOCD: CPT | Mod: CPTII,S$GLB,, | Performed by: FAMILY MEDICINE

## 2023-03-28 PROCEDURE — 3008F PR BODY MASS INDEX (BMI) DOCUMENTED: ICD-10-PCS | Mod: CPTII,S$GLB,, | Performed by: FAMILY MEDICINE

## 2023-03-28 PROCEDURE — 1159F PR MEDICATION LIST DOCUMENTED IN MEDICAL RECORD: ICD-10-PCS | Mod: CPTII,S$GLB,, | Performed by: FAMILY MEDICINE

## 2023-03-28 PROCEDURE — 3078F DIAST BP <80 MM HG: CPT | Mod: CPTII,S$GLB,, | Performed by: FAMILY MEDICINE

## 2023-03-28 PROCEDURE — 99999 PR PBB SHADOW E&M-EST. PATIENT-LVL IV: ICD-10-PCS | Mod: PBBFAC,,, | Performed by: FAMILY MEDICINE

## 2023-03-28 PROCEDURE — 1160F RVW MEDS BY RX/DR IN RCRD: CPT | Mod: CPTII,S$GLB,, | Performed by: FAMILY MEDICINE

## 2023-03-28 PROCEDURE — 80053 COMPREHEN METABOLIC PANEL: CPT | Performed by: FAMILY MEDICINE

## 2023-03-28 PROCEDURE — 99214 PR OFFICE/OUTPT VISIT, EST, LEVL IV, 30-39 MIN: ICD-10-PCS | Mod: S$GLB,,, | Performed by: FAMILY MEDICINE

## 2023-03-28 PROCEDURE — 3078F PR MOST RECENT DIASTOLIC BLOOD PRESSURE < 80 MM HG: ICD-10-PCS | Mod: CPTII,S$GLB,, | Performed by: FAMILY MEDICINE

## 2023-03-28 PROCEDURE — 1160F PR REVIEW ALL MEDS BY PRESCRIBER/CLIN PHARMACIST DOCUMENTED: ICD-10-PCS | Mod: CPTII,S$GLB,, | Performed by: FAMILY MEDICINE

## 2023-03-28 PROCEDURE — 3074F PR MOST RECENT SYSTOLIC BLOOD PRESSURE < 130 MM HG: ICD-10-PCS | Mod: CPTII,S$GLB,, | Performed by: FAMILY MEDICINE

## 2023-03-28 RX ORDER — ESOMEPRAZOLE MAGNESIUM 40 MG/1
40 CAPSULE, DELAYED RELEASE ORAL
Qty: 30 CAPSULE | Refills: 11 | Status: SHIPPED | OUTPATIENT
Start: 2023-03-28 | End: 2023-06-12 | Stop reason: SDUPTHER

## 2023-03-28 RX ORDER — SEMAGLUTIDE 1.34 MG/ML
1 INJECTION, SOLUTION SUBCUTANEOUS
Qty: 9.64 ML | Refills: 3 | Status: SHIPPED | OUTPATIENT
Start: 2023-03-28 | End: 2023-06-19 | Stop reason: SDUPTHER

## 2023-03-29 LAB
ALBUMIN SERPL BCP-MCNC: 4.1 G/DL (ref 3.5–5.2)
ALP SERPL-CCNC: 85 U/L (ref 55–135)
ALT SERPL W/O P-5'-P-CCNC: 21 U/L (ref 10–44)
ANION GAP SERPL CALC-SCNC: 13 MMOL/L (ref 8–16)
AST SERPL-CCNC: 22 U/L (ref 10–40)
BILIRUB SERPL-MCNC: 0.3 MG/DL (ref 0.1–1)
BUN SERPL-MCNC: 11 MG/DL (ref 6–20)
CALCIUM SERPL-MCNC: 10 MG/DL (ref 8.7–10.5)
CHLORIDE SERPL-SCNC: 104 MMOL/L (ref 95–110)
CO2 SERPL-SCNC: 24 MMOL/L (ref 23–29)
CREAT SERPL-MCNC: 0.8 MG/DL (ref 0.5–1.4)
EST. GFR  (NO RACE VARIABLE): >60 ML/MIN/1.73 M^2
GLUCOSE SERPL-MCNC: 71 MG/DL (ref 70–110)
POTASSIUM SERPL-SCNC: 3.8 MMOL/L (ref 3.5–5.1)
PROT SERPL-MCNC: 8.1 G/DL (ref 6–8.4)
SODIUM SERPL-SCNC: 141 MMOL/L (ref 136–145)
TSH SERPL DL<=0.005 MIU/L-ACNC: 1.98 UIU/ML (ref 0.4–4)

## 2023-03-30 NOTE — PROGRESS NOTES
"Subjective:       Patient ID: Olesya Tay is a 48 y.o. female.    Chief Complaint: Follow-up    48-year-old female patient with Patient Active Problem List:     Sjogren's syndrome with keratoconjunctivitis sicca     Rheumatoid factor positive     Multiple pulmonary nodules     Rosacea     Encounter for long-term current use of high risk medication     Xerostomia due to autoimmune disease     Chronic fatigue     History of gestational diabetes     Hair loss     Obesity (BMI 30.0-34.9)     SIENA on CPAP  Here for follow-up on chronic medical conditions  Continues to have chronic fatigue and has been on Ozempic 1 mg for the past 1 week  Denies any chest pain or difficulty breathing with palpitations  Would like to get back on Nexium 40 mg and requesting refill today for underlying acid reflex    Review of Systems   Constitutional:  Positive for fatigue.       /76 (BP Location: Right arm, Patient Position: Sitting, BP Method: Large (Manual))   Pulse 103   Resp 20   Ht 5' 7" (1.702 m)   Wt 89.9 kg (198 lb 3.1 oz)   SpO2 97%   BMI 31.04 kg/m²   Objective:      Physical Exam  Constitutional:       Appearance: She is well-developed.   HENT:      Head: Normocephalic and atraumatic.   Cardiovascular:      Rate and Rhythm: Normal rate and regular rhythm.      Heart sounds: Normal heart sounds. No murmur heard.  Pulmonary:      Effort: Pulmonary effort is normal.      Breath sounds: Normal breath sounds. No wheezing.   Abdominal:      General: Bowel sounds are normal.      Palpations: Abdomen is soft.      Tenderness: There is no abdominal tenderness.   Skin:     General: Skin is warm and dry.      Findings: No rash.   Neurological:      Mental Status: She is alert and oriented to person, place, and time.   Psychiatric:         Mood and Affect: Mood normal.         Assessment/Plan:   1. Chronic fatigue  -     Comprehensive Metabolic Panel; Future; Expected date: 03/28/2023  -     TSH; Future; Expected date: " 03/28/2023  Encouraged to eat iron and protein rich diet Will check further labs    2. Rheumatoid factor positive  Overview:  CCP + 6.3 (low)  RF 21.0- (low)    3. Sjogren's syndrome with keratoconjunctivitis sicca    To be followed by Rheumatology currently on Plaquenil    4. Multiple pulmonary nodules  Stable and asymptomatic    5. History of gestational diabetes    6. SIENA on CPAP  Stable    7. Obesity (BMI 30.0-34.9)  -     semaglutide (OZEMPIC) 1 mg/dose (2 mg/1.5 mL) PnIj; Inject 1 mg into the skin every 7 days.  Dispense: 9.64 mL; Refill: 3  Refill will be given on Ozempic 1 mg weekly for 3 months    8. Gastroesophageal reflux disease, unspecified whether esophagitis present  -     esomeprazole (NEXIUM) 40 MG capsule; Take 1 capsule (40 mg total) by mouth before breakfast.  Dispense: 30 capsule; Refill: 11  Will get started on Nexium 40 mg daily    9. Rosacea  Stable on Metrogel

## 2023-04-25 ENCOUNTER — OFFICE VISIT (OUTPATIENT)
Dept: RHEUMATOLOGY | Facility: CLINIC | Age: 48
End: 2023-04-25
Payer: COMMERCIAL

## 2023-04-25 ENCOUNTER — LAB VISIT (OUTPATIENT)
Dept: LAB | Facility: HOSPITAL | Age: 48
End: 2023-04-25
Attending: STUDENT IN AN ORGANIZED HEALTH CARE EDUCATION/TRAINING PROGRAM
Payer: COMMERCIAL

## 2023-04-25 VITALS
BODY MASS INDEX: 30.97 KG/M2 | HEIGHT: 67 IN | WEIGHT: 197.31 LBS | DIASTOLIC BLOOD PRESSURE: 85 MMHG | HEART RATE: 80 BPM | SYSTOLIC BLOOD PRESSURE: 146 MMHG

## 2023-04-25 DIAGNOSIS — M35.00 SJOGREN'S DISEASE: ICD-10-CM

## 2023-04-25 DIAGNOSIS — Z79.899 LONG-TERM USE OF PLAQUENIL: ICD-10-CM

## 2023-04-25 DIAGNOSIS — Z79.899 ENCOUNTER FOR LONG-TERM CURRENT USE OF HIGH RISK MEDICATION: ICD-10-CM

## 2023-04-25 DIAGNOSIS — M35.01 SJOGREN'S SYNDROME WITH KERATOCONJUNCTIVITIS SICCA: ICD-10-CM

## 2023-04-25 DIAGNOSIS — M35.01 SJOGREN'S SYNDROME WITH KERATOCONJUNCTIVITIS SICCA: Primary | ICD-10-CM

## 2023-04-25 LAB
BILIRUB UR QL STRIP: NEGATIVE
CLARITY UR: CLEAR
COLOR UR: YELLOW
CRP SERPL-MCNC: 5.1 MG/L (ref 0–8.2)
ERYTHROCYTE [SEDIMENTATION RATE] IN BLOOD BY PHOTOMETRIC METHOD: 20 MM/HR (ref 0–36)
GLUCOSE UR QL STRIP: NEGATIVE
HGB UR QL STRIP: NEGATIVE
KETONES UR QL STRIP: NEGATIVE
LEUKOCYTE ESTERASE UR QL STRIP: NEGATIVE
NITRITE UR QL STRIP: NEGATIVE
PH UR STRIP: 6 [PH] (ref 5–8)
PROT UR QL STRIP: NEGATIVE
SP GR UR STRIP: 1.01 (ref 1–1.03)
URN SPEC COLLECT METH UR: NORMAL

## 2023-04-25 PROCEDURE — 3077F PR MOST RECENT SYSTOLIC BLOOD PRESSURE >= 140 MM HG: ICD-10-PCS | Mod: CPTII,S$GLB,, | Performed by: STUDENT IN AN ORGANIZED HEALTH CARE EDUCATION/TRAINING PROGRAM

## 2023-04-25 PROCEDURE — 99999 PR PBB SHADOW E&M-EST. PATIENT-LVL IV: ICD-10-PCS | Mod: PBBFAC,,, | Performed by: STUDENT IN AN ORGANIZED HEALTH CARE EDUCATION/TRAINING PROGRAM

## 2023-04-25 PROCEDURE — 1160F PR REVIEW ALL MEDS BY PRESCRIBER/CLIN PHARMACIST DOCUMENTED: ICD-10-PCS | Mod: CPTII,S$GLB,, | Performed by: STUDENT IN AN ORGANIZED HEALTH CARE EDUCATION/TRAINING PROGRAM

## 2023-04-25 PROCEDURE — 86160 COMPLEMENT ANTIGEN: CPT | Mod: 59 | Performed by: STUDENT IN AN ORGANIZED HEALTH CARE EDUCATION/TRAINING PROGRAM

## 2023-04-25 PROCEDURE — 85652 RBC SED RATE AUTOMATED: CPT | Performed by: STUDENT IN AN ORGANIZED HEALTH CARE EDUCATION/TRAINING PROGRAM

## 2023-04-25 PROCEDURE — 86160 COMPLEMENT ANTIGEN: CPT | Performed by: STUDENT IN AN ORGANIZED HEALTH CARE EDUCATION/TRAINING PROGRAM

## 2023-04-25 PROCEDURE — 86140 C-REACTIVE PROTEIN: CPT | Performed by: STUDENT IN AN ORGANIZED HEALTH CARE EDUCATION/TRAINING PROGRAM

## 2023-04-25 PROCEDURE — 3079F DIAST BP 80-89 MM HG: CPT | Mod: CPTII,S$GLB,, | Performed by: STUDENT IN AN ORGANIZED HEALTH CARE EDUCATION/TRAINING PROGRAM

## 2023-04-25 PROCEDURE — 99999 PR PBB SHADOW E&M-EST. PATIENT-LVL IV: CPT | Mod: PBBFAC,,, | Performed by: STUDENT IN AN ORGANIZED HEALTH CARE EDUCATION/TRAINING PROGRAM

## 2023-04-25 PROCEDURE — 3008F BODY MASS INDEX DOCD: CPT | Mod: CPTII,S$GLB,, | Performed by: STUDENT IN AN ORGANIZED HEALTH CARE EDUCATION/TRAINING PROGRAM

## 2023-04-25 PROCEDURE — 3079F PR MOST RECENT DIASTOLIC BLOOD PRESSURE 80-89 MM HG: ICD-10-PCS | Mod: CPTII,S$GLB,, | Performed by: STUDENT IN AN ORGANIZED HEALTH CARE EDUCATION/TRAINING PROGRAM

## 2023-04-25 PROCEDURE — 3077F SYST BP >= 140 MM HG: CPT | Mod: CPTII,S$GLB,, | Performed by: STUDENT IN AN ORGANIZED HEALTH CARE EDUCATION/TRAINING PROGRAM

## 2023-04-25 PROCEDURE — 99214 PR OFFICE/OUTPT VISIT, EST, LEVL IV, 30-39 MIN: ICD-10-PCS | Mod: S$GLB,,, | Performed by: STUDENT IN AN ORGANIZED HEALTH CARE EDUCATION/TRAINING PROGRAM

## 2023-04-25 PROCEDURE — 1159F PR MEDICATION LIST DOCUMENTED IN MEDICAL RECORD: ICD-10-PCS | Mod: CPTII,S$GLB,, | Performed by: STUDENT IN AN ORGANIZED HEALTH CARE EDUCATION/TRAINING PROGRAM

## 2023-04-25 PROCEDURE — 99214 OFFICE O/P EST MOD 30 MIN: CPT | Mod: S$GLB,,, | Performed by: STUDENT IN AN ORGANIZED HEALTH CARE EDUCATION/TRAINING PROGRAM

## 2023-04-25 PROCEDURE — 84156 ASSAY OF PROTEIN URINE: CPT | Performed by: STUDENT IN AN ORGANIZED HEALTH CARE EDUCATION/TRAINING PROGRAM

## 2023-04-25 PROCEDURE — 3008F PR BODY MASS INDEX (BMI) DOCUMENTED: ICD-10-PCS | Mod: CPTII,S$GLB,, | Performed by: STUDENT IN AN ORGANIZED HEALTH CARE EDUCATION/TRAINING PROGRAM

## 2023-04-25 PROCEDURE — 1160F RVW MEDS BY RX/DR IN RCRD: CPT | Mod: CPTII,S$GLB,, | Performed by: STUDENT IN AN ORGANIZED HEALTH CARE EDUCATION/TRAINING PROGRAM

## 2023-04-25 PROCEDURE — 1159F MED LIST DOCD IN RCRD: CPT | Mod: CPTII,S$GLB,, | Performed by: STUDENT IN AN ORGANIZED HEALTH CARE EDUCATION/TRAINING PROGRAM

## 2023-04-25 PROCEDURE — 81003 URINALYSIS AUTO W/O SCOPE: CPT | Performed by: STUDENT IN AN ORGANIZED HEALTH CARE EDUCATION/TRAINING PROGRAM

## 2023-04-25 PROCEDURE — 36415 COLL VENOUS BLD VENIPUNCTURE: CPT | Performed by: STUDENT IN AN ORGANIZED HEALTH CARE EDUCATION/TRAINING PROGRAM

## 2023-04-25 RX ORDER — HYDROXYCHLOROQUINE SULFATE 200 MG/1
200 TABLET, FILM COATED ORAL DAILY
Qty: 90 TABLET | Refills: 3 | Status: SHIPPED | OUTPATIENT
Start: 2023-04-25

## 2023-04-26 LAB
C3 SERPL-MCNC: 136 MG/DL (ref 50–180)
C4 SERPL-MCNC: 23 MG/DL (ref 11–44)
CREAT UR-MCNC: 63 MG/DL (ref 15–325)
PROT UR-MCNC: <7 MG/DL (ref 0–15)
PROT/CREAT UR: NORMAL MG/G{CREAT} (ref 0–0.2)

## 2023-04-26 NOTE — PROGRESS NOTES
RHEUMATOLOGY CLINIC established patient VISIT    Reason for consult:- Sjogren's    Chief complaints, HPI, ROS, EXAM, Assessment & Plans:-    Olesya Tay is a 48 y.o. pleasant female who presents to follow-up for chronic Sjogren's syndrome with a positive SEAMUS, positive SSA, positive SSB, positive rheumatoid factor and dryness.  She has also had mildly positive CCP antibody in the past but no evidence of active rheumatoid arthritis.  Her last visit with us was April 26, 2022.  She has been taking her hydroxychloroquine daily which seems to have helped with her symptoms.  States she continues to have poor sleep in a lot of fatigue/brain fog.  Has been getting eye exams with optometrist but seems not doing OCT exam.  States she is thinking about filing for disability in the near future.  Rheumatologic review of systems otherwise negative.No evidence of synovitis, dactylitis or enthesitis.    Reviewed all available old and outside pertinent medical records.    All lab results personally reviewed and interpreted by me.    1. Sjogren's syndrome with keratoconjunctivitis sicca    2. Long-term use of Plaquenil    3. Encounter for long-term current use of high risk medication    4. Sjogren's disease        Problem List Items Addressed This Visit          Ophtho    Sjogren's syndrome with keratoconjunctivitis sicca - Primary    Relevant Medications    hydrOXYchloroQUINE (PLAQUENIL) 200 mg tablet    Other Relevant Orders    C3 Complement (Completed)    C4 Complement (Completed)    C-Reactive Protein (Completed)    Sedimentation rate (Completed)    Protein/Creatinine Ratio, Urine (Completed)    Urinalysis (Completed)       Palliative Care    Encounter for long-term current use of high risk medication    Relevant Orders    C3 Complement (Completed)    C4 Complement (Completed)    C-Reactive Protein (Completed)    Sedimentation rate (Completed)    Protein/Creatinine Ratio, Urine (Completed)    Urinalysis (Completed)      Other Visit Diagnoses       Long-term use of Plaquenil        Relevant Orders    Ambulatory referral/consult to Ophthalmology    C3 Complement (Completed)    C4 Complement (Completed)    C-Reactive Protein (Completed)    Sedimentation rate (Completed)    Protein/Creatinine Ratio, Urine (Completed)    Urinalysis (Completed)    Sjogren's disease        Relevant Medications    hydrOXYchloroQUINE (PLAQUENIL) 200 mg tablet    Other Relevant Orders    C3 Complement (Completed)    C4 Complement (Completed)    C-Reactive Protein (Completed)    Sedimentation rate (Completed)    Protein/Creatinine Ratio, Urine (Completed)    Urinalysis (Completed)            Continue with hydroxychloroquine daily  Referral to Ophthalmology for full Plaquenil eye exams  We will update labs today including C3, C4, ESR/CRP, UA/U PCR  Recommend following Mediterranean diet    # Follow up in about 1 year (around 4/25/2024).    Chronic comorbid conditions affecting medical decision making today:    Past Medical History:   Diagnosis Date    Constipation     Depression     Endometriosis of pelvis     GERD (gastroesophageal reflux disease)     Gestational diabetes mellitus in pregnancy     Lung nodule     Sjogren's syndrome        History reviewed. No pertinent surgical history.     Social History     Tobacco Use    Smoking status: Never    Smokeless tobacco: Never   Substance Use Topics    Alcohol use: Yes     Alcohol/week: 6.0 standard drinks     Types: 6 Glasses of wine per week     Comment: socially    Drug use: Never       Family History   Problem Relation Age of Onset    Early death Mother         hepatitis c    Heart disease Maternal Grandfather     Cancer Paternal Grandmother         stomach    Arthritis Maternal Aunt         rheumatoid    Breast cancer Paternal Aunt     Stroke Paternal Grandfather        Review of patient's allergies indicates:   Allergen Reactions    Doxycycline Shortness Of Breath     Elevated BP and shortness of breath     Shrimp Swelling    Prednisone Hives     Hives after taking medrol dose pack    Pcn [penicillins] Rash       Medication List with Changes/Refills   Current Medications    EPINEPHRINE (EPIPEN) 0.3 MG/0.3 ML ATIN    Inject 0.3 mLs (0.3 mg total) into the muscle once. If symptoms not improved repeat in about 5-15 minutes - inject a second dose (pen) of 0.3 mL into muscle once. for 1 dose    ESOMEPRAZOLE (NEXIUM) 40 MG CAPSULE    Take 1 capsule (40 mg total) by mouth before breakfast.    FLUTICASONE PROPIONATE (FLONASE) 50 MCG/ACTUATION NASAL SPRAY    1 SPRAY BY EACH NOSTRIL ONCE DAILY    METRONIDAZOLE (METROGEL) 0.75 % GEL    AAA bid. For redness    NYSTATIN-TRIAMCINOLONE (MYCOLOG II) CREAM    Apply to affected area 2 times daily    SEMAGLUTIDE (OZEMPIC) 1 MG/DOSE (2 MG/1.5 ML) PNIJ    Inject 1 mg into the skin every 7 days.    SERTRALINE (ZOLOFT) 25 MG TABLET    TAKE 1 TABLET(25 MG) BY MOUTH EVERY DAY   Changed and/or Refilled Medications    Modified Medication Previous Medication    HYDROXYCHLOROQUINE (PLAQUENIL) 200 MG TABLET hydrOXYchloroQUINE (PLAQUENIL) 200 mg tablet       Take 1 tablet (200 mg total) by mouth once daily.    Take 1 tablet (200 mg total) by mouth 2 (two) times daily.         Disclaimer: This note was prepared using voice recognition system and is likely to have sound alike errors and is not proofread.  Please message me with any questions.    32 minutes of total time spent on the encounter, which includes face to face time and non-face to face time preparing to see the patient (eg, review of tests), Obtaining and/or reviewing separately obtained history, Documenting clinical information in the electronic or other health record, Independently interpreting results (not separately reported) and communicating results to the patient/family/caregiver, or Care coordination (not separately reported).     Thank you for allowing me to participate in the care of Olesya Tay.    Manjinder Goodwin MD

## 2023-05-07 ENCOUNTER — PATIENT MESSAGE (OUTPATIENT)
Dept: RHEUMATOLOGY | Facility: CLINIC | Age: 48
End: 2023-05-07
Payer: COMMERCIAL

## 2023-05-07 DIAGNOSIS — M35.01 SJOGREN'S SYNDROME WITH KERATOCONJUNCTIVITIS SICCA: Primary | ICD-10-CM

## 2023-05-08 ENCOUNTER — PATIENT MESSAGE (OUTPATIENT)
Dept: RHEUMATOLOGY | Facility: CLINIC | Age: 48
End: 2023-05-08
Payer: COMMERCIAL

## 2023-05-11 ENCOUNTER — PATIENT MESSAGE (OUTPATIENT)
Dept: CARDIOLOGY | Facility: CLINIC | Age: 48
End: 2023-05-11
Payer: COMMERCIAL

## 2023-06-12 DIAGNOSIS — K21.9 GASTROESOPHAGEAL REFLUX DISEASE, UNSPECIFIED WHETHER ESOPHAGITIS PRESENT: ICD-10-CM

## 2023-06-12 RX ORDER — ESOMEPRAZOLE MAGNESIUM 40 MG/1
40 CAPSULE, DELAYED RELEASE ORAL
Qty: 30 CAPSULE | Refills: 0 | Status: SHIPPED | OUTPATIENT
Start: 2023-06-12 | End: 2023-10-09 | Stop reason: SDUPTHER

## 2023-06-12 NOTE — TELEPHONE ENCOUNTER
Care Due:                  Date            Visit Type   Department     Provider  --------------------------------------------------------------------------------                                EP -                              PRIMARY      [facility]  Beena Mainampati  Last Visit: 03-      CARE ([facility])   MEDICINE       Kwon                              EP -                              PRIMARY      [facility]  Beena Mainampati  Next Visit: 10-      CARE ([facility])   MEDICINE       Kwon                                                            Last  Test          Frequency    Reason                     Performed    Due Date  --------------------------------------------------------------------------------    HBA1C.......  6 months...  semaglutide..............  10-   04-    [facility] Embedded Care Due Messages. Reference number: [id number].   6/12/2023 10:19:05 AM CDT

## 2023-06-19 ENCOUNTER — PATIENT MESSAGE (OUTPATIENT)
Dept: INTERNAL MEDICINE | Facility: CLINIC | Age: 48
End: 2023-06-19
Payer: COMMERCIAL

## 2023-06-19 DIAGNOSIS — E66.9 OBESITY (BMI 30.0-34.9): ICD-10-CM

## 2023-06-19 RX ORDER — SEMAGLUTIDE 1.34 MG/ML
1 INJECTION, SOLUTION SUBCUTANEOUS
Qty: 9.64 ML | Refills: 3 | Status: SHIPPED | OUTPATIENT
Start: 2023-06-19 | End: 2023-06-29 | Stop reason: SDUPTHER

## 2023-06-19 NOTE — TELEPHONE ENCOUNTER
No care due was identified.  [facility] Embedded Care Due Messages. Reference number: [id number].   6/19/2023 9:07:28 AM CDT

## 2023-06-22 ENCOUNTER — PATIENT MESSAGE (OUTPATIENT)
Dept: INTERNAL MEDICINE | Facility: CLINIC | Age: 48
End: 2023-06-22
Payer: COMMERCIAL

## 2023-06-28 ENCOUNTER — PATIENT MESSAGE (OUTPATIENT)
Dept: INTERNAL MEDICINE | Facility: CLINIC | Age: 48
End: 2023-06-28
Payer: COMMERCIAL

## 2023-06-29 ENCOUNTER — TELEPHONE (OUTPATIENT)
Dept: INTERNAL MEDICINE | Facility: CLINIC | Age: 48
End: 2023-06-29
Payer: COMMERCIAL

## 2023-06-29 ENCOUNTER — PATIENT MESSAGE (OUTPATIENT)
Dept: INTERNAL MEDICINE | Facility: CLINIC | Age: 48
End: 2023-06-29
Payer: COMMERCIAL

## 2023-06-29 DIAGNOSIS — R53.82 CHRONIC FATIGUE: ICD-10-CM

## 2023-06-29 DIAGNOSIS — E66.9 OBESITY (BMI 30.0-34.9): ICD-10-CM

## 2023-06-29 DIAGNOSIS — R41.3 MEMORY DEFICITS: ICD-10-CM

## 2023-06-29 RX ORDER — SERTRALINE HYDROCHLORIDE 25 MG/1
25 TABLET, FILM COATED ORAL DAILY
Qty: 90 TABLET | Refills: 1 | Status: SHIPPED | OUTPATIENT
Start: 2023-06-29

## 2023-06-29 RX ORDER — SERTRALINE HYDROCHLORIDE 25 MG/1
25 TABLET, FILM COATED ORAL DAILY
Qty: 90 TABLET | Refills: 1 | Status: SHIPPED | OUTPATIENT
Start: 2023-06-29 | End: 2023-06-29 | Stop reason: SDUPTHER

## 2023-06-29 RX ORDER — SEMAGLUTIDE 1.34 MG/ML
1 INJECTION, SOLUTION SUBCUTANEOUS
Qty: 9.64 ML | Refills: 3 | Status: SHIPPED | OUTPATIENT
Start: 2023-06-29 | End: 2023-07-05 | Stop reason: RX

## 2023-06-29 NOTE — TELEPHONE ENCOUNTER
No care due was identified.  [facility] Embedded Care Due Messages. Reference number: [id number].   6/29/2023 11:12:21 AM CDT

## 2023-06-29 NOTE — TELEPHONE ENCOUNTER
----- Message from Melisa Schulte sent at 6/29/2023  9:54 AM CDT -----  Contact: rain  Patient is calling to speak with the nurse regarding questions and concerns. Reports needing to speak with the nurse regarding medication and PA, and stats changed pharmacy. Please give the patient a call back at .[phone]   Thanks ronald

## 2023-07-05 ENCOUNTER — PATIENT MESSAGE (OUTPATIENT)
Dept: INTERNAL MEDICINE | Facility: CLINIC | Age: 48
End: 2023-07-05
Payer: COMMERCIAL

## 2023-07-05 DIAGNOSIS — E66.9 OBESITY (BMI 30.0-34.9): Primary | ICD-10-CM

## 2023-07-05 RX ORDER — SEMAGLUTIDE 1 MG/.5ML
1 INJECTION, SOLUTION SUBCUTANEOUS
Qty: 3 ML | Refills: 1 | Status: SHIPPED | OUTPATIENT
Start: 2023-07-05 | End: 2023-07-14 | Stop reason: SDUPTHER

## 2023-07-10 ENCOUNTER — PATIENT MESSAGE (OUTPATIENT)
Dept: RHEUMATOLOGY | Facility: CLINIC | Age: 48
End: 2023-07-10
Payer: COMMERCIAL

## 2023-07-11 DIAGNOSIS — E66.9 OBESITY (BMI 30.0-34.9): ICD-10-CM

## 2023-07-11 RX ORDER — SEMAGLUTIDE 1.34 MG/ML
1 INJECTION, SOLUTION SUBCUTANEOUS
COMMUNITY
Start: 2023-05-12 | End: 2023-07-14 | Stop reason: ALTCHOICE

## 2023-07-11 NOTE — TELEPHONE ENCOUNTER
No care due was identified.  [facility] Embedded Care Due Messages. Reference number: [id number].   7/11/2023 12:50:06 PM CDT

## 2023-07-12 RX ORDER — TIRZEPATIDE 5 MG/.5ML
INJECTION, SOLUTION SUBCUTANEOUS
Refills: 0 | OUTPATIENT
Start: 2023-07-12

## 2023-07-12 NOTE — TELEPHONE ENCOUNTER
Refill Decision Note   Olesya Jasvir  is requesting a refill authorization.  Brief Assessment and Rationale for Refill:  Quick Discontinue     Medication Therapy Plan:  Dose adjustment 7/5/23      Comments:     Note composed:2:41 AM 07/12/2023

## 2023-07-14 ENCOUNTER — PATIENT MESSAGE (OUTPATIENT)
Dept: INTERNAL MEDICINE | Facility: CLINIC | Age: 48
End: 2023-07-14
Payer: COMMERCIAL

## 2023-07-14 DIAGNOSIS — E66.9 OBESITY (BMI 30.0-34.9): ICD-10-CM

## 2023-07-14 RX ORDER — SEMAGLUTIDE 1 MG/.5ML
1 INJECTION, SOLUTION SUBCUTANEOUS
Qty: 2 ML | Refills: 1 | Status: SHIPPED | OUTPATIENT
Start: 2023-07-14 | End: 2023-08-02 | Stop reason: DRUGHIGH

## 2023-07-19 ENCOUNTER — TELEPHONE (OUTPATIENT)
Dept: PHARMACY | Facility: CLINIC | Age: 48
End: 2023-07-19
Payer: COMMERCIAL

## 2023-07-25 ENCOUNTER — PATIENT MESSAGE (OUTPATIENT)
Dept: INTERNAL MEDICINE | Facility: CLINIC | Age: 48
End: 2023-07-25
Payer: COMMERCIAL

## 2023-08-02 ENCOUNTER — PATIENT MESSAGE (OUTPATIENT)
Dept: INTERNAL MEDICINE | Facility: CLINIC | Age: 48
End: 2023-08-02
Payer: COMMERCIAL

## 2023-08-02 DIAGNOSIS — E66.9 OBESITY (BMI 30.0-34.9): Primary | ICD-10-CM

## 2023-08-02 RX ORDER — SEMAGLUTIDE 1.7 MG/.75ML
1.7 INJECTION, SOLUTION SUBCUTANEOUS
Qty: 3 ML | Refills: 1 | Status: SHIPPED | OUTPATIENT
Start: 2023-08-02 | End: 2023-10-24

## 2023-09-13 ENCOUNTER — PATIENT MESSAGE (OUTPATIENT)
Dept: RHEUMATOLOGY | Facility: CLINIC | Age: 48
End: 2023-09-13
Payer: COMMERCIAL

## 2023-09-25 ENCOUNTER — PATIENT MESSAGE (OUTPATIENT)
Dept: INTERNAL MEDICINE | Facility: CLINIC | Age: 48
End: 2023-09-25
Payer: COMMERCIAL

## 2023-09-27 NOTE — ASSESSMENT & PLAN NOTE
General weight loss/lifestyle modification strategies discussed (elicit support from others; identify saboteurs; non-food rewards).  Diet interventions: low calorie (1000 kCal/d) deficit diet    Please advise on patient note per CC

## 2023-10-09 ENCOUNTER — OFFICE VISIT (OUTPATIENT)
Dept: CARDIOLOGY | Facility: CLINIC | Age: 48
End: 2023-10-09
Payer: COMMERCIAL

## 2023-10-09 VITALS
BODY MASS INDEX: 31.59 KG/M2 | DIASTOLIC BLOOD PRESSURE: 78 MMHG | HEART RATE: 86 BPM | SYSTOLIC BLOOD PRESSURE: 110 MMHG | HEIGHT: 67 IN | WEIGHT: 201.25 LBS | OXYGEN SATURATION: 97 %

## 2023-10-09 DIAGNOSIS — K21.9 GASTROESOPHAGEAL REFLUX DISEASE, UNSPECIFIED WHETHER ESOPHAGITIS PRESENT: ICD-10-CM

## 2023-10-09 DIAGNOSIS — R00.2 PALPITATIONS: ICD-10-CM

## 2023-10-09 DIAGNOSIS — M35.9 XEROSTOMIA DUE TO AUTOIMMUNE DISEASE: ICD-10-CM

## 2023-10-09 DIAGNOSIS — Z01.810 PREOP CARDIOVASCULAR EXAM: ICD-10-CM

## 2023-10-09 DIAGNOSIS — K11.7 XEROSTOMIA DUE TO AUTOIMMUNE DISEASE: ICD-10-CM

## 2023-10-09 DIAGNOSIS — E66.9 OBESITY (BMI 30.0-34.9): Primary | ICD-10-CM

## 2023-10-09 DIAGNOSIS — G47.33 OSA ON CPAP: ICD-10-CM

## 2023-10-09 PROCEDURE — 1159F PR MEDICATION LIST DOCUMENTED IN MEDICAL RECORD: ICD-10-PCS | Mod: CPTII,S$GLB,, | Performed by: INTERNAL MEDICINE

## 2023-10-09 PROCEDURE — 3078F PR MOST RECENT DIASTOLIC BLOOD PRESSURE < 80 MM HG: ICD-10-PCS | Mod: CPTII,S$GLB,, | Performed by: INTERNAL MEDICINE

## 2023-10-09 PROCEDURE — 1159F MED LIST DOCD IN RCRD: CPT | Mod: CPTII,S$GLB,, | Performed by: INTERNAL MEDICINE

## 2023-10-09 PROCEDURE — 3074F PR MOST RECENT SYSTOLIC BLOOD PRESSURE < 130 MM HG: ICD-10-PCS | Mod: CPTII,S$GLB,, | Performed by: INTERNAL MEDICINE

## 2023-10-09 PROCEDURE — 3074F SYST BP LT 130 MM HG: CPT | Mod: CPTII,S$GLB,, | Performed by: INTERNAL MEDICINE

## 2023-10-09 PROCEDURE — 99999 PR PBB SHADOW E&M-EST. PATIENT-LVL III: ICD-10-PCS | Mod: PBBFAC,,, | Performed by: INTERNAL MEDICINE

## 2023-10-09 PROCEDURE — 99214 OFFICE O/P EST MOD 30 MIN: CPT | Mod: S$GLB,,, | Performed by: INTERNAL MEDICINE

## 2023-10-09 PROCEDURE — 99214 PR OFFICE/OUTPT VISIT, EST, LEVL IV, 30-39 MIN: ICD-10-PCS | Mod: S$GLB,,, | Performed by: INTERNAL MEDICINE

## 2023-10-09 PROCEDURE — 3008F BODY MASS INDEX DOCD: CPT | Mod: CPTII,S$GLB,, | Performed by: INTERNAL MEDICINE

## 2023-10-09 PROCEDURE — 99999 PR PBB SHADOW E&M-EST. PATIENT-LVL III: CPT | Mod: PBBFAC,,, | Performed by: INTERNAL MEDICINE

## 2023-10-09 PROCEDURE — 3078F DIAST BP <80 MM HG: CPT | Mod: CPTII,S$GLB,, | Performed by: INTERNAL MEDICINE

## 2023-10-09 PROCEDURE — 3008F PR BODY MASS INDEX (BMI) DOCUMENTED: ICD-10-PCS | Mod: CPTII,S$GLB,, | Performed by: INTERNAL MEDICINE

## 2023-10-09 RX ORDER — ESOMEPRAZOLE MAGNESIUM 40 MG/1
40 CAPSULE, DELAYED RELEASE ORAL
Qty: 30 CAPSULE | Refills: 1 | Status: SHIPPED | OUTPATIENT
Start: 2023-10-09 | End: 2023-10-10

## 2023-10-09 NOTE — PROGRESS NOTES
Subjective:   Patient ID:  Olesya Tay is a 48 y.o. female who presents for evaluation of No chief complaint on file.        HPI  10.9.23  Comes in for a 1 year follow-up.    Her stress echo was normal last year.    She has been struggling losing weight.    Denies any chest pain, still have some palpitations.    She does drink 3 cups of coffee a day and sometimes ice drink in the evening.  She had a normal stress echo however she did have some hypertensive response.  Also states that she did not sleep that night.    Her Holter monitor showed about 1% of PVCs.    10.2022  47-year-old female, never smoker, history of SEAMUS positive, Sjogren's disease.    Comes in for evaluation of dyspnea on exertion.  NYHA 1. She states not all the time but sporadic.    Also sometimes she has dyspnea while she is resting.  States that she has to yawn for her to feel better.    She denies any lower extremity swelling.  She does have obstructive sleep apnea she is not always consistent with using her CPAP.    She does also report palpitations short lasting.  Also sporadic.  On exam she has either PACs or PVCs.  She reports she was told she had mitral valve prolapse about 10 years ago.  No records available.      Past Medical History:   Diagnosis Date    Constipation     Depression     Endometriosis of pelvis     GERD (gastroesophageal reflux disease)     Gestational diabetes mellitus in pregnancy     Lung nodule     Sjogren's syndrome        History reviewed. No pertinent surgical history.    Social History     Tobacco Use    Smoking status: Never    Smokeless tobacco: Never   Substance Use Topics    Alcohol use: Yes     Alcohol/week: 6.0 standard drinks of alcohol     Types: 6 Glasses of wine per week     Comment: socially    Drug use: Never       Family History   Problem Relation Age of Onset    Early death Mother 52        hepatitis c    Heart disease Maternal Grandfather     Cancer Paternal Grandmother         stomach     Arthritis Maternal Aunt         rheumatoid    Breast cancer Paternal Aunt     Stroke Paternal Grandfather        Review of Systems   Cardiovascular:  Negative for chest pain, dyspnea on exertion, palpitations and syncope.   Genitourinary: Negative.    Neurological: Negative.        Current Outpatient Medications on File Prior to Visit   Medication Sig    EPINEPHrine (EPIPEN) 0.3 mg/0.3 mL AtIn Inject 0.3 mLs (0.3 mg total) into the muscle once. If symptoms not improved repeat in about 5-15 minutes - inject a second dose (pen) of 0.3 mL into muscle once. for 1 dose    fluticasone propionate (FLONASE) 50 mcg/actuation nasal spray 1 SPRAY BY EACH NOSTRIL ONCE DAILY    hydrOXYchloroQUINE (PLAQUENIL) 200 mg tablet Take 1 tablet (200 mg total) by mouth once daily.    metroNIDAZOLE (METROGEL) 0.75 % gel AAA bid. For redness    natrexone tablet 4.5 mg Take 1 tablet (4.5 mg total) by mouth every evening.    nystatin-triamcinolone (MYCOLOG II) cream Apply to affected area 2 times daily    semaglutide, weight loss, (WEGOVY) 1.7 mg/0.75 mL PnIj Inject 1.7 mg into the skin every 7 days.    sertraline (ZOLOFT) 25 MG tablet Take 1 tablet (25 mg total) by mouth once daily.    [DISCONTINUED] esomeprazole (NEXIUM) 40 MG capsule Take 1 capsule (40 mg total) by mouth before breakfast.     No current facility-administered medications on file prior to visit.       Objective:   Objective:  Wt Readings from Last 3 Encounters:   10/09/23 91.3 kg (201 lb 4.5 oz)   04/25/23 89.5 kg (197 lb 5 oz)   03/28/23 89.9 kg (198 lb 3.1 oz)     Temp Readings from Last 3 Encounters:   10/25/22 97.7 °F (36.5 °C)   03/06/21 98.1 °F (36.7 °C) (Tympanic)   08/26/19 99.3 °F (37.4 °C) (Tympanic)     BP Readings from Last 3 Encounters:   10/09/23 110/78   04/25/23 (!) 146/85   03/28/23 120/76     Pulse Readings from Last 3 Encounters:   10/09/23 86   04/25/23 80   03/28/23 103       Physical Exam  Vitals reviewed.   Constitutional:       Appearance: She is  "well-developed.   Neck:      Vascular: No carotid bruit.   Cardiovascular:      Rate and Rhythm: Normal rate and regular rhythm.      Pulses: Intact distal pulses.      Heart sounds: Normal heart sounds. No murmur heard.  Pulmonary:      Breath sounds: Normal breath sounds.   Neurological:      Mental Status: She is oriented to person, place, and time.         Lab Results   Component Value Date    CHOL 207 (H) 10/25/2022    CHOL 187 09/09/2019    CHOL 177 10/22/2018     Lab Results   Component Value Date    HDL 57 10/25/2022    HDL 50 09/09/2019    HDL 52 10/22/2018     Lab Results   Component Value Date    LDLCALC 127.6 10/25/2022    LDLCALC 110.2 09/09/2019    LDLCALC 107.4 10/22/2018     Lab Results   Component Value Date    TRIG 112 10/25/2022    TRIG 134 09/09/2019    TRIG 88 10/22/2018     Lab Results   Component Value Date    CHOLHDL 27.5 10/25/2022    CHOLHDL 26.7 09/09/2019    CHOLHDL 29.4 10/22/2018       Chemistry        Component Value Date/Time     03/28/2023 1552    K 3.8 03/28/2023 1552     03/28/2023 1552    CO2 24 03/28/2023 1552    BUN 11 03/28/2023 1552    CREATININE 0.8 03/28/2023 1552    GLU 71 03/28/2023 1552        Component Value Date/Time    CALCIUM 10.0 03/28/2023 1552    ALKPHOS 85 03/28/2023 1552    AST 22 03/28/2023 1552    ALT 21 03/28/2023 1552    BILITOT 0.3 03/28/2023 1552    ESTGFRAFRICA >60 04/19/2022 1252    EGFRNONAA >60 04/19/2022 1252          Lab Results   Component Value Date    TSH 1.984 03/28/2023     No results found for: "INR", "PROTIME"  Lab Results   Component Value Date    WBC 5.64 10/25/2022    HGB 13.0 10/25/2022    HCT 40.0 10/25/2022    MCV 92 10/25/2022     10/25/2022     BNP  @LABRCNTIP(BNP,BNPTRIAGEBLO)@  CrCl cannot be calculated (Patient's most recent lab result is older than the maximum 7 days allowed.).     Imaging:  ======  No results found for this or any previous visit.    No results found for this or any previous visit.    No results " found for this or any previous visit.    Results for orders placed during the hospital encounter of 02/12/16    X-Ray Chest PA And Lateral    Narrative  2 view chest    Comparison: None    Findings: The lungs are clear and free of infiltrate.  No pleural effusion or pneumothorax is identified.  The heart is not enlarged.  IMPRESSION:      1.  No acute cardiopulmonary process.  ______________________________________    Electronically signed by: REFUGIO GOODEN D.O.  Date:     02/12/16  Time:    09:18    No results found for this or any previous visit.    No valid procedures specified.    Diagnostic Results:  ECG: Reviewed      Summary    The test was stopped because the patient experienced fatigue and shortness of breath. The patient reached the end of the protocol. The patient requested the test to be stopped.  The left ventricle is normal in size with normal systolic function.  The estimated ejection fraction is 65%.  Indeterminate left ventricular diastolic function.  Normal right ventricular size with normal right ventricular systolic function.  Mild tricuspid regurgitation.  Normal central venous pressure (3 mmHg).  The estimated PA systolic pressure is 27 mmHg.  The stress echo portion of this study is negative for myocardial ischemia.  The patient's exercise capacity was normal.  During stress, the following significant arrhythmias were observed: occasional PVCs.  The ECG portion of this study is negative for myocardial ischemia  The 10-year ASCVD risk score (Erving DK, et al., 2019) is: 0.8%    Values used to calculate the score:      Age: 48 years      Sex: Female      Is Non- : No      Diabetic: No      Tobacco smoker: No      Systolic Blood Pressure: 110 mmHg      Is BP treated: No      HDL Cholesterol: 57 mg/dL      Total Cholesterol: 207 mg/dL    Assessment and Plan:   Obesity (BMI 30.0-34.9)    SIENA on CPAP    Xerostomia due to autoimmune disease    Palpitations    Preop  cardiovascular exam        Reviewed all tests and above medical conditions with patient in detail and formulated treatment plan.  Risk factor modification discussed.   Cardiac low salt diet discussed.  Maintaining healthy weight and weight loss goals were discussed in clinic.  Cpap compliance  Okay to undergo her breast surgery as well as her cosmetic facial surgery later on this year.    Normal stress echo.    Recommended exercise in the form of walking 30 minutes per day 5 days a week.    Reviewed stress echo and Holter monitor results.  Discussed possible prescription of beta-blocker in the future if symptoms gets uncontrolled.  We will continue to follow clinically for now.    Follow up in 12-months

## 2023-10-10 DIAGNOSIS — K21.9 GASTROESOPHAGEAL REFLUX DISEASE, UNSPECIFIED WHETHER ESOPHAGITIS PRESENT: ICD-10-CM

## 2023-10-10 RX ORDER — ESOMEPRAZOLE MAGNESIUM 40 MG/1
40 CAPSULE, DELAYED RELEASE ORAL
Qty: 90 CAPSULE | Refills: 1 | Status: SHIPPED | OUTPATIENT
Start: 2023-10-10

## 2023-10-10 NOTE — TELEPHONE ENCOUNTER
No care due was identified.  [facility] Embedded Care Due Messages. Reference number: [id number].   10/10/2023 9:34:33 AM CDT

## 2023-10-10 NOTE — TELEPHONE ENCOUNTER
Refill Decision Note   Olesya Tay  is requesting a refill authorization.  Brief Assessment and Rationale for Refill:  Approve     Medication Therapy Plan:         Comments:     Note composed:9:34 AM 10/10/2023             Appointments     Last Visit   3/28/2023 Beena Kwon MD   Next Visit   Visit date not found Beena Kwon MD

## 2023-10-23 DIAGNOSIS — E66.9 OBESITY (BMI 30.0-34.9): ICD-10-CM

## 2023-10-23 NOTE — TELEPHONE ENCOUNTER
Care Due:                  Date            Visit Type   Department     Provider  --------------------------------------------------------------------------------                                EP -                              PRIMARY      [facility] INTERNAL  Beena Mainampati  Last Visit: 03-      CARE ([facility])   MEDICINE       Doyle  Next Visit: None Scheduled  None         None Found                                                            Last  Test          Frequency    Reason                     Performed    Due Date  --------------------------------------------------------------------------------    HBA1C.......  6 months...  semaglutide,.............  10-   04-    [facility] Embedded Care Due Messages. Reference number: [id number].   10/23/2023 1:28:07 AM CDT

## 2023-10-24 RX ORDER — SEMAGLUTIDE 1.7 MG/.75ML
1.7 INJECTION, SOLUTION SUBCUTANEOUS
Qty: 3 EACH | Refills: 1 | Status: SHIPPED | OUTPATIENT
Start: 2023-10-24 | End: 2023-11-01

## 2023-10-24 NOTE — TELEPHONE ENCOUNTER
Refill Routing Note   Medication(s) are not appropriate for processing by [facility] for the following reason(s):      Required labs outdated  New or recently adjusted medication    [facility] action(s):  Defer Care Due:  Labs due            Appointments  past 12m or future 3m with PCP    Date Provider   Last Visit   3/28/2023 Beena Kwon MD   Next Visit   Visit date not found Beena Kwon MD   ED visits in past 90 days: 0        Note composed:7:37 PM 10/23/2023

## 2023-10-30 ENCOUNTER — PATIENT MESSAGE (OUTPATIENT)
Dept: RHEUMATOLOGY | Facility: CLINIC | Age: 48
End: 2023-10-30
Payer: COMMERCIAL

## 2023-10-30 ENCOUNTER — PATIENT MESSAGE (OUTPATIENT)
Dept: CARDIOLOGY | Facility: CLINIC | Age: 48
End: 2023-10-30
Payer: COMMERCIAL

## 2023-11-01 ENCOUNTER — LAB VISIT (OUTPATIENT)
Dept: LAB | Facility: HOSPITAL | Age: 48
End: 2023-11-01
Payer: COMMERCIAL

## 2023-11-01 ENCOUNTER — OFFICE VISIT (OUTPATIENT)
Dept: INTERNAL MEDICINE | Facility: CLINIC | Age: 48
End: 2023-11-01
Payer: COMMERCIAL

## 2023-11-01 ENCOUNTER — HOSPITAL ENCOUNTER (OUTPATIENT)
Dept: CARDIOLOGY | Facility: HOSPITAL | Age: 48
Discharge: HOME | End: 2023-11-01
Attending: FAMILY MEDICINE
Payer: COMMERCIAL

## 2023-11-01 ENCOUNTER — PATIENT MESSAGE (OUTPATIENT)
Dept: INTERNAL MEDICINE | Facility: CLINIC | Age: 48
End: 2023-11-01

## 2023-11-01 VITALS
OXYGEN SATURATION: 99 % | HEART RATE: 89 BPM | WEIGHT: 206.13 LBS | RESPIRATION RATE: 20 BRPM | BODY MASS INDEX: 32.35 KG/M2 | DIASTOLIC BLOOD PRESSURE: 80 MMHG | HEIGHT: 67 IN | SYSTOLIC BLOOD PRESSURE: 126 MMHG

## 2023-11-01 DIAGNOSIS — Z23 NEED FOR PROPHYLACTIC VACCINATION WITH STREPTOCOCCUS PNEUMONIAE (PNEUMOCOCCUS) AND INFLUENZA VACCINES: ICD-10-CM

## 2023-11-01 DIAGNOSIS — Z00.00 ROUTINE GENERAL MEDICAL EXAMINATION AT A HEALTH CARE FACILITY: ICD-10-CM

## 2023-11-01 DIAGNOSIS — Z41.1 ENCOUNTER FOR COSMETIC PROCEDURE: ICD-10-CM

## 2023-11-01 DIAGNOSIS — Z01.818 PRE-OPERATIVE CLEARANCE: ICD-10-CM

## 2023-11-01 DIAGNOSIS — Z12.31 SCREENING MAMMOGRAM FOR HIGH-RISK PATIENT: ICD-10-CM

## 2023-11-01 DIAGNOSIS — R76.8 RHEUMATOID FACTOR POSITIVE: ICD-10-CM

## 2023-11-01 DIAGNOSIS — R91.8 MULTIPLE PULMONARY NODULES: ICD-10-CM

## 2023-11-01 DIAGNOSIS — E66.9 OBESITY (BMI 30.0-34.9): ICD-10-CM

## 2023-11-01 DIAGNOSIS — Z79.899 DRUG-INDUCED IMMUNODEFICIENCY: ICD-10-CM

## 2023-11-01 DIAGNOSIS — M35.01 SJOGREN'S SYNDROME WITH KERATOCONJUNCTIVITIS SICCA: ICD-10-CM

## 2023-11-01 DIAGNOSIS — Z00.00 ROUTINE GENERAL MEDICAL EXAMINATION AT A HEALTH CARE FACILITY: Primary | ICD-10-CM

## 2023-11-01 DIAGNOSIS — D84.821 DRUG-INDUCED IMMUNODEFICIENCY: ICD-10-CM

## 2023-11-01 DIAGNOSIS — G47.33 OSA ON CPAP: ICD-10-CM

## 2023-11-01 LAB
BACTERIA #/AREA URNS HPF: NORMAL /HPF
BILIRUB UR QL STRIP: NEGATIVE
CLARITY UR: CLEAR
COLOR UR: YELLOW
GLUCOSE UR QL STRIP: NEGATIVE
HGB UR QL STRIP: ABNORMAL
KETONES UR QL STRIP: NEGATIVE
LEUKOCYTE ESTERASE UR QL STRIP: NEGATIVE
MICROSCOPIC COMMENT: NORMAL
NITRITE UR QL STRIP: NEGATIVE
PH UR STRIP: 7 [PH] (ref 5–8)
PROT UR QL STRIP: NEGATIVE
RBC #/AREA URNS HPF: 3 /HPF (ref 0–4)
SP GR UR STRIP: 1.02 (ref 1–1.03)
SQUAMOUS #/AREA URNS HPF: 5 /HPF
URN SPEC COLLECT METH UR: ABNORMAL
WBC #/AREA URNS HPF: 3 /HPF (ref 0–5)

## 2023-11-01 PROCEDURE — 99213 OFFICE O/P EST LOW 20 MIN: CPT | Mod: 25,S$GLB,, | Performed by: FAMILY MEDICINE

## 2023-11-01 PROCEDURE — 1159F MED LIST DOCD IN RCRD: CPT | Mod: CPTII,S$GLB,, | Performed by: FAMILY MEDICINE

## 2023-11-01 PROCEDURE — 93005 ELECTROCARDIOGRAM TRACING: CPT

## 2023-11-01 PROCEDURE — 99396 PR PREVENTIVE VISIT,EST,40-64: ICD-10-PCS | Mod: 25,S$GLB,, | Performed by: FAMILY MEDICINE

## 2023-11-01 PROCEDURE — 1159F PR MEDICATION LIST DOCUMENTED IN MEDICAL RECORD: ICD-10-PCS | Mod: CPTII,S$GLB,, | Performed by: FAMILY MEDICINE

## 2023-11-01 PROCEDURE — 81000 URINALYSIS NONAUTO W/SCOPE: CPT | Performed by: FAMILY MEDICINE

## 2023-11-01 PROCEDURE — 1160F RVW MEDS BY RX/DR IN RCRD: CPT | Mod: CPTII,S$GLB,, | Performed by: FAMILY MEDICINE

## 2023-11-01 PROCEDURE — 99999 PR PBB SHADOW E&M-EST. PATIENT-LVL V: ICD-10-PCS | Mod: PBBFAC,,, | Performed by: FAMILY MEDICINE

## 2023-11-01 PROCEDURE — 3074F PR MOST RECENT SYSTOLIC BLOOD PRESSURE < 130 MM HG: ICD-10-PCS | Mod: CPTII,S$GLB,, | Performed by: FAMILY MEDICINE

## 2023-11-01 PROCEDURE — 3079F DIAST BP 80-89 MM HG: CPT | Mod: CPTII,S$GLB,, | Performed by: FAMILY MEDICINE

## 2023-11-01 PROCEDURE — 1160F PR REVIEW ALL MEDS BY PRESCRIBER/CLIN PHARMACIST DOCUMENTED: ICD-10-PCS | Mod: CPTII,S$GLB,, | Performed by: FAMILY MEDICINE

## 2023-11-01 PROCEDURE — 90677 PCV20 VACCINE IM: CPT | Mod: S$GLB,,, | Performed by: FAMILY MEDICINE

## 2023-11-01 PROCEDURE — 90677 PNEUMOCOCCAL CONJUGATE VACCINE 20-VALENT: ICD-10-PCS | Mod: S$GLB,,, | Performed by: FAMILY MEDICINE

## 2023-11-01 PROCEDURE — 3008F PR BODY MASS INDEX (BMI) DOCUMENTED: ICD-10-PCS | Mod: CPTII,S$GLB,, | Performed by: FAMILY MEDICINE

## 2023-11-01 PROCEDURE — 93010 ELECTROCARDIOGRAM REPORT: CPT | Mod: ,,, | Performed by: INTERNAL MEDICINE

## 2023-11-01 PROCEDURE — 3074F SYST BP LT 130 MM HG: CPT | Mod: CPTII,S$GLB,, | Performed by: FAMILY MEDICINE

## 2023-11-01 PROCEDURE — 3079F PR MOST RECENT DIASTOLIC BLOOD PRESSURE 80-89 MM HG: ICD-10-PCS | Mod: CPTII,S$GLB,, | Performed by: FAMILY MEDICINE

## 2023-11-01 PROCEDURE — 99396 PREV VISIT EST AGE 40-64: CPT | Mod: 25,S$GLB,, | Performed by: FAMILY MEDICINE

## 2023-11-01 PROCEDURE — G0009 ADMIN PNEUMOCOCCAL VACCINE: HCPCS | Mod: S$GLB,,, | Performed by: FAMILY MEDICINE

## 2023-11-01 PROCEDURE — 99213 PR OFFICE/OUTPT VISIT, EST, LEVL III, 20-29 MIN: ICD-10-PCS | Mod: 25,S$GLB,, | Performed by: FAMILY MEDICINE

## 2023-11-01 PROCEDURE — 99999 PR PBB SHADOW E&M-EST. PATIENT-LVL V: CPT | Mod: PBBFAC,,, | Performed by: FAMILY MEDICINE

## 2023-11-01 PROCEDURE — G0009 PNEUMOCOCCAL CONJUGATE VACCINE 20-VALENT: ICD-10-PCS | Mod: S$GLB,,, | Performed by: FAMILY MEDICINE

## 2023-11-01 PROCEDURE — 3008F BODY MASS INDEX DOCD: CPT | Mod: CPTII,S$GLB,, | Performed by: FAMILY MEDICINE

## 2023-11-01 PROCEDURE — 93010 EKG 12-LEAD: ICD-10-PCS | Mod: ,,, | Performed by: INTERNAL MEDICINE

## 2023-11-01 RX ORDER — CIPROFLOXACIN 500 MG/1
500 TABLET ORAL 2 TIMES DAILY
COMMUNITY
Start: 2023-11-01

## 2023-11-01 RX ORDER — ZOLPIDEM TARTRATE 5 MG/1
5 TABLET ORAL NIGHTLY PRN
COMMUNITY
Start: 2023-11-01

## 2023-11-01 RX ORDER — METFORMIN HYDROCHLORIDE 500 MG/1
500 TABLET, EXTENDED RELEASE ORAL 2 TIMES DAILY WITH MEALS
Qty: 180 TABLET | Refills: 3 | Status: SHIPPED | OUTPATIENT
Start: 2023-11-01 | End: 2024-10-31

## 2023-11-01 RX ORDER — OXYCODONE AND ACETAMINOPHEN 5; 325 MG/1; MG/1
TABLET ORAL
COMMUNITY
Start: 2023-11-01

## 2023-11-01 RX ORDER — VALACYCLOVIR HYDROCHLORIDE 1 G/1
1000 TABLET, FILM COATED ORAL
COMMUNITY
Start: 2023-11-01

## 2023-11-01 NOTE — PROGRESS NOTES
Subjective:       Patient ID: Olesya Tay is a 48 y.o. female.    Chief Complaint: Follow-up    48-year-old female patient with Patient Active Problem List:     Sjogren's syndrome with keratoconjunctivitis sicca     Rheumatoid factor positive     Multiple pulmonary nodules     Rosacea     Encounter for long-term current use of high risk medication     Xerostomia due to autoimmune disease     Chronic fatigue     History of gestational diabetes     Hair loss     Obesity (BMI 30.0-34.9)     SIENA on CPAP     Drug-induced immunodeficiency  Here for routine annual physicals and patient is scheduled to get mini face-lift by Dr. Boby Olivo next Thursday, and would like to get preop clearance  Patient secondary to chronic fatigue and Sjogren's was rheumatoid , has been followed by Rheumatology and reports that she has been working for part-time for a long time but secondary to chronic fatigue was unable to continue her job  Patient has been having mild depression and underlying brain fog, taking her medications regularly  Trying to stay physically active but unable to lose weight, was unable to get Wegovy, has not lost significant weight on Ozempic and Wegovy not more than 5-10 lb  Would like to try metformin      Review of Systems   Constitutional:  Positive for fatigue and unexpected weight change. Negative for appetite change.   Eyes:  Negative for visual disturbance.   Respiratory:  Negative for shortness of breath.    Cardiovascular:  Negative for chest pain and leg swelling.   Gastrointestinal:  Negative for abdominal pain, nausea and vomiting.   Musculoskeletal:  Negative for arthralgias and myalgias.   Skin:  Negative for rash.   Neurological:  Negative for light-headedness and headaches.   Psychiatric/Behavioral:  Positive for dysphoric mood. Negative for sleep disturbance. The patient is not nervous/anxious.          /80 (BP Location: Left arm, Patient Position: Sitting, BP Method: Large (Manual))    "Pulse 89   Resp 20   Ht 5' 7" (1.702 m)   Wt 93.5 kg (206 lb 2.1 oz)   SpO2 99%   BMI 32.28 kg/m²   Objective:      Physical Exam  Constitutional:       Appearance: She is well-developed.   HENT:      Head: Normocephalic and atraumatic.   Cardiovascular:      Rate and Rhythm: Normal rate and regular rhythm.      Heart sounds: Normal heart sounds. No murmur heard.  Pulmonary:      Effort: Pulmonary effort is normal.      Breath sounds: Normal breath sounds. No wheezing.   Abdominal:      General: Bowel sounds are normal.      Palpations: Abdomen is soft.      Tenderness: There is no abdominal tenderness.   Skin:     General: Skin is warm and dry.      Findings: No rash.   Neurological:      Mental Status: She is alert and oriented to person, place, and time.   Psychiatric:         Mood and Affect: Mood normal.         Lab Visit on 11/01/2023   Component Date Value Ref Range Status    WBC 11/01/2023 4.83  3.90 - 12.70 K/uL Final    RBC 11/01/2023 4.24  4.00 - 5.40 M/uL Final    Hemoglobin 11/01/2023 13.0  12.0 - 16.0 g/dL Final    Hematocrit 11/01/2023 39.7  37.0 - 48.5 % Final    MCV 11/01/2023 94  82 - 98 fL Final    MCH 11/01/2023 30.7  27.0 - 31.0 pg Final    MCHC 11/01/2023 32.7  32.0 - 36.0 g/dL Final    RDW 11/01/2023 13.0  11.5 - 14.5 % Final    Platelets 11/01/2023 353  150 - 450 K/uL Final    MPV 11/01/2023 8.7 (L)  9.2 - 12.9 fL Final    Immature Granulocytes 11/01/2023 0.2  0.0 - 0.5 % Final    Gran # (ANC) 11/01/2023 2.7  1.8 - 7.7 K/uL Final    Immature Grans (Abs) 11/01/2023 0.01  0.00 - 0.04 K/uL Final    Comment: Mild elevation in immature granulocytes is non specific and   can be seen in a variety of conditions including stress response,   acute inflammation, trauma and pregnancy. Correlation with other   laboratory and clinical findings is essential.      Lymph # 11/01/2023 1.5  1.0 - 4.8 K/uL Final    Mono # 11/01/2023 0.5  0.3 - 1.0 K/uL Final    Eos # 11/01/2023 0.1  0.0 - 0.5 K/uL Final    " Baso # 11/01/2023 0.04  0.00 - 0.20 K/uL Final    nRBC 11/01/2023 0  0 /100 WBC Final    Gran % 11/01/2023 56.8  38.0 - 73.0 % Final    Lymph % 11/01/2023 31.1  18.0 - 48.0 % Final    Mono % 11/01/2023 9.9  4.0 - 15.0 % Final    Eosinophil % 11/01/2023 1.2  0.0 - 8.0 % Final    Basophil % 11/01/2023 0.8  0.0 - 1.9 % Final    Differential Method 11/01/2023 Automated   Final    Sodium 11/01/2023 138  136 - 145 mmol/L Final    Potassium 11/01/2023 3.6  3.5 - 5.1 mmol/L Final    Chloride 11/01/2023 104  95 - 110 mmol/L Final    CO2 11/01/2023 22 (L)  23 - 29 mmol/L Final    Glucose 11/01/2023 84  70 - 110 mg/dL Final    BUN 11/01/2023 13  6 - 20 mg/dL Final    Creatinine 11/01/2023 0.9  0.5 - 1.4 mg/dL Final    Calcium 11/01/2023 9.2  8.7 - 10.5 mg/dL Final    Anion Gap 11/01/2023 12  8 - 16 mmol/L Final    eGFR 11/01/2023 >60.0  >60 mL/min/1.73 m^2 Final    Prothrombin Time 11/01/2023 10.5  9.0 - 12.5 sec Final    INR 11/01/2023 1.0  0.8 - 1.2 Final    Comment: Coumadin Therapy:  2.0 - 3.0 for INR for all indicators except mechanical heart valves  and antiphospholipid syndromes which should use 2.5 - 3.5.      Cholesterol 11/01/2023 221 (H)  120 - 199 mg/dL Final    Comment: The National Cholesterol Education Program (NCEP) has set the  following guidelines (reference ranges) for Cholesterol:  Optimal.....................<200 mg/dL  Borderline High.............200-239 mg/dL  High........................> or = 240 mg/dL      Triglycerides 11/01/2023 125  30 - 150 mg/dL Final    Comment: The National Cholesterol Education Program (NCEP) has set the  following guidelines (reference values) for triglycerides:  Normal......................<150 mg/dL  Borderline High.............150-199 mg/dL  High........................200-499 mg/dL      HDL 11/01/2023 57  40 - 75 mg/dL Final    Comment: The National Cholesterol Education Program (NCEP) has set the  following guidelines (reference values) for HDL  Cholesterol:  Low...............<40 mg/dL  Optimal...........>60 mg/dL      LDL Cholesterol 11/01/2023 139.0  63.0 - 159.0 mg/dL Final    Comment: The National Cholesterol Education Program (NCEP) has set the  following guidelines (reference values) for LDL Cholesterol:  Optimal.......................<130 mg/dL  Borderline High...............130-159 mg/dL  High..........................160-189 mg/dL  Very High.....................>190 mg/dL      HDL/Cholesterol Ratio 11/01/2023 25.8  20.0 - 50.0 % Final    Total Cholesterol/HDL Ratio 11/01/2023 3.9  2.0 - 5.0 Final    Non-HDL Cholesterol 11/01/2023 164  mg/dL Final    Comment: Risk category and Non-HDL cholesterol goals:  Coronary heart disease (CHD)or equivalent (10-year risk of CHD >20%):  Non-HDL cholesterol goal     <130 mg/dL  Two or more CHD risk factors and 10-year risk of CHD <= 20%:  Non-HDL cholesterol goal     <160 mg/dL  0 to 1 CHD risk factor:  Non-HDL cholesterol goal     <190 mg/dL      TSH 11/01/2023 1.859  0.400 - 4.000 uIU/mL Final    Hemoglobin A1C 11/01/2023 4.9  4.0 - 5.6 % Final    Comment: ADA Screening Guidelines:  5.7-6.4%  Consistent with prediabetes  >or=6.5%  Consistent with diabetes    High levels of fetal hemoglobin interfere with the HbA1C  assay. Heterozygous hemoglobin variants (HbS, HgC, etc)do  not significantly interfere with this assay.   However, presence of multiple variants may affect accuracy.      Estimated Avg Glucose 11/01/2023 94  68 - 131 mg/dL Final   Lab Visit on 11/01/2023   Component Date Value Ref Range Status    Specimen UA 11/01/2023 Urine, Clean Catch   Final    Color, UA 11/01/2023 Yellow  Yellow, Straw, Opal Final    Appearance, UA 11/01/2023 Clear  Clear Final    pH, UA 11/01/2023 7.0  5.0 - 8.0 Final    Specific [place], UA 11/01/2023 1.020  1.005 - 1.030 Final    Protein, UA 11/01/2023 Negative  Negative Final    Comment: Recommend a 24 hour urine protein or a urine   protein/creatinine ratio if globulin  induced proteinuria is  clinically suspected.      Glucose, UA 11/01/2023 Negative  Negative Final    Ketones, UA 11/01/2023 Negative  Negative Final    Bilirubin (UA) 11/01/2023 Negative  Negative Final    Occult Blood UA 11/01/2023 1+ (A)  Negative Final    Nitrite, UA 11/01/2023 Negative  Negative Final    Leukocytes, UA 11/01/2023 Negative  Negative Final    RBC, UA 11/01/2023 3  0 - 4 /hpf Final    WBC, UA 11/01/2023 3  0 - 5 /hpf Final    Bacteria 11/01/2023 Occasional  None-Occ /hpf Final    Squam Epithel, UA 11/01/2023 5  /hpf Final    Microscopic Comment 11/01/2023 SEE COMMENT   Final    Comment: Other formed elements not mentioned in the report are not   present in the microscopic examination.        Results for orders placed or performed during the hospital encounter of 11/01/23   EKG 12-lead    Collection Time: 11/01/23  2:32 PM    Narrative    Test Reason : Z00.00,Z41.1,Z01.818,    Vent. Rate : 080 BPM     Atrial Rate : 080 BPM     P-R Int : 144 ms          QRS Dur : 086 ms      QT Int : 366 ms       P-R-T Axes : 059 021 -01 degrees     QTc Int : 422 ms    Sinus rhythm with occasional Premature ventricular complexes  Cannot rule out Anterior infarct ,age undetermined  Abnormal ECG  When compared with ECG of 30-DEC-2022 14:05,  Previous ECG has undetermined rhythm, needs review  Criteria for Inferior infarct are no longer Present  Confirmed by RUTHY MARIN MD ([id number]) on 11/1/2023 8:45:26 PM    Referred By: SHEILA GOODEN           Confirmed By:RUTHY MARIN MD      Assessment/Plan:   1. Routine general medical examination at a health care facility  -     CBC Auto Differential; Future; Expected date: 11/01/2023  -     Basic Metabolic Panel; Future; Expected date: 11/01/2023  -     PROTIME-INR; Future; Expected date: 11/01/2023  -     EKG 12-lead; Future  -     Lipid Panel; Future; Expected date: 11/01/2023  -     TSH; Future; Expected date: 11/01/2023  -     Urinalysis, Reflex to Urine Culture Urine, Clean  Catch; Future; Expected date: 11/01/2023  -     Hemoglobin A1C; Future; Expected date: 11/01/2023  Vital signs stable today.  Clinical exam stable  Continue lifestyle modifications with low-fat and low-cholesterol diet and exercise 30 minutes daily  Prevnar 20 given today    2. Encounter for cosmetic procedure  -     CBC Auto Differential; Future; Expected date: 11/01/2023  -     Basic Metabolic Panel; Future; Expected date: 11/01/2023  -     PROTIME-INR; Future; Expected date: 11/01/2023  -     EKG 12-lead; Future  3. Pre-operative clearance  -     CBC Auto Differential; Future; Expected date: 11/01/2023  -     Basic Metabolic Panel; Future; Expected date: 11/01/2023  -     PROTIME-INR; Future; Expected date: 11/01/2023  -     EKG 12-lead; Future    Will check preop labs and will send preop clearance after reviewing test results  Patient clinically doing well    Reviewed preop labs which looks stable  EKG shows few changes but patient has been cleared by Cardiology on 10/09/2023  Patient clinically asymptomatic  Patient is at low risk for the proposed surgery on Medical standpoint    4. Rheumatoid factor positive  Overview:  CCP + 6.3 (low)  RF 21.0- (low)  5. Sjogren's syndrome with keratoconjunctivitis sicca  11. Drug-induced immunodeficiency  Followed by Rheumatology currently taking Plaquenil and naltrexone  Regarding disability paperwork, patient was informed that she should be discussing further with Rheumatology about it being the treating provider    6. SIENA on CPAP  Encouraged to use it regularly    7. Multiple pulmonary nodules  Followed by pulmonary clinically doing well    8. Obesity (BMI 30.0-34.9)  -     metFORMIN (GLUCOPHAGE-XR) 500 MG ER 24hr tablet; Take 1 tablet (500 mg total) by mouth 2 (two) times daily with meals.  Dispense: 180 tablet; Refill: 3  Will get started on metformin, as patient does not want to continue Wegovy with no significant response  Lifestyle modifications recommended to lose  weight with BMI 32      10. Need for prophylactic vaccination with Streptococcus pneumoniae (Pneumococcus) and Influenza vaccines  -     (In Office Administered) Pneumococcal Conjugate Vaccine (20 Valent) (IM) (Preferred)  Prevnar 20 given today

## 2023-11-02 ENCOUNTER — PATIENT MESSAGE (OUTPATIENT)
Dept: INTERNAL MEDICINE | Facility: CLINIC | Age: 48
End: 2023-11-02
Payer: COMMERCIAL

## 2023-11-02 ENCOUNTER — TELEPHONE (OUTPATIENT)
Dept: INTERNAL MEDICINE | Facility: CLINIC | Age: 48
End: 2023-11-02
Payer: COMMERCIAL

## 2023-11-02 NOTE — TELEPHONE ENCOUNTER
I have reviewed her EKG showing minimal changes but she has already been cleared by Cardiology and as long as she does not have any symptoms she does not have to repeat the EKG     Dr. Kwon

## 2023-11-02 NOTE — TELEPHONE ENCOUNTER
----- Message from Cindy Youngblood sent at 11/2/2023  9:53 AM CDT -----  Contact: pt  Pt is calling in regard to needing to speak w/kay about her ekg.  Please call her back at [phone]  pt is asking if she should take another since she had coffee???

## 2023-11-06 ENCOUNTER — TELEPHONE (OUTPATIENT)
Dept: INTERNAL MEDICINE | Facility: CLINIC | Age: 48
End: 2023-11-06
Payer: COMMERCIAL

## 2023-11-06 NOTE — TELEPHONE ENCOUNTER
----- Message from Kadi Diaz sent at 11/6/2023 10:05 AM CST -----  Regarding: Dr Boby Del Castillo/Nori  States she received the Pre-Op clearance. States she needs the EKG 12 lead wave form. Please call Nori [phone] or fax# [phone]. Thank you

## 2023-11-06 NOTE — TELEPHONE ENCOUNTER
Spoke with Nori with [facility]. Informed her that EKG will be faxed to number provided. Verbalized understanding.//ddw

## 2023-11-07 ENCOUNTER — TELEPHONE (OUTPATIENT)
Dept: INTERNAL MEDICINE | Facility: CLINIC | Age: 48
End: 2023-11-07
Payer: COMMERCIAL

## 2023-11-07 NOTE — TELEPHONE ENCOUNTER
----- Message from Nii Foy sent at 11/7/2023 10:49 AM CST -----  Contact: Nori Julio is calling in regards to not receiving the pt EKG results. Please call back at [phone] fax number [phone]                            Thanks  KT

## 2023-11-08 ENCOUNTER — PATIENT MESSAGE (OUTPATIENT)
Dept: INTERNAL MEDICINE | Facility: CLINIC | Age: 48
End: 2023-11-08
Payer: COMMERCIAL

## 2023-11-08 ENCOUNTER — TELEPHONE (OUTPATIENT)
Dept: CARDIOLOGY | Facility: CLINIC | Age: 48
End: 2023-11-08
Payer: COMMERCIAL

## 2023-11-08 ENCOUNTER — TELEPHONE (OUTPATIENT)
Dept: INTERNAL MEDICINE | Facility: CLINIC | Age: 48
End: 2023-11-08
Payer: COMMERCIAL

## 2023-11-08 NOTE — TELEPHONE ENCOUNTER
Spoke with Nori. Advised that information was faxed on 11/6/2023 twice and twice today as well. States she will check fax machine for fax and if not received will contact office.//ddw

## 2023-11-08 NOTE — TELEPHONE ENCOUNTER
----- Message from Adis Saenz sent at 11/8/2023  1:59 PM CST -----  Contact: Nori-Dr.Pereneck Julio is calling to get pts 12 lead EKG e-mail to her [email or web address] or to the patient [email or web address]. Please call back at [phone]                Thanks  SW

## 2023-11-08 NOTE — TELEPHONE ENCOUNTER
----- Message from Lashay Strong sent at 11/8/2023  2:06 PM CST -----  Dr. Olivo's office is requesting a call back concerning the pt. Call back at [phone]

## 2023-12-15 ENCOUNTER — HOSPITAL ENCOUNTER (OUTPATIENT)
Dept: RADIOLOGY | Facility: HOSPITAL | Age: 48
Discharge: HOME | End: 2023-12-15
Attending: INTERNAL MEDICINE
Payer: COMMERCIAL

## 2023-12-15 ENCOUNTER — OFFICE VISIT (OUTPATIENT)
Dept: PULMONOLOGY | Facility: CLINIC | Age: 48
End: 2023-12-15
Payer: COMMERCIAL

## 2023-12-15 ENCOUNTER — PATIENT MESSAGE (OUTPATIENT)
Dept: PULMONOLOGY | Facility: CLINIC | Age: 48
End: 2023-12-15

## 2023-12-15 VITALS
HEART RATE: 83 BPM | RESPIRATION RATE: 16 BRPM | OXYGEN SATURATION: 98 % | SYSTOLIC BLOOD PRESSURE: 126 MMHG | HEIGHT: 67 IN | DIASTOLIC BLOOD PRESSURE: 70 MMHG | BODY MASS INDEX: 32.32 KG/M2 | WEIGHT: 205.94 LBS

## 2023-12-15 DIAGNOSIS — M35.01 SJOGREN'S SYNDROME WITH KERATOCONJUNCTIVITIS SICCA: ICD-10-CM

## 2023-12-15 DIAGNOSIS — G47.33 OSA ON CPAP: Primary | ICD-10-CM

## 2023-12-15 DIAGNOSIS — G47.00 INSOMNIA, UNSPECIFIED TYPE: ICD-10-CM

## 2023-12-15 DIAGNOSIS — E66.9 OBESITY (BMI 30.0-34.9): ICD-10-CM

## 2023-12-15 DIAGNOSIS — R91.8 MULTIPLE PULMONARY NODULES: ICD-10-CM

## 2023-12-15 PROCEDURE — 71250 CT THORAX DX C-: CPT | Mod: 26,,, | Performed by: RADIOLOGY

## 2023-12-15 PROCEDURE — 3008F PR BODY MASS INDEX (BMI) DOCUMENTED: ICD-10-PCS | Mod: CPTII,S$GLB,, | Performed by: INTERNAL MEDICINE

## 2023-12-15 PROCEDURE — 71250 CT CHEST WITHOUT CONTRAST: ICD-10-PCS | Mod: 26,,, | Performed by: RADIOLOGY

## 2023-12-15 PROCEDURE — 3074F PR MOST RECENT SYSTOLIC BLOOD PRESSURE < 130 MM HG: ICD-10-PCS | Mod: CPTII,S$GLB,, | Performed by: INTERNAL MEDICINE

## 2023-12-15 PROCEDURE — 71250 CT THORAX DX C-: CPT | Mod: TC

## 2023-12-15 PROCEDURE — 99999 PR PBB SHADOW E&M-EST. PATIENT-LVL V: ICD-10-PCS | Mod: PBBFAC,,, | Performed by: INTERNAL MEDICINE

## 2023-12-15 PROCEDURE — 3044F PR MOST RECENT HEMOGLOBIN A1C LEVEL <7.0%: ICD-10-PCS | Mod: CPTII,S$GLB,, | Performed by: INTERNAL MEDICINE

## 2023-12-15 PROCEDURE — 3078F DIAST BP <80 MM HG: CPT | Mod: CPTII,S$GLB,, | Performed by: INTERNAL MEDICINE

## 2023-12-15 PROCEDURE — 1159F MED LIST DOCD IN RCRD: CPT | Mod: CPTII,S$GLB,, | Performed by: INTERNAL MEDICINE

## 2023-12-15 PROCEDURE — 3008F BODY MASS INDEX DOCD: CPT | Mod: CPTII,S$GLB,, | Performed by: INTERNAL MEDICINE

## 2023-12-15 PROCEDURE — 3078F PR MOST RECENT DIASTOLIC BLOOD PRESSURE < 80 MM HG: ICD-10-PCS | Mod: CPTII,S$GLB,, | Performed by: INTERNAL MEDICINE

## 2023-12-15 PROCEDURE — 1159F PR MEDICATION LIST DOCUMENTED IN MEDICAL RECORD: ICD-10-PCS | Mod: CPTII,S$GLB,, | Performed by: INTERNAL MEDICINE

## 2023-12-15 PROCEDURE — 99999 PR PBB SHADOW E&M-EST. PATIENT-LVL V: CPT | Mod: PBBFAC,,, | Performed by: INTERNAL MEDICINE

## 2023-12-15 PROCEDURE — 99214 OFFICE O/P EST MOD 30 MIN: CPT | Mod: S$GLB,,, | Performed by: INTERNAL MEDICINE

## 2023-12-15 PROCEDURE — 3044F HG A1C LEVEL LT 7.0%: CPT | Mod: CPTII,S$GLB,, | Performed by: INTERNAL MEDICINE

## 2023-12-15 PROCEDURE — 99214 PR OFFICE/OUTPT VISIT, EST, LEVL IV, 30-39 MIN: ICD-10-PCS | Mod: S$GLB,,, | Performed by: INTERNAL MEDICINE

## 2023-12-15 PROCEDURE — 3074F SYST BP LT 130 MM HG: CPT | Mod: CPTII,S$GLB,, | Performed by: INTERNAL MEDICINE

## 2023-12-15 NOTE — PROGRESS NOTES
Pulmonary Outpatient  Visit     Subjective:       Patient ID: Olesya Tay is a 48 y.o. female.    Chief Complaint: Apnea and Pulmonary Nodules        Olesya Tay is 47 y.o.  Last seen 03/29/2022  SIENA on CPAP 5-15  Usage > 4 hrs dropped   Used only 3 days  Last visit was using daily  Pressure and discomfort issues  Asked about MAD  Will change to fixed CPAP  Last ct chest nodule 5 mm   Needed f/u: missed f/u during COVID  Normal PFT in past      03/10/2023  Last seen  Follow to review imaging  Chest CT 12/2022; 3 mm and 5 mm nodule  Imaging since 2013, 2015, 2017  On Plaquenil;  Chronic fatigue and poor memory  CPAP adherence was poor  Stress test reviewed: During stress, the following significant arrhythmias were observed: occasional PVCs  Poor memory  Feels weight loss may negate need for CPAP  Offered repeat test  Wants to try CPAP again     12/15/2023  Followup  Lost weight  We have been followuing 2 nodule 3 mm   STABLE  No follup required  No interval health changes  Poor adherence with CPAP  Usage > 4 hrs was 17%  Asked about INSPIRE  Refereal  New supplies         The following portions of the patient's history were reviewed and updated as appropriate:   She  has a past medical history of Constipation; Depression; Endometriosis of pelvis; GERD (gastroesophageal reflux disease); Gestational diabetes mellitus in pregnancy; Lung nodule; and Sjogren's syndrome.  She  does not have any pertinent problems on file.  She  has no past surgical history on file.  Her family history includes Arthritis in her maternal aunt; Cancer in her paternal grandmother; Early death (age of onset: 52) in her mother; Heart disease in her maternal grandfather.  She  reports that she has never smoked. She has never used smokeless tobacco. She reports that she drinks alcohol. She reports that she does not use illicit drugs.  She has a current medication list which includes the  following prescription(s): esomeprazole magnesium, fish oil-omega-3 fatty acids, hydroxychloroquine, and vitamin e.        The following portions of the patient's history were reviewed and updated as appropriate: She  has a past medical history of Constipation, Depression, Endometriosis of pelvis, GERD (gastroesophageal reflux disease), Gestational diabetes mellitus in pregnancy, Lung nodule, and Sjogren's syndrome.  She does not have any pertinent problems on file.  She  has no past surgical history on file.  Her family history includes Arthritis in her maternal aunt; Breast cancer in her paternal aunt; Cancer in her paternal grandmother; Early death (age of onset: 52) in her mother; Heart disease in her maternal grandfather; Stroke in her paternal grandfather.  She  reports that she has never smoked. She has never used smokeless tobacco. She reports current alcohol use of about 6.0 standard drinks of alcohol per week. She reports that she does not use drugs.  She has a current medication list which includes the following prescription(s): ciprofloxacin hcl, esomeprazole, fluticasone propionate, hydroxychloroquine, metformin, metronidazole, naltrexone hcl, oxycodone-acetaminophen, sertraline, valacyclovir, zolpidem, epinephrine, and nystatin-triamcinolone.  Current Outpatient Medications on File Prior to Visit   Medication Sig Dispense Refill    ciprofloxacin HCl (CIPRO) 500 MG tablet Take 500 mg by mouth 2 (two) times daily.      esomeprazole (NEXIUM) 40 MG capsule Take 1 capsule (40 mg total) by mouth before breakfast. 90 capsule 1    fluticasone propionate (FLONASE) 50 mcg/actuation nasal spray 1 SPRAY BY EACH NOSTRIL ONCE DAILY 16 g 0    hydrOXYchloroQUINE (PLAQUENIL) 200 mg tablet Take 1 tablet (200 mg total) by mouth once daily. 90 tablet 3    metFORMIN (GLUCOPHAGE-XR) 500 MG ER 24hr tablet Take 1 tablet (500 mg total) by mouth 2 (two) times daily with meals. 180 tablet 3    metroNIDAZOLE (METROGEL) 0.75 % gel  AAA bid. For redness 45 g 3    natrexone tablet 4.5 mg Take 1 tablet (4.5 mg total) by mouth every evening. 90 tablet 3    oxyCODONE-acetaminophen (PERCOCET) 5-325 mg per tablet Take by mouth.      sertraline (ZOLOFT) 25 MG tablet Take 1 tablet (25 mg total) by mouth once daily. 90 tablet 1    valACYclovir (VALTREX) 1000 MG tablet Take 1,000 mg by mouth.      zolpidem (AMBIEN) 5 MG Tab Take 5 mg by mouth nightly as needed.      EPINEPHrine (EPIPEN) 0.3 mg/0.3 mL AtIn Inject 0.3 mLs (0.3 mg total) into the muscle once. If symptoms not improved repeat in about 5-15 minutes - inject a second dose (pen) of 0.3 mL into muscle once. for 1 dose 2 each 2    nystatin-triamcinolone (MYCOLOG II) cream Apply to affected area 2 times daily 30 g 3     No current facility-administered medications on file prior to visit.     She is allergic to doxycycline, shrimp, prednisone, and pcn [penicillins]..     Review of Systems   Respiratory: Negative.     All other systems reviewed and are negative.      Outpatient Encounter Medications as of 12/15/2023   Medication Sig Dispense Refill    ciprofloxacin HCl (CIPRO) 500 MG tablet Take 500 mg by mouth 2 (two) times daily.      esomeprazole (NEXIUM) 40 MG capsule Take 1 capsule (40 mg total) by mouth before breakfast. 90 capsule 1    fluticasone propionate (FLONASE) 50 mcg/actuation nasal spray 1 SPRAY BY EACH NOSTRIL ONCE DAILY 16 g 0    hydrOXYchloroQUINE (PLAQUENIL) 200 mg tablet Take 1 tablet (200 mg total) by mouth once daily. 90 tablet 3    metFORMIN (GLUCOPHAGE-XR) 500 MG ER 24hr tablet Take 1 tablet (500 mg total) by mouth 2 (two) times daily with meals. 180 tablet 3    metroNIDAZOLE (METROGEL) 0.75 % gel AAA bid. For redness 45 g 3    natrexone tablet 4.5 mg Take 1 tablet (4.5 mg total) by mouth every evening. 90 tablet 3    oxyCODONE-acetaminophen (PERCOCET) 5-325 mg per tablet Take by mouth.      sertraline (ZOLOFT) 25 MG tablet Take 1 tablet (25 mg total) by mouth once daily. 90  "tablet 1    valACYclovir (VALTREX) 1000 MG tablet Take 1,000 mg by mouth.      zolpidem (AMBIEN) 5 MG Tab Take 5 mg by mouth nightly as needed.      EPINEPHrine (EPIPEN) 0.3 mg/0.3 mL AtIn Inject 0.3 mLs (0.3 mg total) into the muscle once. If symptoms not improved repeat in about 5-15 minutes - inject a second dose (pen) of 0.3 mL into muscle once. for 1 dose 2 each 2    nystatin-triamcinolone (MYCOLOG II) cream Apply to affected area 2 times daily 30 g 3     No facility-administered encounter medications on file as of 12/15/2023.       Objective:     Vital Signs (Most Recent)  Vital Signs  Pulse: 83  Resp: 16  SpO2: 98 %  BP: 126/70  Height and Weight  Height: 5' 7" (170.2 cm)  Weight: 93.4 kg (205 lb 14.6 oz)  BSA (Calculated - sq m): 2.1 sq meters  BMI (Calculated): 32.2  Weight in (lb) to have BMI = 25: 159.3]  Wt Readings from Last 2 Encounters:   12/15/23 93.4 kg (205 lb 14.6 oz)   11/01/23 93.5 kg (206 lb 2.1 oz)       Physical Exam   Constitutional: She is oriented to person, place, and time. She appears well-developed and well-nourished.   HENT:   Head: Normocephalic.   Mouth/Throat: Mallampati Score: II.   Neck: No JVD present.   Cardiovascular: Normal rate and intact distal pulses.   No murmur heard.  Pulmonary/Chest: Normal expansion, effort normal and breath sounds normal.   Abdominal: Soft. Bowel sounds are normal.   Musculoskeletal:         General: No edema. Normal range of motion.      Cervical back: Normal range of motion and neck supple.   Lymphadenopathy:     She has no axillary adenopathy.   Neurological: She is alert and oriented to person, place, and time.   Skin: Skin is warm and dry.   Psychiatric: She has a normal mood and affect.   Nursing note and vitals reviewed.      Laboratory  Lab Results   Component Value Date    WBC 4.83 11/01/2023    RBC 4.24 11/01/2023    HGB 13.0 11/01/2023    HCT 39.7 11/01/2023    MCV 94 11/01/2023    MCH 30.7 11/01/2023    MCHC 32.7 11/01/2023    RDW 13.0 " "11/01/2023     11/01/2023    MPV 8.7 (L) 11/01/2023    GRAN 2.7 11/01/2023    GRAN 56.8 11/01/2023    LYMPH 1.5 11/01/2023    LYMPH 31.1 11/01/2023    MONO 0.5 11/01/2023    MONO 9.9 11/01/2023    EOS 0.1 11/01/2023    BASO 0.04 11/01/2023    EOSINOPHIL 1.2 11/01/2023    BASOPHIL 0.8 11/01/2023       BMP  Lab Results   Component Value Date     11/01/2023    K 3.6 11/01/2023     11/01/2023    CO2 22 (L) 11/01/2023    BUN 13 11/01/2023    CREATININE 0.9 11/01/2023    CALCIUM 9.2 11/01/2023    ANIONGAP 12 11/01/2023    ESTGFRAFRICA >60 04/19/2022    EGFRNONAA >60 04/19/2022    AST 22 03/28/2023    ALT 21 03/28/2023    PROT 8.1 03/28/2023       Lab Results   Component Value Date    BNP 49 07/31/2015       Lab Results   Component Value Date    TSH 1.859 11/01/2023       Lab Results   Component Value Date    SEDRATE 20 04/25/2023       Lab Results   Component Value Date    CRP 5.1 04/25/2023     Lab Results   Component Value Date    IGE <35 10/25/2022    IGE <35 02/26/2021        Lab Results   Component Value Date    ASPERGILLUS <0.10 10/25/2022     Lab Results   Component Value Date    AFUMIGATUSCL CLASS 0 10/25/2022        No results found for: "ACE"     Diagnostic Results:  I have personally reviewed today the following studies:    CT Chest Without Contrast  Narrative: EXAM: CT CHEST WITHOUT CONTRAST    HISTORY: Other nonspecific findings of lung fields.    TECHNIQUE: Noncontrast CT scan of through the chest performed.    COMPARISON: Comparison 12/13/2022.    FINDINGS: Stable small bilateral pulmonary nodules noted measuring up to 5 mm in diameter in the subpleural left lower lobe.  No new nodules or significant change from prior examination.  No pleural fluid is identified.  No pleural masses.  No mediastinal, hilar, or axillary adenopathy.  No significant coronary calcifications are identified.  No significant osseous abnormality is identified.  Limited evaluation of the upper abdomen is " unremarkable.  Impression:  Stable small bilateral pulmonary nodules measuring up to 5 mm in diameter compared to 2022.  No additional follow-up is required.    All CT scans at [this location] are performed using dose modulation techniques as appropriate to a performed exam including the following:  Automated exposure control; adjustment of the mA and/or kV according to patient size (this includes techniques or standardized protocols for targeted exams where dose is matched to indication / reason for exam; i.e. extremities or head); use of iterative reconstruction technique.    Finalized on: 12/15/2023 9:47 AM By:  Boby Ramírez MD  [facility]# [id number]      2023-12-15 09:50:01.457    [facility]   2023 - 2023  Patient ID: [id number]  : 1975  Age: 48 years  [facility]  [address] [facility], [address]  Compliance Report  Compliance  Payor Standard  Usage 2023 - 2023  Usage days 10/30 days (33%)  >= 4 hours 5 days (17%)  < 4 hours 5 days (17%)  Usage hours 43 hours 5 minutes  Average usage (total days) 1 hours 26 minutes  Average usage (days used) 4 hours 19 minutes  Median usage (days used) 4 hours 0 minutes  Total used hours (value since last reset - 2023) 500 hours  AirSense 11 AutoSet  Serial number [id number]  Mode CPAP  Set pressure 6 cmH2O  EPR Fulltime  EPR level 3  Therapy  Leaks - L/min Median: 7.7 95th percentile: 22.2 Maximum: 30.7  Events per hour AI: 1.6 HI: 0.4 AHI: 2.0  Apnea Index Central: 0.3 Obstructive: 1.1 Unknown: 0.0  RERA Index 0.3                Assessment/Plan:     Problem List Items Addressed This Visit       Sjogren's syndrome with keratoconjunctivitis sicca     On Plaquenil, natrexone,          Multiple pulmonary nodules     Imaging reviewed    3mm and 5 mm nodule    STABLE  LOW RISK  No additional F/u         Relevant Orders    CT Chest Without Contrast    Obesity (BMI 30.0-34.9)     Patient would benefit from weight loss and has tried  to set realistic goals to achieve success. Lifestyle changes were discussed on eating healthy, exercising at least 150 minutes weekly, and reducing sedentary behavior.   Discussed the risk factors associated with obesity: Arthritis/SIENA/Diabetes/Fatty Liver/Cardiovascular disease/GERD/HTN/HLP.          SIENA on CPAP - Primary     Data 11/13/2023 to 12/12/2023  Missed 20 days  CPAP 6 cm  AHI 2.0  Usage > 4 hrs was 17%    Patient wants to explore INSPIRE         Relevant Orders    CPAP/BIPAP SUPPLIES    Ambulatory referral/consult to ENT    Insomnia     Recent face lift, liposuction  Was given Ambien and percocet           No further ct chest surveillance needed    Follow up in about 1 year (around 12/15/2024), or ref ENT, weight loss, CPAp supplies, Chest CT.    This note was prepared using voice recognition system and is likely to have sound alike errors that may have been overlooked even after proof reading.  Please call me with any questions    Discussed diagnosis, its evaluation, treatment and usual course. All questions answered.      Thom Velazquez MD

## 2023-12-15 NOTE — PATIENT INSTRUCTIONS
Indications/  Contraindications  Inspire therapy is indicated for patients with the following characteristics:  18 years of age or older  Have moderate to severe SIENA (AHI range from 15-65 with <25% central apneas)  Unable to use CPAP  Free of complete concentric collapse at the palate  MRI Guidelines for Inspire Therapy: Click Here

## 2023-12-15 NOTE — ASSESSMENT & PLAN NOTE
Data 11/13/2023 to 12/12/2023  Missed 20 days  CPAP 6 cm  AHI 2.0  Usage > 4 hrs was 17%    Patient wants to explore INSPIRE

## 2023-12-15 NOTE — ASSESSMENT & PLAN NOTE
Patient would benefit from weight loss and has tried to set realistic goals to achieve success. Lifestyle changes were discussed on eating healthy, exercising at least 150 minutes weekly, and reducing sedentary behavior.   Discussed the risk factors associated with obesity: Arthritis/SIENA/Diabetes/Fatty Liver/Cardiovascular disease/GERD/HTN/HLP.

## 2024-03-28 ENCOUNTER — PATIENT MESSAGE (OUTPATIENT)
Dept: INTERNAL MEDICINE | Facility: CLINIC | Age: 49
End: 2024-03-28

## 2024-04-22 ENCOUNTER — PATIENT MESSAGE (OUTPATIENT)
Dept: RESEARCH | Facility: HOSPITAL | Age: 49
End: 2024-04-22
Payer: COMMERCIAL

## 2024-04-26 DIAGNOSIS — Z00.6 RESEARCH SUBJECT: ICD-10-CM

## 2024-04-26 DIAGNOSIS — M35.00 SJOGREN'S SYNDROME, WITH UNSPECIFIED ORGAN INVOLVEMENT: Primary | ICD-10-CM

## 2024-05-22 ENCOUNTER — RESEARCH ENCOUNTER (OUTPATIENT)
Dept: RESEARCH | Facility: HOSPITAL | Age: 49
End: 2024-05-22
Attending: STUDENT IN AN ORGANIZED HEALTH CARE EDUCATION/TRAINING PROGRAM
Payer: COMMERCIAL

## 2024-05-22 ENCOUNTER — OFFICE VISIT (OUTPATIENT)
Dept: RHEUMATOLOGY | Facility: CLINIC | Age: 49
End: 2024-05-22
Attending: STUDENT IN AN ORGANIZED HEALTH CARE EDUCATION/TRAINING PROGRAM
Payer: COMMERCIAL

## 2024-05-22 ENCOUNTER — HOSPITAL ENCOUNTER (OUTPATIENT)
Dept: CARDIOLOGY | Facility: HOSPITAL | Age: 49
Discharge: HOME | End: 2024-05-22
Attending: STUDENT IN AN ORGANIZED HEALTH CARE EDUCATION/TRAINING PROGRAM
Payer: COMMERCIAL

## 2024-05-22 ENCOUNTER — HOSPITAL ENCOUNTER (OUTPATIENT)
Dept: RADIOLOGY | Facility: HOSPITAL | Age: 49
Discharge: HOME | End: 2024-05-22
Attending: STUDENT IN AN ORGANIZED HEALTH CARE EDUCATION/TRAINING PROGRAM
Payer: COMMERCIAL

## 2024-05-22 VITALS
RESPIRATION RATE: 16 BRPM | HEIGHT: 68 IN | TEMPERATURE: 98 F | SYSTOLIC BLOOD PRESSURE: 144 MMHG | DIASTOLIC BLOOD PRESSURE: 91 MMHG | WEIGHT: 207.44 LBS | BODY MASS INDEX: 31.44 KG/M2 | HEART RATE: 75 BPM

## 2024-05-22 DIAGNOSIS — Z00.6 RESEARCH SUBJECT: ICD-10-CM

## 2024-05-22 DIAGNOSIS — M35.00 SJOGREN'S SYNDROME, WITH UNSPECIFIED ORGAN INVOLVEMENT: ICD-10-CM

## 2024-05-22 PROBLEM — D84.821 DRUG-INDUCED IMMUNODEFICIENCY: Status: RESOLVED | Noted: 2023-11-01 | Resolved: 2024-05-22

## 2024-05-22 PROBLEM — Z79.899 DRUG-INDUCED IMMUNODEFICIENCY: Status: RESOLVED | Noted: 2023-11-01 | Resolved: 2024-05-22

## 2024-05-22 PROCEDURE — 3008F BODY MASS INDEX DOCD: CPT | Mod: CPTII,S$GLB,, | Performed by: STUDENT IN AN ORGANIZED HEALTH CARE EDUCATION/TRAINING PROGRAM

## 2024-05-22 PROCEDURE — 93005 ELECTROCARDIOGRAM TRACING: CPT

## 2024-05-22 PROCEDURE — 71046 X-RAY EXAM CHEST 2 VIEWS: CPT | Mod: TC

## 2024-05-22 PROCEDURE — 99999 PR PBB SHADOW E&M-EST. PATIENT-LVL IV: CPT | Mod: PBBFAC,,, | Performed by: STUDENT IN AN ORGANIZED HEALTH CARE EDUCATION/TRAINING PROGRAM

## 2024-05-22 PROCEDURE — 1159F MED LIST DOCD IN RCRD: CPT | Mod: CPTII,S$GLB,, | Performed by: STUDENT IN AN ORGANIZED HEALTH CARE EDUCATION/TRAINING PROGRAM

## 2024-05-22 PROCEDURE — 93010 ELECTROCARDIOGRAM REPORT: CPT | Mod: ,,, | Performed by: INTERNAL MEDICINE

## 2024-05-22 PROCEDURE — 3080F DIAST BP >= 90 MM HG: CPT | Mod: CPTII,S$GLB,, | Performed by: STUDENT IN AN ORGANIZED HEALTH CARE EDUCATION/TRAINING PROGRAM

## 2024-05-22 PROCEDURE — 71046 X-RAY EXAM CHEST 2 VIEWS: CPT | Mod: 26,,, | Performed by: RADIOLOGY

## 2024-05-22 PROCEDURE — 3077F SYST BP >= 140 MM HG: CPT | Mod: CPTII,S$GLB,, | Performed by: STUDENT IN AN ORGANIZED HEALTH CARE EDUCATION/TRAINING PROGRAM

## 2024-05-22 PROCEDURE — 1160F RVW MEDS BY RX/DR IN RCRD: CPT | Mod: CPTII,S$GLB,, | Performed by: STUDENT IN AN ORGANIZED HEALTH CARE EDUCATION/TRAINING PROGRAM

## 2024-05-22 PROCEDURE — 99215 OFFICE O/P EST HI 40 MIN: CPT | Mod: S$GLB,,, | Performed by: STUDENT IN AN ORGANIZED HEALTH CARE EDUCATION/TRAINING PROGRAM

## 2024-05-22 NOTE — PROGRESS NOTES
RHEUMATOLOGY CLINIC ESTABLISHED PATIENT VISIT  Sponsor: [employer]   Title: [id number]  IRB #: [id number]  : Neil Morales   Site: [id number]  Others Present: N/A  Is LAR consenting for subject: No     Pt No: [id number]    Reason for consult:- Sjogren's syndrome; research visit    Chief complaints, HPI, ROS, EXAM, Assessment & Plans:-    Olesya Tay is a 49 y.o. pleasant female who presents to follow-up from her previous visit April 2023 for management of Sjogren's.  Has been taking her hydroxychloroquine daily although somewhat inconsistent with taking.  Does note that she has had some good improvement in her fatigue and arthralgias since starting low-dose naltrexone.  Does note some swelling of her submandibular glands.  Does continue to have some fatigue and brain fog.  Continues to have arthralgias and some morning stiffness.  Some difficulty with sleep due to her sleep apnea.  Rheumatologic review of systems otherwise negative.  No synovitis, dactylitis or enthesitis.  Palpable swelling of submandibular glands.  No significant tenderness.    ESSDAI completed today.  See research documents.    Reviewed all available old and outside pertinent medical records.    All lab results personally reviewed and interpreted by me.    1. Research subject    2. Sjogren's syndrome, with unspecified organ involvement        Problem List Items Addressed This Visit    None  Visit Diagnoses       Research subject        Sjogren's syndrome, with unspecified organ involvement                Patient following up today for management of Sjogren's disease  Positive SEAMUS, positive SSA, positive SSB, positive rheumatoid factor with dryness  Currently on hydroxychloroquine 200 mg most days  Would continue daily dosing of hydroxychloroquine 200 mg  Continue low-dose naltrexone  Research visit today for screening for enrollment in BMS Sjogren's study (Deucravicitinib versus placebo)  See separate research  documents    # No follow-ups on file.    Chronic comorbid conditions affecting medical decision making today:    Past Medical History:   Diagnosis Date    Anemia     Constipation     Depression     Endometriosis of pelvis     GERD (gastroesophageal reflux disease)     Gestational diabetes mellitus in pregnancy     Heart murmur     Lung nodule     Osteoporosis     Sjogren's syndrome        Past Surgical History:   Procedure Laterality Date    COSMETIC SURGERY          Social History     Tobacco Use    Smoking status: Never    Smokeless tobacco: Never   Substance Use Topics    Alcohol use: Yes     Alcohol/week: 6.0 standard drinks of alcohol     Types: 6 Glasses of wine per week     Comment: socially    Drug use: Never       Family History   Problem Relation Name Age of Onset    Early death Mother Lali 52        hepatitis c    Heart disease Maternal Grandfather      Cancer Paternal Grandmother aHng         stomach    Arthritis Maternal Aunt Barbara         rheumatoid    Rheum arthritis Maternal Aunt Barbara     Osteoarthritis Maternal Aunt Barbara     Breast cancer Paternal Aunt Lisa Jj     Stroke Paternal Grandfather Don        Review of patient's allergies indicates:   Allergen Reactions    Doxycycline Shortness Of Breath     Elevated BP and shortness of breath    Shrimp Swelling    Prednisone Hives     Hives after taking medrol dose pack    Pcn [penicillins] Rash       Medication List with Changes/Refills   Current Medications    CIPROFLOXACIN HCL (CIPRO) 500 MG TABLET    Take 500 mg by mouth 2 (two) times daily.    EPINEPHRINE (EPIPEN) 0.3 MG/0.3 ML ATIN    Inject 0.3 mLs (0.3 mg total) into the muscle once. If symptoms not improved repeat in about 5-15 minutes - inject a second dose (pen) of 0.3 mL into muscle once. for 1 dose    ESOMEPRAZOLE (NEXIUM) 40 MG CAPSULE    Take 1 capsule (40 mg total) by mouth before breakfast.    ESTRADIOL (ESTRACE) 0.01 % (0.1 MG/GRAM) VAGINAL CREAM    Place 1 g vaginally once  daily. For 14 days then apply 1 gram twice weekly    FLUTICASONE PROPIONATE (FLONASE) 50 MCG/ACTUATION NASAL SPRAY    1 SPRAY BY EACH NOSTRIL ONCE DAILY    HYDROXYCHLOROQUINE (PLAQUENIL) 200 MG TABLET    Take 1 tablet (200 mg total) by mouth once daily.    METFORMIN (GLUCOPHAGE-XR) 500 MG ER 24HR TABLET    Take 1 tablet (500 mg total) by mouth 2 (two) times daily with meals.    METRONIDAZOLE (METROGEL) 0.75 % GEL    AAA bid. For redness    NATREXONE TABLET 4.5 MG    Take 1 tablet (4.5 mg total) by mouth every evening.    NYSTATIN-TRIAMCINOLONE (MYCOLOG II) CREAM    Apply to affected area 2 times daily    OXYCODONE-ACETAMINOPHEN (PERCOCET) 5-325 MG PER TABLET    Take by mouth.    SERTRALINE (ZOLOFT) 25 MG TABLET    Take 1 tablet (25 mg total) by mouth once daily.    VALACYCLOVIR (VALTREX) 1000 MG TABLET    Take 1,000 mg by mouth.    ZOLPIDEM (AMBIEN) 5 MG TAB    Take 5 mg by mouth nightly as needed.         Disclaimer: This note was prepared using voice recognition system and is likely to have sound alike errors and is not proofread.  Please message me with any questions.    32 minutes of total time spent on the encounter, which includes face to face time and non-face to face time preparing to see the patient (eg, review of tests), Obtaining and/or reviewing separately obtained history, Documenting clinical information in the electronic or other health record, Independently interpreting results (not separately reported) and communicating results to the patient/family/caregiver, or Care coordination (not separately reported).     Thank you for allowing me to participate in the care of Olesya Tay.    Manjinder Goodwin MD

## 2024-05-22 NOTE — PROGRESS NOTES
Visit: Screening   Sponsor: [employer]   Title: -9890  IRB #: [id number]  : Neil Morales   Site: 0017  Others Present: N/A  Is LAR consenting for subject: No     Pt No: [id number]  Informed Consent:      Consenting was completed in private area using the most current IRB approved version of the Informed Consent Form (ICF) by myself.  The patient was given ample time to review the consents prior to execution of the ICF.     Prior to the ICF being signed, or any study protocol required data collection, testing, procedure, or intervention being performed, the following was done and/or discussed:?   Patient was given a copy of the ICF for review: Yes     Purpose of the study and qualifications to participate: Yes   ?   Study design, follow up schedule, and tests or procedures done at each visit: Yes     Confidentiality and HIPAA Authorization for Release of Medical Records for the research trial/ subject's rights/research related injury: Yes     Risk, Benefits, Alternative Treatments, Compensation and Costs: Yes     Participation in the research trial is voluntary and patient may withdraw at anytime:  Yes     Contact information for study related questions: Yes        Patient verbalizes understanding of the above: Yes     Contact information for  PI/Sub-I and CRC given to patient:Yes      Patient able to adequately summarize: the purpose of the study, the risks associated with the study, and all procedures, testing, and follow-ups associated with the study: Yes        Olesya Tay signed the IRB approved version of the ICF.? All pages of the consents were reviewed with the patient, and all questions answered satisfactorily. The patient signed the paper consent form.      Patient consented to participate in genetic testing: Yes     Patient consented to participate in optional testing :   Lip Salivary Gland or Parotid Gland Biopsy: No     Blood for DNA: Yes      Optional Future  Research: No  Patient received a fully executed copy of same. The original consents were scanned into electronic medical records (EPIC).     Time of Consent Execution: 9:53 am     Medical History and Concomitant medications listed in the patient's electronic record were reviewed with the patient to ensure accuracy.    Tablet instructions were reviewed with patient ahead of training. Subject completed training on TrialMax- no questions at the time of completion. Device was dispensed - No   Patient Assessment Completed: Yes     Physical assessment completed: Yes           Vitals:      BP: 144/91  Pulse: 75  Temp: 97.9 F  Weight: 94.1 kg  Is Subject of Childbearing Potential: No  Patient was advised about the risk of pregnancy during the course of the study. Patient verbalized understanding of study visit procedures.   History of Alcohol: Yes      History of Tobacco use: No           Labs:     Labs were collected per protocol at 10:53 am. All labs were processed and shipped per requisition. Requisition number: [id number], [id number], [id number]  Quantiferon drawn: YES                   Date and time in incubator: 05/22/2024 at 1:30 pm                   Date and time out incubator: 05/23/2024 at 9:30 am     Additional Testing:  Salivary Flow Test performed: Yes                  Stimulated with paraffin: Yes Weight: 3.0 grams (Pre-sample collection vial: 2.27 grams)    Start Time: 11:07 am  Stop Time: 11:12 am:                  Unstimulated: Yes Weight: 2.6 grams (Pre-sample collection vial: 2.27 grams)    Start Time: 11:04am   Stop Time: 11:09 am  Schirmer's Test performed: Yes Start Time: 12:23 am Stop Time: 12:28 am                  Right: Was moisture strip saturated greater than 33mm?  No, 15 mm                     Left: Was moisture strip saturated greater than 33mm? No, 12 mm  Tear Breakup Evaluation: Subject to return for testing with ophthalmologist   Ocular Staining Score: Subject to return for testing with  ophthalmologist   Local EKG performed: Yes, results uploaded into EDC and filed in subject file  Local Chest X-ray performed: Yes,  results uploaded into EDC and filed in subject file  Finger Stick RNA performed: Yes, subject did agree to optional Blood for DNA     Screening Procedure Related Adverse Events Reported: Yes        End of Visit:     Patient was instructed that we will be in contact once eligibility is determined. Patient verbalized understanding. Patient confirmed receipt of the site personnel phone numbers should there be any need to get in touch with us.     Subject registered in OnCore.   [employer] payment? Yes

## 2024-05-23 LAB
OHS QRS DURATION: 90 MS
OHS QTC CALCULATION: 403 MS

## 2024-06-04 ENCOUNTER — OFFICE VISIT (OUTPATIENT)
Dept: OPHTHALMOLOGY | Facility: CLINIC | Age: 49
End: 2024-06-04
Payer: COMMERCIAL

## 2024-06-04 DIAGNOSIS — R76.8 RHEUMATOID FACTOR POSITIVE: ICD-10-CM

## 2024-06-04 DIAGNOSIS — M35.01 SJOGREN'S SYNDROME WITH KERATOCONJUNCTIVITIS SICCA: Primary | ICD-10-CM

## 2024-06-04 DIAGNOSIS — Z79.899 ENCOUNTER FOR LONG-TERM CURRENT USE OF HIGH RISK MEDICATION: ICD-10-CM

## 2024-06-04 PROCEDURE — 99999 PR PBB SHADOW E&M-EST. PATIENT-LVL III: CPT | Mod: PBBFAC,,, | Performed by: OPHTHALMOLOGY

## 2024-06-04 NOTE — PROGRESS NOTES
HPI     Dry Eye            Comments: Here for dry eye study               Comments    1. Sjogren's syndrome  2. KCS OU  3. Rosacea  4. Long term Plaquenil use             Last edited by Emily Simpson, Patient Care Assistant on 6/4/2024 11:25   AM.            Assessment /Plan     For exam results, see Encounter Report.      ICD-10-CM ICD-9-CM    1. Sjogren's syndrome with keratoconjunctivitis sicca  M35.01 710.2       2. Rheumatoid factor positive  R76.8 795.79       3. Encounter for long-term current use of high risk medication  Z79.899 V58.69           Protocol: [facility] -0742  Ophthalmology : Juan Diego Sahni MD    Appendix 4 - Ocular Assessments Worksheet    Schirmer's Test    Start and stop time should be recorded and based on a 5-minute period for testing.     Sample Collection Start Time: 12:00 pm  Sample Collection Stop Time: 12:05 pm    Measurements (mm)       Right Eye Left Eye   Leading edge (mm) 12 25   Lagging edge (mm) 10 22   Median value 11 23.5   Was moisture strip saturated (>35 mm)? Yes/No No  No      Tear Break-up Test            Right Eye Left Eye   Test #1 (seconds) 7 4    Test 2 (seconds)  4 7   Test #3 (seconds) 7 7   Result of Ophthalmological Examination (seconds; TBUT is the average of 3 scores) 6 6   Is debris present? Yes/No Yes  No        Wilfredo Rice Score:     Right Eye Left Eye   Medial/Nasal Conjunctiva Grade (0 to 3) 1 1   Lateral/Temporal Conjunctiva Grade (0 to 3) 0 0   Cornea Grade (0 to 3) 0 2   Total score (0 to 9) 1 3       SICCA Ocular Staining Score     Right Eye Left Eye   Corneal Grade (0 to 3) 0 2   Confluency   ( 0 or 1 ) 0 0   Pupillary staining ( 0 or 1)  0 1   Filament staining (0 or 1) 0 0   Conjunctiva temporal (0 to 3) 0 0   Conjunctiva nasal (0 to 3) 1 1   Total score (0 to 12) 1 4

## 2024-06-05 ENCOUNTER — OFFICE VISIT (OUTPATIENT)
Dept: RHEUMATOLOGY | Facility: CLINIC | Age: 49
End: 2024-06-05
Payer: COMMERCIAL

## 2024-06-05 ENCOUNTER — DOCUMENTATION ONLY (OUTPATIENT)
Dept: RESEARCH | Facility: HOSPITAL | Age: 49
End: 2024-06-05
Payer: COMMERCIAL

## 2024-06-05 DIAGNOSIS — Z79.899 LONG-TERM USE OF PLAQUENIL: ICD-10-CM

## 2024-06-05 DIAGNOSIS — Z00.6 RESEARCH SUBJECT: Primary | ICD-10-CM

## 2024-06-05 DIAGNOSIS — M35.01 SJOGREN'S SYNDROME WITH KERATOCONJUNCTIVITIS SICCA: ICD-10-CM

## 2024-06-05 DIAGNOSIS — M35.00 SJOGREN'S SYNDROME, WITH UNSPECIFIED ORGAN INVOLVEMENT: ICD-10-CM

## 2024-06-05 PROCEDURE — 99999 PR PBB SHADOW E&M-EST. PATIENT-LVL II: CPT | Mod: PBBFAC,,, | Performed by: STUDENT IN AN ORGANIZED HEALTH CARE EDUCATION/TRAINING PROGRAM

## 2024-06-05 NOTE — PROGRESS NOTES
Visit: Run-in  Sponsor: [employer]   Title: -9260  IRB #: [id number]  : Neil Rubiomarisol   Site: 0017  Others Present: No  Is LAR consenting for subject: No  Pt No:[id number]        Tablet instructions were reviewed with patient ahead of training. Subject completed training on TrialMax Touch- no questions at the time of completion.  Patient was dispensend eCOA TOUCH device and on 05 June 2024. Patient agreed to complete daily diary entries. S/N: [id number].     Patient confirmed receipt of site personnel phone numbers should there be any need to get in touch with us.

## 2024-06-06 ENCOUNTER — PATIENT MESSAGE (OUTPATIENT)
Dept: RESEARCH | Facility: HOSPITAL | Age: 49
End: 2024-06-06
Payer: COMMERCIAL

## 2024-06-06 ENCOUNTER — LAB VISIT (OUTPATIENT)
Dept: LAB | Facility: HOSPITAL | Age: 49
End: 2024-06-06
Attending: STUDENT IN AN ORGANIZED HEALTH CARE EDUCATION/TRAINING PROGRAM
Payer: COMMERCIAL

## 2024-06-06 DIAGNOSIS — Z00.6 RESEARCH SUBJECT: Primary | ICD-10-CM

## 2024-06-06 DIAGNOSIS — Z00.6 RESEARCH SUBJECT: ICD-10-CM

## 2024-06-06 PROCEDURE — 36415 COLL VENOUS BLD VENIPUNCTURE: CPT | Performed by: STUDENT IN AN ORGANIZED HEALTH CARE EDUCATION/TRAINING PROGRAM

## 2024-06-09 NOTE — PROGRESS NOTES
RHEUMATOLOGY CLINIC ESTABLISHED PATIENT VISIT    Sponsor: [employer]   Title: -3473  IRB #: [id number]  : Neil Morales   Site: 0017  Others Present: N/A  Is LAR consenting for subject: No     Pt No: [id number]    Reason for consult:- Sjogren's syndrome; recently subject    Chief complaints, HPI, ROS, EXAM, Assessment & Plans:-    Olesya Tay is a 49 y.o. pleasant female who presents to follow up for research visit today.  Currently in clinical trial for Sjogren's syndrome.  Twenty-eight joint count performed today.  See research documentation.  No new symptoms in the interim.  Rheumatologic review of systems otherwise negative.      Reviewed all available old and outside pertinent medical records.    All lab results personally reviewed and interpreted by me.    1. Research subject    2. Sjogren's syndrome, with unspecified organ involvement    3. Sjogren's syndrome with keratoconjunctivitis sicca    4. Long-term use of Plaquenil        Problem List Items Addressed This Visit          Ophtho    Sjogren's syndrome with keratoconjunctivitis sicca     Other Visit Diagnoses       Research subject    -  Primary    Sjogren's syndrome, with unspecified organ involvement        Long-term use of Plaquenil                Patient following up today for management of Sjogren's disease  Positive SEAMUS, positive SSA, positive SSB, positive rheumatoid factor with dryness  Currently on hydroxychloroquine 200 mg most days  Would continue daily dosing of hydroxychloroquine 200 mg  Continue low-dose naltrexone  Research visit today for 28 joint count    # No follow-ups on file.    Chronic comorbid conditions affecting medical decision making today:    Past Medical History:   Diagnosis Date    Anemia     Constipation     Depression     Endometriosis of pelvis     GERD (gastroesophageal reflux disease)     Gestational diabetes mellitus in pregnancy     Heart murmur     Lung nodule      Osteoporosis     Sjogren's syndrome        Past Surgical History:   Procedure Laterality Date    COSMETIC SURGERY          Social History     Tobacco Use    Smoking status: Never    Smokeless tobacco: Never   Substance Use Topics    Alcohol use: Yes     Alcohol/week: 6.0 standard drinks of alcohol     Types: 6 Glasses of wine per week     Comment: socially    Drug use: Never       Family History   Problem Relation Name Age of Onset    Diabetes Mother Lali     Cataracts Mother Lali     Early death Mother Lali 52        hepatitis c    Arthritis Maternal Aunt Barbara         rheumatoid    Rheum arthritis Maternal Aunt Barbara     Osteoarthritis Maternal Aunt Barbara     Breast cancer Paternal Aunt Lisa Jj     Heart disease Maternal Grandfather      Macular degeneration Paternal Grandmother Hang     Cancer Paternal Grandmother Hang         stomach    Stroke Paternal Grandfather Don        Review of patient's allergies indicates:   Allergen Reactions    Doxycycline Shortness Of Breath     Elevated BP and shortness of breath    Shrimp Swelling    Prednisone Hives     Hives after taking medrol dose pack    Pcn [penicillins] Rash       Medication List with Changes/Refills   Current Medications    CIPROFLOXACIN HCL (CIPRO) 500 MG TABLET    Take 500 mg by mouth 2 (two) times daily.    EPINEPHRINE (EPIPEN) 0.3 MG/0.3 ML ATIN    Inject 0.3 mLs (0.3 mg total) into the muscle once. If symptoms not improved repeat in about 5-15 minutes - inject a second dose (pen) of 0.3 mL into muscle once. for 1 dose    ESOMEPRAZOLE (NEXIUM) 40 MG CAPSULE    Take 1 capsule (40 mg total) by mouth before breakfast.    ESTRADIOL (ESTRACE) 0.01 % (0.1 MG/GRAM) VAGINAL CREAM    Place 1 g vaginally once daily. For 14 days then apply 1 gram twice weekly    FLUTICASONE PROPIONATE (FLONASE) 50 MCG/ACTUATION NASAL SPRAY    1 SPRAY BY EACH NOSTRIL ONCE DAILY    HYDROXYCHLOROQUINE (PLAQUENIL) 200 MG TABLET    Take 1 tablet (200 mg total) by mouth once daily.     METFORMIN (GLUCOPHAGE-XR) 500 MG ER 24HR TABLET    Take 1 tablet (500 mg total) by mouth 2 (two) times daily with meals.    METRONIDAZOLE (METROGEL) 0.75 % GEL    AAA bid. For redness    NATREXONE TABLET 4.5 MG    Take 1 tablet (4.5 mg total) by mouth every evening.    NYSTATIN-TRIAMCINOLONE (MYCOLOG II) CREAM    Apply to affected area 2 times daily    OXYCODONE-ACETAMINOPHEN (PERCOCET) 5-325 MG PER TABLET    Take by mouth.    SERTRALINE (ZOLOFT) 25 MG TABLET    Take 1 tablet (25 mg total) by mouth once daily.    VALACYCLOVIR (VALTREX) 1000 MG TABLET    Take 1,000 mg by mouth.    ZOLPIDEM (AMBIEN) 5 MG TAB    Take 5 mg by mouth nightly as needed.         Disclaimer: This note was prepared using voice recognition system and is likely to have sound alike errors and is not proofread.  Please message me with any questions.    32 minutes of total time spent on the encounter, which includes face to face time and non-face to face time preparing to see the patient (eg, review of tests), Obtaining and/or reviewing separately obtained history, Documenting clinical information in the electronic or other health record, Independently interpreting results (not separately reported) and communicating results to the patient/family/caregiver, or Care coordination (not separately reported).     Thank you for allowing me to participate in the care of Olesya Tay.    Manjinder Goodwin MD

## 2024-06-11 ENCOUNTER — TELEPHONE (OUTPATIENT)
Dept: RHEUMATOLOGY | Facility: CLINIC | Age: 49
End: 2024-06-11
Payer: COMMERCIAL

## 2024-06-11 DIAGNOSIS — M35.00 SJOGREN'S SYNDROME, WITH UNSPECIFIED ORGAN INVOLVEMENT: ICD-10-CM

## 2024-06-11 DIAGNOSIS — Z00.6 RESEARCH SUBJECT: Primary | ICD-10-CM

## 2024-06-11 DIAGNOSIS — R05.3 CHRONIC COUGH: Primary | ICD-10-CM

## 2024-06-11 LAB
DRUG STUDY TEST NAME: NORMAL
DRUG STUDY TEST RESULT: NORMAL

## 2024-06-11 NOTE — TELEPHONE ENCOUNTER
dsDNA in our system is negative and patient does not satisfy classification criteria for lupus even with positive dsDNA.   CT chest dated 12/15/2023 showed no evidence of ILD. Repeat if needed by research.

## 2024-06-11 NOTE — TELEPHONE ENCOUNTER
----- Message from Kenn Contreras RN sent at 6/11/2024  2:01 PM CDT -----  Regarding: RE: BMS Oral Sjogrens - Rain Jasvir [id number]  Hi Dr. NGUYEN,  I sent the original message regarding the patient above. Please advise.    Kenn Albarran  ----- Message -----  From: Neil Morales MD  Sent: 6/11/2024   1:41 PM CDT  To: Kenn Contreras RN; Laurie Melendez  Subject: RE: BMS Oral Sjogrens - Rain Jasvir [id number]        ----- Message -----  From: Kenn Contreras RN  Sent: 6/11/2024   1:37 PM CDT  To: Neil Morales MD; Laurie Melendez  Subject: BMS Oral Sjogrens - Rain Jasvir [id number]          Anderson NGUYEN,  We received some queries from the sponsor regarding the above mentioned patient. They are asking the investigator to verify if Lupus had been considered given her anti-dsDNA is 14.    They are also asking for CT scan or PFT due to chronic cough to evaluate if there is any bronchial involvement.      Please advise.

## 2024-06-20 ENCOUNTER — DOCUMENTATION ONLY (OUTPATIENT)
Dept: RESEARCH | Facility: HOSPITAL | Age: 49
End: 2024-06-20
Payer: COMMERCIAL

## 2024-06-20 ENCOUNTER — CLINICAL SUPPORT (OUTPATIENT)
Dept: PULMONOLOGY | Facility: CLINIC | Age: 49
End: 2024-06-20
Payer: COMMERCIAL

## 2024-06-20 DIAGNOSIS — M35.00 SJOGREN'S SYNDROME, WITH UNSPECIFIED ORGAN INVOLVEMENT: ICD-10-CM

## 2024-06-20 DIAGNOSIS — Z00.6 RESEARCH SUBJECT: ICD-10-CM

## 2024-06-20 LAB
BRPFT: NORMAL
FEF 25 75 LLN: 1.64
FEF 25 75 PRE REF: 115.4 %
FEF 25 75 REF: 2.92
FEV1 FVC LLN: 69
FEV1 FVC PRE REF: 102.6 %
FEV1 FVC REF: 80
FEV1 LLN: 2.39
FEV1 PRE REF: 96.1 %
FEV1 REF: 3.07
FVC LLN: 3.02
FVC PRE REF: 93 %
FVC REF: 3.86
PEF LLN: 5.37
PEF PRE REF: 96.7 %
PEF REF: 7.26
PRE FEF 25 75: 3.37 L/S
PRE FET 100: 8.22 SEC
PRE FEV1 FVC: 82.13 %
PRE FEV1: 2.95 L
PRE FVC: 3.59 L
PRE PEF: 7.02 L/S

## 2024-06-24 DIAGNOSIS — Z00.6 RESEARCH SUBJECT: ICD-10-CM

## 2024-06-24 DIAGNOSIS — M35.00 SJOGREN'S SYNDROME, WITH UNSPECIFIED ORGAN INVOLVEMENT: Primary | ICD-10-CM

## 2024-06-25 ENCOUNTER — HOSPITAL ENCOUNTER (OUTPATIENT)
Dept: CARDIOLOGY | Facility: HOSPITAL | Age: 49
Discharge: HOME | End: 2024-06-25
Payer: COMMERCIAL

## 2024-06-25 ENCOUNTER — OFFICE VISIT (OUTPATIENT)
Dept: RHEUMATOLOGY | Facility: CLINIC | Age: 49
End: 2024-06-25
Payer: COMMERCIAL

## 2024-06-25 ENCOUNTER — RESEARCH ENCOUNTER (OUTPATIENT)
Dept: RESEARCH | Facility: HOSPITAL | Age: 49
End: 2024-06-25
Payer: COMMERCIAL

## 2024-06-25 VITALS
BODY MASS INDEX: 32.25 KG/M2 | SYSTOLIC BLOOD PRESSURE: 144 MMHG | DIASTOLIC BLOOD PRESSURE: 89 MMHG | WEIGHT: 209 LBS | TEMPERATURE: 98 F | HEART RATE: 95 BPM

## 2024-06-25 DIAGNOSIS — M35.00 SJOGREN'S SYNDROME, WITH UNSPECIFIED ORGAN INVOLVEMENT: ICD-10-CM

## 2024-06-25 DIAGNOSIS — Z00.6 RESEARCH SUBJECT: Primary | ICD-10-CM

## 2024-06-25 DIAGNOSIS — M35.01 SJOGREN'S SYNDROME WITH KERATOCONJUNCTIVITIS SICCA: ICD-10-CM

## 2024-06-25 DIAGNOSIS — Z00.6 RESEARCH SUBJECT: ICD-10-CM

## 2024-06-25 DIAGNOSIS — Z79.899 LONG-TERM USE OF PLAQUENIL: ICD-10-CM

## 2024-06-25 LAB
OHS QRS DURATION: 90 MS
OHS QTC CALCULATION: 403 MS

## 2024-06-25 PROCEDURE — 93005 ELECTROCARDIOGRAM TRACING: CPT

## 2024-06-25 NOTE — PROGRESS NOTES
Visit: Randomization   Sponsor: [employer]   Title: -2508  IRB #: [id number]  : Neil Morales   Site: 0017  Others Present: No  Is MADISON consenting for subject: Marilyn  Pt No:[id number]      Subject [id number]  was seen today at the [place] for Randomization per the above listed protocol. The randomization visit procedures were discussed with the patient. Subject verbalized understanding and wishes to continue study.     The visit was registered in IRT and all AE's and con meds were confirmed with the subject. Inclusion exlusion criteria was verified during the visit.         Vitals     BP: 144/89  HR: 94.8  Temp: 97.8 F  Wt: 94.8 kg      eCOA tablet compliance was reviewed, patient completed all required questionnaires. Investigator completed all required questionnaires - paper and electronic copies were completed.     Assessments  Saliva collection completed - unstimulated: Start: 10:36 am End: 10:41 am - 0.3 mL; stimulated: Start: 10:43 am End 10:48 am - 0. mL.     Labs were drawn at 11:00 am, processed, and shipped per protocol.      Urine was collected - no pregnancy test was performed as patient is post menopausal.      Urinalysis Dipstick ? Yes   THOMAS - 1(17)+  URO - Negative  KET - Negative   GLU - Negative   PRO - Negative  BLD - Negative  pH - 5.0  NIT - Negative  DANNY - Negative    SG - 1.030       Subject was dosed at 11:29 am and directly monitored for 30 minutes following administration per investigator. No adverse reactions reported. Subject advised to contact research staff immediately for any adverse effects and if adverse effects occur after hours to report immediately to the ER for treatment. Verbalized understanding.      Week 4 visit was scheduled with the subject. She has our contact numbers should she need to reach us prior to her next appointment.

## 2024-07-02 DIAGNOSIS — R41.3 MEMORY DEFICITS: ICD-10-CM

## 2024-07-02 DIAGNOSIS — R53.82 CHRONIC FATIGUE: ICD-10-CM

## 2024-07-02 DIAGNOSIS — M35.00 SJOGREN'S DISEASE: ICD-10-CM

## 2024-07-02 DIAGNOSIS — M35.01 SJOGREN'S SYNDROME WITH KERATOCONJUNCTIVITIS SICCA: ICD-10-CM

## 2024-07-02 RX ORDER — SERTRALINE HYDROCHLORIDE 25 MG/1
25 TABLET, FILM COATED ORAL DAILY
Qty: 90 TABLET | Refills: 1 | Status: SHIPPED | OUTPATIENT
Start: 2024-07-02

## 2024-07-02 RX ORDER — HYDROXYCHLOROQUINE SULFATE 200 MG/1
200 TABLET, FILM COATED ORAL DAILY
Qty: 90 TABLET | Refills: 3 | Status: SHIPPED | OUTPATIENT
Start: 2024-07-02

## 2024-07-02 RX ORDER — SERTRALINE HYDROCHLORIDE 25 MG/1
25 TABLET, FILM COATED ORAL
Qty: 90 TABLET | Refills: 1 | OUTPATIENT
Start: 2024-07-02

## 2024-07-02 NOTE — TELEPHONE ENCOUNTER
No care due was identified.  [facility] Embedded Care Due Messages. Reference number: [id number].   7/02/2024 2:22:44 PM CDT

## 2024-07-02 NOTE — TELEPHONE ENCOUNTER
Provider Staff:     Action is required for this patient.   Please see care gap opportunities below in Care Due Message.     Thanks!  [facility]     Appointments      Date Provider   Last Visit   11/1/2023 Beena Kwon MD   Next Visit   7/2/2024 Beena Kwon MD     Note composed:6:41 PM 07/02/2024

## 2024-07-02 NOTE — TELEPHONE ENCOUNTER
Care Due:                  Date            Visit Type   Department     Provider  --------------------------------------------------------------------------------                                MYCHART                              FOLLOWUP/OF  [facility] INTERNAL  Beena Doyle  Last Visit: 11-      [facility] VISIT   MEDICINE       Doyle  Next Visit: None Scheduled  None         None Found                                                            Last  Test          Frequency    Reason                     Performed    Due Date  --------------------------------------------------------------------------------    HBA1C.......  6 months...  metFORMIN................  11- 04-    [facility] Embedded Care Due Messages. Reference number: [id number].   7/02/2024 2:07:33 PM CDT

## 2024-07-02 NOTE — TELEPHONE ENCOUNTER
Provider Staff:  Action required for this patient    Requires labs      Please see care gap opportunities below in Care Due Message.    Thanks!  [facility]     Appointments      Date Provider   Last Visit   11/1/2023 Beena Kwon MD   Next Visit   11/1/2024 Beena Kwon MD     Refill Decision Note   Olesya Tay  is requesting a refill authorization.  Brief Assessment and Rationale for Refill:  Approve     Medication Therapy Plan:        Comments:     Note composed:6:31 PM 07/02/2024

## 2024-07-02 NOTE — TELEPHONE ENCOUNTER
Quick DC. Request already responded to by other means (e.g. phone or fax)   Refill Authorization Note   Olesya Jasvir  is requesting a refill authorization.  Brief Assessment and Rationale for Refill:  Quick Discontinue  Medication Therapy Plan:       Medication Reconciliation Completed:  No      Comments:     Note composed:6:39 PM 07/02/2024

## 2024-07-03 NOTE — PROGRESS NOTES
Late entry for visit that occured on 20JUN2024    Visit: UNSCHEDULED VISIT  Sponsor: [employer]  Title: -9386  IRB #: [id number]  : Neil Morales   Site: 0017  Others Present: NO  Pt No: [id number]     Assessment   Subject returns to clinic for Pulmonary Function Test per sponsor request. Results uploaded into the EDC and filed in the subject binder.      End of Visit:  Patient has the study-related emergency contacts.  Subject instructed that CRC will make contact once eligibility is determined. Patient verbalized understanding.     Visit registered in OnCore.   [employer] payment? Yes.

## 2024-07-07 NOTE — PROGRESS NOTES
RHEUMATOLOGY CLINIC ESTABLISHED PATIENT VISIT    Sponsor: [employer]   Title: -6868  IRB #: [id number]  : Neil Morales   Site: 0017  Others Present: N/A  Is LAR consenting for subject: No     Pt No: [id number]    Reason for consult:- Sjogren's syndrome; research subject    Chief complaints, HPI, ROS, EXAM, Assessment & Plans:-    Olesya Tay is a 49 y.o. pleasant female who presents to follow up for research visit today.  Currently in clinical trial for Sjogren's syndrome.  Twenty-eight joint count performed today.  See research documentation.  No new symptoms in the interim.  Rheumatologic review of systems otherwise negative.      Reviewed all available old and outside pertinent medical records.    All lab results personally reviewed and interpreted by me.    1. Research subject    2. Sjogren's syndrome, with unspecified organ involvement    3. Sjogren's syndrome with keratoconjunctivitis sicca    4. Long-term use of Plaquenil        Problem List Items Addressed This Visit          Ophtho    Sjogren's syndrome with keratoconjunctivitis sicca     Other Visit Diagnoses       Research subject    -  Primary    Sjogren's syndrome, with unspecified organ involvement        Long-term use of Plaquenil                Patient following up today for management of Sjogren's disease  Positive SEAMUS, positive SSA, positive SSB, positive rheumatoid factor with dryness  Currently on hydroxychloroquine 200 mg daily  Would continue daily dosing of hydroxychloroquine 200 mg  Continue low-dose naltrexone  Research visit today for 28 joint count  subject can proceed with Randomization    # No follow-ups on file.    Chronic comorbid conditions affecting medical decision making today:    Past Medical History:   Diagnosis Date    Anemia     Constipation     Depression     Endometriosis of pelvis     GERD (gastroesophageal reflux disease)     Gestational diabetes mellitus in pregnancy      Heart murmur     Lung nodule     Osteoporosis     Sjogren's syndrome        Past Surgical History:   Procedure Laterality Date    COSMETIC SURGERY          Social History     Tobacco Use    Smoking status: Never    Smokeless tobacco: Never   Substance Use Topics    Alcohol use: Yes     Alcohol/week: 6.0 standard drinks of alcohol     Types: 6 Glasses of wine per week     Comment: socially    Drug use: Never       Family History   Problem Relation Name Age of Onset    Diabetes Mother Lali     Cataracts Mother Lali     Early death Mother Lali 52        hepatitis c    Arthritis Maternal Aunt Barbara         rheumatoid    Rheum arthritis Maternal Aunt Barbara     Osteoarthritis Maternal Aunt Barbara     Breast cancer Paternal Aunt Lisa Jj     Heart disease Maternal Grandfather      Macular degeneration Paternal Grandmother aHng     Cancer Paternal Grandmother Hang         stomach    Stroke Paternal Grandfather Don        Review of patient's allergies indicates:   Allergen Reactions    Doxycycline Shortness Of Breath     Elevated BP and shortness of breath    Shrimp Swelling    Prednisone Hives     Hives after taking medrol dose pack    Pcn [penicillins] Rash       Medication List with Changes/Refills   Current Medications    CIPROFLOXACIN HCL (CIPRO) 500 MG TABLET    Take 500 mg by mouth 2 (two) times daily.    EPINEPHRINE (EPIPEN) 0.3 MG/0.3 ML ATIN    Inject 0.3 mLs (0.3 mg total) into the muscle once. If symptoms not improved repeat in about 5-15 minutes - inject a second dose (pen) of 0.3 mL into muscle once. for 1 dose    ESOMEPRAZOLE (NEXIUM) 40 MG CAPSULE    Take 1 capsule (40 mg total) by mouth before breakfast.    ESTRADIOL (ESTRACE) 0.01 % (0.1 MG/GRAM) VAGINAL CREAM    Place 1 g vaginally once daily. For 14 days then apply 1 gram twice weekly    FLUTICASONE PROPIONATE (FLONASE) 50 MCG/ACTUATION NASAL SPRAY    1 SPRAY BY EACH NOSTRIL ONCE DAILY    INV DEUCRAVACTINIB/PLACEBO TABLET    Take 2 tablets by mouth in  the morning and 2 tablets by mouth in the evening as directed approximately 12 hours apart. For Investigational Use only. Protocol -3247 Subject ID#  [id number]    METFORMIN (GLUCOPHAGE-XR) 500 MG ER 24HR TABLET    Take 1 tablet (500 mg total) by mouth 2 (two) times daily with meals.    METRONIDAZOLE (METROGEL) 0.75 % GEL    AAA bid. For redness    NYSTATIN-TRIAMCINOLONE (MYCOLOG II) CREAM    Apply to affected area 2 times daily    OXYCODONE-ACETAMINOPHEN (PERCOCET) 5-325 MG PER TABLET    Take by mouth.    VALACYCLOVIR (VALTREX) 1000 MG TABLET    Take 1,000 mg by mouth.    ZOLPIDEM (AMBIEN) 5 MG TAB    Take 5 mg by mouth nightly as needed.   Changed and/or Refilled Medications    Modified Medication Previous Medication    HYDROXYCHLOROQUINE (PLAQUENIL) 200 MG TABLET hydrOXYchloroQUINE (PLAQUENIL) 200 mg tablet       Take 1 tablet (200 mg total) by mouth once daily.    Take 1 tablet (200 mg total) by mouth once daily.    NATREXONE TABLET 4.5 MG natrexone tablet 4.5 mg       Take 1 tablet (4.5 mg total) by mouth every evening.    Take 1 tablet (4.5 mg total) by mouth every evening.    SERTRALINE (ZOLOFT) 25 MG TABLET sertraline (ZOLOFT) 25 MG tablet       Take 1 tablet (25 mg total) by mouth once daily.    Take 1 tablet (25 mg total) by mouth once daily.         Disclaimer: This note was prepared using voice recognition system and is likely to have sound alike errors and is not proofread.  Please message me with any questions.    32 minutes of total time spent on the encounter, which includes face to face time and non-face to face time preparing to see the patient (eg, review of tests), Obtaining and/or reviewing separately obtained history, Documenting clinical information in the electronic or other health record, Independently interpreting results (not separately reported) and communicating results to the patient/family/caregiver, or Care coordination (not separately reported).     Thank you for allowing me to  participate in the care of Olesya Tay.    Manjinder Goodwin MD

## 2024-07-23 ENCOUNTER — RESEARCH ENCOUNTER (OUTPATIENT)
Dept: RESEARCH | Facility: HOSPITAL | Age: 49
End: 2024-07-23
Payer: COMMERCIAL

## 2024-07-23 ENCOUNTER — OFFICE VISIT (OUTPATIENT)
Dept: RHEUMATOLOGY | Facility: CLINIC | Age: 49
End: 2024-07-23
Payer: COMMERCIAL

## 2024-07-23 VITALS
HEART RATE: 76 BPM | DIASTOLIC BLOOD PRESSURE: 90 MMHG | RESPIRATION RATE: 18 BRPM | WEIGHT: 210.13 LBS | TEMPERATURE: 98 F | SYSTOLIC BLOOD PRESSURE: 131 MMHG | BODY MASS INDEX: 32.42 KG/M2

## 2024-07-23 DIAGNOSIS — Z79.899 LONG-TERM USE OF PLAQUENIL: ICD-10-CM

## 2024-07-23 DIAGNOSIS — M35.01 SJOGREN'S SYNDROME WITH KERATOCONJUNCTIVITIS SICCA: Primary | ICD-10-CM

## 2024-07-23 DIAGNOSIS — Z00.6 RESEARCH SUBJECT: ICD-10-CM

## 2024-07-23 DIAGNOSIS — M35.00 SJOGREN'S SYNDROME, WITH UNSPECIFIED ORGAN INVOLVEMENT: Primary | ICD-10-CM

## 2024-07-23 NOTE — PROGRESS NOTES
RHEUMATOLOGY CLINIC ESTABLISHED PATIENT VISIT    Sponsor: [employer]   Title: -4181  IRB #: [id number]  : Neil Morales   Site: 0017  Others Present: N/A  Is LAR consenting for subject: No     Pt No: [id number]    Reason for consult:- Sjogren's syndrome; research subject    Chief complaints, HPI, ROS, EXAM, Assessment & Plans:-    Olesya Tay is a 49 y.o. pleasant female who presents to follow up for research visit today.  Currently in clinical trial for Sjogren's syndrome.  Twenty-eight joint count performed today.  See research documentation.  States she has less arthralgias and morning stiffness.  Fatigue still the same.  Has been taking low-dose naltrexone.  Did have episode of left-sided chest pain lasting 30 minutes.  May have been GERD.  Episode of diarrhea.  Still with some minimal tenderness and swelling at left parotid gland.  Rheumatologic review of systems otherwise negative.      Head: Normal  Eyes: Normal  Ears: Normal  Nose: Normal  Lungs: Normal  Heart: Normal  Musculoskeletal:  Normal  Neurological:  Normal  Skin:  Normal  Lymph nodes:  Normal  Mouth:  Normal  Gastrointestinal:  Normal     Reviewed all available old and outside pertinent medical records.    All lab results personally reviewed and interpreted by me.    1. Sjogren's syndrome with keratoconjunctivitis sicca    2. Research subject    3. Long-term use of Plaquenil          Problem List Items Addressed This Visit          Ophtho    Sjogren's syndrome with keratoconjunctivitis sicca - Primary     Other Visit Diagnoses       Research subject        Long-term use of Plaquenil                  Patient following up today for management of Sjogren's disease  Positive SEAMUS, positive SSA, positive SSB, positive rheumatoid factor with dryness  Currently on hydroxychloroquine 200 mg daily  Would continue daily dosing of hydroxychloroquine 200 mg  Continue low-dose naltrexone  Research visit today for  28 joint count  Continue study drug    # No follow-ups on file.    Chronic comorbid conditions affecting medical decision making today:    Past Medical History:   Diagnosis Date    Anemia     Constipation     Depression     Endometriosis of pelvis     GERD (gastroesophageal reflux disease)     Gestational diabetes mellitus in pregnancy     Heart murmur     Lung nodule     Osteoporosis     Sjogren's syndrome        Past Surgical History:   Procedure Laterality Date    COSMETIC SURGERY          Social History     Tobacco Use    Smoking status: Never    Smokeless tobacco: Never   Substance Use Topics    Alcohol use: Yes     Alcohol/week: 6.0 standard drinks of alcohol     Types: 6 Glasses of wine per week     Comment: socially    Drug use: Never       Family History   Problem Relation Name Age of Onset    Diabetes Mother Lali     Cataracts Mother Lali     Early death Mother Lali 52        hepatitis c    Arthritis Maternal Aunt Barbara         rheumatoid    Rheum arthritis Maternal Aunt Barbara     Osteoarthritis Maternal Aunt Barbara     Breast cancer Paternal Aunt Lisa Jj     Heart disease Maternal Grandfather      Macular degeneration Paternal Grandmother Hang     Cancer Paternal Grandmother Hang         stomach    Stroke Paternal Grandfather Don        Review of patient's allergies indicates:   Allergen Reactions    Doxycycline Shortness Of Breath     Elevated BP and shortness of breath    Shrimp Swelling    Prednisone Hives     Hives after taking medrol dose pack    Pcn [penicillins] Rash       Medication List with Changes/Refills   Current Medications    CIPROFLOXACIN HCL (CIPRO) 500 MG TABLET    Take 500 mg by mouth 2 (two) times daily.    EPINEPHRINE (EPIPEN) 0.3 MG/0.3 ML ATIN    Inject 0.3 mLs (0.3 mg total) into the muscle once. If symptoms not improved repeat in about 5-15 minutes - inject a second dose (pen) of 0.3 mL into muscle once. for 1 dose    ESOMEPRAZOLE (NEXIUM) 40 MG CAPSULE    Take 1 capsule (40  mg total) by mouth before breakfast.    ESTRADIOL (ESTRACE) 0.01 % (0.1 MG/GRAM) VAGINAL CREAM    Place 1 g vaginally once daily. For 14 days then apply 1 gram twice weekly    FLUTICASONE PROPIONATE (FLONASE) 50 MCG/ACTUATION NASAL SPRAY    1 SPRAY BY EACH NOSTRIL ONCE DAILY    HYDROXYCHLOROQUINE (PLAQUENIL) 200 MG TABLET    Take 1 tablet (200 mg total) by mouth once daily.    METFORMIN (GLUCOPHAGE-XR) 500 MG ER 24HR TABLET    Take 1 tablet (500 mg total) by mouth 2 (two) times daily with meals.    METRONIDAZOLE (METROGEL) 0.75 % GEL    AAA bid. For redness    NATREXONE TABLET 4.5 MG    Take 1 tablet (4.5 mg total) by mouth every evening.    NYSTATIN-TRIAMCINOLONE (MYCOLOG II) CREAM    Apply to affected area 2 times daily    OXYCODONE-ACETAMINOPHEN (PERCOCET) 5-325 MG PER TABLET    Take by mouth.    SERTRALINE (ZOLOFT) 25 MG TABLET    Take 1 tablet (25 mg total) by mouth once daily.    VALACYCLOVIR (VALTREX) 1000 MG TABLET    Take 1,000 mg by mouth.    ZOLPIDEM (AMBIEN) 5 MG TAB    Take 5 mg by mouth nightly as needed.   Changed and/or Refilled Medications    Modified Medication Previous Medication    INV DEUCRAVACTINIB/PLACEBO TABLET INV deucravactinib/placebo tablet       Take 2 tablets by mouth in the morning and 2 tablets by mouth in the evening as directed approximately 12 hours apart. For Investigational Use only. Protocol -0284 Subject ID#  [id number]    Take 2 tablets by mouth in the morning and 2 tablets by mouth in the evening as directed approximately 12 hours apart. For Investigational Use only. Protocol -5519 Subject ID#  [id number]         Disclaimer: This note was prepared using voice recognition system and is likely to have sound alike errors and is not proofread.  Please message me with any questions.    32 minutes of total time spent on the encounter, which includes face to face time and non-face to face time preparing to see the patient (eg, review of tests), Obtaining and/or reviewing  separately obtained history, Documenting clinical information in the electronic or other health record, Independently interpreting results (not separately reported) and communicating results to the patient/family/caregiver, or Care coordination (not separately reported).     Thank you for allowing me to participate in the care of Olesya Jj Jasvir.    Manjinder Goodwin MD

## 2024-07-23 NOTE — PROGRESS NOTES
Visit: Week 4/ Re-consenting   Sponsor: [employer]   Title: -2927  IRB #: 2023.121  : Neil Morales   Site: 0017  Others Present: N/A  Pt No: [id number]  Is LAR consenting for subject: No         Informed Consent:      Consenting was completed in private area using the most current IRB approved version of the Informed Consent Form (ICF) by myself.  The patient was given ample time to review the consents prior to execution of the ICF.     Prior to the ICF being signed, or any study protocol required data collection, testing, procedure, or intervention being performed, the following was done and/or discussed:?   Patient was given a copy of the ICF for review: Yes     Purpose of the study and qualifications to participate: Yes   ?   Study design, follow up schedule, and tests or procedures done at each visit: Yes     Confidentiality and HIPAA Authorization for Release of Medical Records for the research trial/ subject's rights/research related injury: Yes     Risk, Benefits, Alternative Treatments, Compensation and Costs: Yes     Participation in the research trial is voluntary and patient may withdraw at anytime:  Yes     Contact information for study related questions: Yes        Patient verbalizes understanding of the above: Yes     Contact information for  PI/Sub-I and CRC given to patient:Yes      Patient able to adequately summarize: the purpose of the study, the risks associated with the study, and all procedures, testing, and follow-ups associated with the study: Yes        Olesya Tay  signed the IRB approved version of the ICF dated [id number].? All pages of the consents were reviewed with the patient, and all questions answered satisfactorily. The patient signed the paper consent form.     Patient consented to participate in genetic testing: Yes     Patient consented to participate in optional testing :   Optional Participant Interviews: Yes  Optional  Long-Term Extension: Yes  Lip Salivary Gland or Parotid Gland Biopsy: No     Blood for DNA: Yes    Optional Future Research: Yes    Patient received a fully executed copy of same. The original consents were scanned into electronic medical records (EPIC).     Time of Consent Execution: 9:30 am.     Subject presents to the clinic for Week 4 visit.     IRT contacted and visit was registered.     Concomitant medications reviewed with the patient and updated to ensure accuracy. Refer to Concomitant Medication Log in subject binder.    Vitals:   BP: 131/90  Pulse: 76  Respiratory Rate: 18  Temp: 97.6  Weight: 95.3  Is Subject of Childbearing Potential: No  Patient was advised about the risk of pregnancy during the course of the study. Patient verbalized understanding of study visit procedures.     Assessments:  eCOA tablet compliance was reviewed, patient completed all required questionnaires.     Patient Assessment completed via TrialMax: Yes  Patient Sjogren's Syndrome Questionnaire completed: Yes, results uploaded into EDC and filed in subject file.  Physical assessment completed: Yes  Tender/Swollen Joint Count completed: Yes , results uploaded into EDC and filed in subject file.  Investigator Assessments completed: Yes, results uploaded into EDC and filed in subject file.    Labs:  Fasting Labs and urine were collected per protocol at 10:48 AM. All labs were processed and shipped per requisition. Requisition number: [id number]  PK Samples collected: Yes            Predose PK Tube ID:19         Collection Time: 10:48 am     Additional Testing:  Urine Pregnancy Test required:  NO:[id number]   Results: N/A    IP Administration/Accountability:  Subject was educated on medication administration and handling. Informed subject to return any/all unused and empty bottles at each on-site visit. Verbalized understanding. Drug accountability completed by myself and research pharmacist. IP returned: 645499- 2 tablets,  805069-25 tablets. Patient reports missing two full doses (On 13Jun2024 and 14Jun2024) and one half dose (On 12Qdp4896 am)    IP dispensed: 507920, 696593    On-site dose administered  at 10:57 AM. Patient tolerated well.     Adverse Events Reported: Yes, see AE log  Left-sided chest pain-resolved  Start Date: 01Jul2024 Start Time: 12 pm End Date: 01Jul2024 End Time: 6:30 pm  Right-sided Abdominal Pain-resolved  Start Date: 01Jul2024 Start Time: 12 pm End Date: 01Jul2024 End Time: 6:30 pm  Diarrhea-resolved  Start Time:17Jul2024 Start Time: 8 am End Date: 17Jul2024 End Time: 10 am     End of Visit:  Patient has the study-related emergency contacts. Subject advised to contact research staff immediately for any adverse effects and if adverse effects occur after hours to report immediately to the ER for treatment. Verbalized understanding.      Week 8 visit was scheduled with the subject.     Visit registered in OnCore.   Clin[employer] payment? Yes

## 2024-08-21 DIAGNOSIS — Z00.6 RESEARCH SUBJECT: ICD-10-CM

## 2024-08-21 DIAGNOSIS — M35.00 SJOGREN'S SYNDROME, WITH UNSPECIFIED ORGAN INVOLVEMENT: Primary | ICD-10-CM

## 2024-08-22 ENCOUNTER — RESEARCH ENCOUNTER (OUTPATIENT)
Dept: RESEARCH | Facility: HOSPITAL | Age: 49
End: 2024-08-22
Payer: COMMERCIAL

## 2024-08-22 ENCOUNTER — OFFICE VISIT (OUTPATIENT)
Dept: RHEUMATOLOGY | Facility: CLINIC | Age: 49
End: 2024-08-22
Payer: COMMERCIAL

## 2024-08-22 VITALS
BODY MASS INDEX: 32.79 KG/M2 | WEIGHT: 212.5 LBS | SYSTOLIC BLOOD PRESSURE: 143 MMHG | HEART RATE: 75 BPM | TEMPERATURE: 97 F | DIASTOLIC BLOOD PRESSURE: 97 MMHG

## 2024-08-22 DIAGNOSIS — Z00.6 RESEARCH SUBJECT: Primary | ICD-10-CM

## 2024-08-22 DIAGNOSIS — M35.01 SJOGREN'S SYNDROME WITH KERATOCONJUNCTIVITIS SICCA: ICD-10-CM

## 2024-08-22 DIAGNOSIS — Z79.899 LONG-TERM USE OF PLAQUENIL: ICD-10-CM

## 2024-08-22 NOTE — PROGRESS NOTES
Intubation    Date/Time: 7/30/2024 11:54 AM    Performed by: Ron Aj CRNA  Authorized by: Adam More MD    Intubation:     Induction:  Rapid sequence induction    Intubated:  Postinduction    Mask Ventilation:  Not attempted    Attempts:  1    Attempted By:  CRNA    Method of Intubation:  Video laryngoscopy    Blade:  Zacarias 3    Laryngeal View Grade: Grade I - full view of cords      Difficult Airway Encountered?: No      Complications:  None    Airway Device:  Oral endotracheal tube    Airway Device Size:  7.0    Style/Cuff Inflation:  Cuffed (inflated to minimal occlusive pressure)    Tube secured:  22    Secured at:  The lips    Placement Verified By:  Capnometry    Complicating Factors:  None    Findings Post-Intubation:  BS equal bilateral and atraumatic/condition of teeth unchanged       Visit: WEEK 8  Sponsor: [employer]   Title: -4136  IRB #: 2023.121  : Neil Morales   Site: 0017  Others Present: N/A  Pt No: [id number]    Subject presents to the clinic for Week 8 visit. Subjects feels overall improvement in dryness and joint tenderness, but persistent fatigue.     IRT contacted and visit was registered.     Concomitant medications reviewed with the patient and updated to ensure accuracy. Refer to Concomitant Medication Log in subject binder.    Vitals:   BP: 143/97  Pulse: 75  Respiratory Rate: 17  Temp: 97.2  Weight: 96.4 kg  Is Subject of Childbearing Potential: No . Patient was advised about the risk of pregnancy during the course of the study. Patient verbalized understanding of study visit procedures.     Assessments:  eCOA tablet compliance was reviewed, patient completed all required questionnaires.     Patient Assessment completed via TrialMax: Yes  Patient Sjogren's Syndrome Questionnaire completed: Yes, results uploaded into EDC and filed in subject file.  Physical assessment completed: Yes  Tender/Swollen Joint Count completed: Yes , results uploaded into EDC and filed in subject file.  Investigator Assessments completed: Yes, results uploaded into EDC and filed in subject file.    Labs:  Fasting Labs and urine were collected per protocol at 11:24 AM. All labs were processed and shipped per requisition. Requisition number: [id number]  PK Samples collected: Yes            Predose PK Tube ID: 24         Collection Time: 11:24 AM     Additional Testing:  Finger Stick RNA performed: No  Urine Pregnancy Test required:  {YES/NO:81684   Results: Negative    IP Administration/Accountability:  Subject was educated on medication administration and handling. Informed subject to return any/all unused and empty bottles at each on-site visit. Verbalized understanding. Drug accountability completed by myself and research pharmacist. IP returned: 0 Tablets-  109966, 8 tablets 842069   dispensed: 201664, 582535    On-site dose administered  at 12:15 PM. Patient tolerated well.     Adverse Events Reported: No    End of Visit:  Patient has the study-related emergency contacts. Subject advised to contact research staff immediately for any adverse effects and if adverse effects occur after hours to report immediately to the ER for treatment. Verbalized understanding.      Week 12 visit was scheduled with the subject.     Visit registered in OnCore.   [employer]in[employer] payment? Yes

## 2024-08-22 NOTE — PROGRESS NOTES
RHEUMATOLOGY CLINIC ESTABLISHED PATIENT VISIT    Sponsor: [facility]   Title: -3693  IRB #: [id number]  : Neil Morales   Site: 0017  Others Present: N/A  Is LAR consenting for subject: No     Pt No: [id number]  Week 8.     Reason for consult:- Sjogren's syndrome; research subject    Chief complaints, HPI, ROS, EXAM, Assessment & Plans:-    Olesya Tay is a 49 y.o. pleasant female who presents to follow up for research visit today.  Currently in clinical trial for Sjogren's syndrome.  Twenty-eight joint count performed today.  See research documentation.  States she has less arthralgias and morning stiffness.  Fatigue still the same.  Has been taking low-dose naltrexone.    Still with some minimal tenderness and swelling at left parotid gland.  Rheumatologic review of systems otherwise negative.      Physical Exam    Date completed: 08/22/2024    Physician: Neil Morales MD    Head: normal  Eyes: normal  Ears: normal  Nose: normal  Lungs: normal  Heart: normal  Musculoskeletal: normal  Neurological: normal  Skin: normal  Lymph nodes: normal  Mouth: normal  Gastrointestinal: normal    Reviewed all available old and outside pertinent medical records.    All lab results personally reviewed and interpreted by me.    1. Research subject    2. Sjogren's syndrome with keratoconjunctivitis sicca    3. Long-term use of Plaquenil          Problem List Items Addressed This Visit       Long-term use of Plaquenil    Research subject - Primary    Sjogren's syndrome with keratoconjunctivitis sicca         Patient following up today for management of Sjogren's disease  Positive SEAMUS, positive SSA, positive SSB, positive rheumatoid factor with dryness  Currently on hydroxychloroquine 200 mg daily.   Would continue daily dosing of hydroxychloroquine 200 mg  Continue low-dose naltrexone  Research visit today for 28 joint count  Continue study drug      Chronic comorbid conditions  affecting medical decision making today:    Past Medical History:   Diagnosis Date    Anemia     Constipation     Depression     Endometriosis of pelvis     GERD (gastroesophageal reflux disease)     Gestational diabetes mellitus in pregnancy     Heart murmur     Lung nodule     Osteoporosis     Sjogren's syndrome        Past Surgical History:   Procedure Laterality Date    COSMETIC SURGERY          Social History     Tobacco Use    Smoking status: Never    Smokeless tobacco: Never   Substance Use Topics    Alcohol use: Yes     Alcohol/week: 6.0 standard drinks of alcohol     Types: 6 Glasses of wine per week     Comment: socially    Drug use: Never       Family History   Problem Relation Name Age of Onset    Diabetes Mother Lali     Cataracts Mother Lali     Early death Mother Lali 52        hepatitis c    Arthritis Maternal Aunt Barbara         rheumatoid    Rheum arthritis Maternal Aunt Barbara     Osteoarthritis Maternal Aunt Barbara     Breast cancer Paternal Aunt Lisa Jj     Heart disease Maternal Grandfather      Macular degeneration Paternal Grandmother Hang     Cancer Paternal Grandmother Hang         stomach    Stroke Paternal Grandfather Don        Review of patient's allergies indicates:   Allergen Reactions    Doxycycline Shortness Of Breath     Elevated BP and shortness of breath    Shrimp Swelling    Prednisone Hives     Hives after taking medrol dose pack    Pcn [penicillins] Rash       Medication List with Changes/Refills   Current Medications    EPINEPHRINE (EPIPEN) 0.3 MG/0.3 ML ATIN    Inject 0.3 mLs (0.3 mg total) into the muscle once. If symptoms not improved repeat in about 5-15 minutes - inject a second dose (pen) of 0.3 mL into muscle once. for 1 dose    ESOMEPRAZOLE (NEXIUM) 40 MG CAPSULE    Take 1 capsule (40 mg total) by mouth before breakfast.    ESTRADIOL (ESTRACE) 0.01 % (0.1 MG/GRAM) VAGINAL CREAM    Place 1 g vaginally once daily. For 14 days then apply 1 gram twice weekly     FLUTICASONE PROPIONATE (FLONASE) 50 MCG/ACTUATION NASAL SPRAY    1 SPRAY BY EACH NOSTRIL ONCE DAILY    HYDROXYCHLOROQUINE (PLAQUENIL) 200 MG TABLET    Take 1 tablet (200 mg total) by mouth once daily.    INV DEUCRAVACTINIB/PLACEBO TABLET    Take 2 tablets by mouth in the morning and 2 tablets by mouth in the evening as directed approximately 12 hours apart. For Investigational Use only. Protocol -1193 Subject ID#  [id number]    METFORMIN (GLUCOPHAGE-XR) 500 MG ER 24HR TABLET    Take 1 tablet (500 mg total) by mouth 2 (two) times daily with meals.    METRONIDAZOLE (METROGEL) 0.75 % GEL    AAA bid. For redness    NATREXONE TABLET 4.5 MG    Take 1 tablet (4.5 mg total) by mouth every evening.    NYSTATIN-TRIAMCINOLONE (MYCOLOG II) CREAM    Apply to affected area 2 times daily    SERTRALINE (ZOLOFT) 25 MG TABLET    Take 1 tablet (25 mg total) by mouth once daily.    ZOLPIDEM (AMBIEN) 5 MG TAB    Take 5 mg by mouth nightly as needed.   Discontinued Medications    CIPROFLOXACIN HCL (CIPRO) 500 MG TABLET    Take 500 mg by mouth 2 (two) times daily.    OXYCODONE-ACETAMINOPHEN (PERCOCET) 5-325 MG PER TABLET    Take by mouth.    VALACYCLOVIR (VALTREX) 1000 MG TABLET    Take 1,000 mg by mouth.         Disclaimer: This note was prepared using voice recognition system and is likely to have sound alike errors and is not proofread.  Please message me with any questions.

## 2024-09-05 ENCOUNTER — PATIENT MESSAGE (OUTPATIENT)
Dept: PULMONOLOGY | Facility: CLINIC | Age: 49
End: 2024-09-05
Payer: COMMERCIAL

## 2024-09-05 DIAGNOSIS — G47.33 OSA (OBSTRUCTIVE SLEEP APNEA): Primary | ICD-10-CM

## 2024-09-05 NOTE — PROGRESS NOTES
Orders Placed This Encounter   Procedures    HME - OTHER     CPAP 7 cm and download  in 12 weeks     Order Specific Question:   Type of Equipment:     Answer:   CPAP     Order Specific Question:   Height:     Answer:   5 7     Order Specific Question:   Weight:     Answer:   BMI 32

## 2024-09-16 DIAGNOSIS — M35.00 SJOGREN'S SYNDROME, WITH UNSPECIFIED ORGAN INVOLVEMENT: Primary | ICD-10-CM

## 2024-09-16 DIAGNOSIS — Z00.6 RESEARCH SUBJECT: ICD-10-CM

## 2024-09-17 ENCOUNTER — RESEARCH ENCOUNTER (OUTPATIENT)
Dept: RESEARCH | Facility: HOSPITAL | Age: 49
End: 2024-09-17
Payer: COMMERCIAL

## 2024-09-17 ENCOUNTER — OFFICE VISIT (OUTPATIENT)
Dept: RHEUMATOLOGY | Facility: CLINIC | Age: 49
End: 2024-09-17
Payer: COMMERCIAL

## 2024-09-17 ENCOUNTER — OFFICE VISIT (OUTPATIENT)
Dept: OPHTHALMOLOGY | Facility: CLINIC | Age: 49
End: 2024-09-17
Payer: COMMERCIAL

## 2024-09-17 VITALS
WEIGHT: 212.31 LBS | BODY MASS INDEX: 32.76 KG/M2 | DIASTOLIC BLOOD PRESSURE: 81 MMHG | RESPIRATION RATE: 18 BRPM | HEART RATE: 77 BPM | SYSTOLIC BLOOD PRESSURE: 136 MMHG

## 2024-09-17 DIAGNOSIS — M35.01 SJOGREN'S SYNDROME WITH KERATOCONJUNCTIVITIS SICCA: Primary | ICD-10-CM

## 2024-09-17 DIAGNOSIS — Z79.899 ENCOUNTER FOR LONG-TERM CURRENT USE OF HIGH RISK MEDICATION: ICD-10-CM

## 2024-09-17 DIAGNOSIS — R76.8 RHEUMATOID FACTOR POSITIVE: ICD-10-CM

## 2024-09-17 DIAGNOSIS — Z00.6 RESEARCH SUBJECT: Primary | ICD-10-CM

## 2024-09-17 DIAGNOSIS — Z87.898 HISTORY OF ANGIOEDEMA: ICD-10-CM

## 2024-09-17 DIAGNOSIS — M35.01 SJOGREN'S SYNDROME WITH KERATOCONJUNCTIVITIS SICCA: ICD-10-CM

## 2024-09-17 DIAGNOSIS — Z79.899 LONG-TERM USE OF PLAQUENIL: ICD-10-CM

## 2024-09-17 RX ORDER — DIPHENHYDRAMINE HCL 25 MG
25 CAPSULE ORAL EVERY 6 HOURS PRN
COMMUNITY
Start: 2024-09-13

## 2024-09-17 RX ORDER — LORATADINE 10 MG/1
10 TABLET ORAL DAILY
COMMUNITY
Start: 2024-09-15

## 2024-09-17 NOTE — PROGRESS NOTES
HPI    Pt states her eyes have been dry. No pain or irritation. Va stable  Last edited by Kehinde Garces on 9/17/2024  1:09 PM.            Assessment /Plan     For exam results, see Encounter Report.      ICD-10-CM ICD-9-CM    1. Sjogren's syndrome with keratoconjunctivitis sicca  M35.01 710.2 Protocol: [facility] -2868  Ophthalmology : Juan Diego Sahni MD     Appendix 4 - Ocular Assessments Worksheet     Schirmer's Test     Start and stop time should be recorded and based on a 5-minute period for testing.      Sample Collection Start Time: 1:16 pm  Sample Collection Stop Time: 1:21 pm     Measurements (mm)       Right Eye Left Eye   Leading edge (mm) 11 12   Lagging edge (mm) 9 12   Median value 10 12.5   Was moisture strip saturated (>35 mm)? Yes/No No  No       Tear Break-up Test              Right Eye Left Eye   Test #1 (seconds) 6 7    Test 2 (seconds)  5 10   Test #3 (seconds) 5 12   Result of Ophthalmological Examination (seconds; TBUT is the average of 3 scores) 5.3 9.7   Is debris present? Yes/No No No          Van Bijsterlorenza Score:       Right Eye Left Eye   Medial/Nasal Conjunctiva Grade (0 to 3) 0 1   Lateral/Temporal Conjunctiva Grade (0 to 3) 0 0   Cornea Grade (0 to 3) 1 1   Total score (0 to 9) 1 2         SICCA Ocular Staining Score       Right Eye Left Eye   Corneal Grade (0 to 3) 1 1   Confluency   ( 0 or 1 ) 0 0   Pupillary staining ( 0 or 1)  0 0   Filament staining (0 or 1) 0 0   Conjunctiva temporal (0 to 3) 0 0   Conjunctiva nasal (0 to 3) 0 1   Total score (0 to 12) 1 2          2. Rheumatoid factor positive  R76.8 795.79       3. Encounter for long-term current use of high risk medication  Z79.899 V58.69

## 2024-09-17 NOTE — PROGRESS NOTES
RHEUMATOLOGY CLINIC ESTABLISHED PATIENT VISIT    Sponsor: [employer]   Title: -6811  IRB #: [id number]  : Neil Morales   Site: 0017  Others Present: N/A  Is LAR consenting for subject: No     Pt No: [id number]  Week 12.     Reason for consult:- Sjogren's syndrome; research subject    Chief complaints, HPI, ROS, EXAM, Assessment & Plans:-    Olesya Tay is a 49 y.o. pleasant female who presents to follow up for research visit today.  Currently in clinical trial for Sjogren's syndrome.  Denies any significant joint pain today.  No joint swelling or prolonged morning stiffness.  No significant change in fatigue.  Had another episode of facial swelling and allergic reaction last week.  She thinks it maybe related to eating shrimp.  History of angioedema.  No recent angioedema.  Rheumatologic review of systems otherwise negative.      Physical Exam    Date completed: 09/17/2024    Physician: Neil Morales MD    Head: normal  Eyes: normal  Ears: normal  Nose: normal  Lungs: normal  Heart: normal  Musculoskeletal: normal  Neurological: normal  Skin: normal  Lymph nodes: normal  Mouth: normal  Gastrointestinal: normal    Reviewed all available old and outside pertinent medical records.    All lab results personally reviewed and interpreted by me.    1. Research subject    2. Sjogren's syndrome with keratoconjunctivitis sicca    3. Long-term use of Plaquenil    4. Encounter for long-term current use of high risk medication    5. History of angioedema          Problem List Items Addressed This Visit       Encounter for long-term current use of high risk medication    History of angioedema    Relevant Orders    Ambulatory referral/consult to Allergy    Long-term use of Plaquenil    Research subject - Primary    Sjogren's syndrome with keratoconjunctivitis sicca         Patient following up today for management of Sjogren's disease  Positive SEAMUS, positive SSA, positive  SSB, positive rheumatoid factor with dryness  Currently on hydroxychloroquine 200 mg daily add research medication.  Significant improvement of arthritis on research medication.  No significant change in fatigue.  Continuing low-dose naltrexone .  Intermittent episodes of allergies with history of angioedema with multiple etiologies- ? Seafood allergy. She does tolerate shrimp most of the times. Consult Dr. Hagen.   Would continue daily dosing of hydroxychloroquine 200 mg  Continue low-dose naltrexone  Research visit today for 28 joint count  Continue study drug        Past Medical History:   Diagnosis Date    Anemia     Constipation     Depression     Endometriosis of pelvis     GERD (gastroesophageal reflux disease)     Gestational diabetes mellitus in pregnancy     Heart murmur     Lung nodule     Osteoporosis     Sjogren's syndrome        Past Surgical History:   Procedure Laterality Date    COSMETIC SURGERY          Social History     Tobacco Use    Smoking status: Never    Smokeless tobacco: Never   Substance Use Topics    Alcohol use: Yes     Alcohol/week: 6.0 standard drinks of alcohol     Types: 6 Glasses of wine per week     Comment: socially    Drug use: Never       Family History   Problem Relation Name Age of Onset    Diabetes Mother Lali     Cataracts Mother Lali     Early death Mother Lali 52        hepatitis c    Arthritis Maternal Aunt Barbara         rheumatoid    Rheum arthritis Maternal Aunt Barbara     Osteoarthritis Maternal Aunt Barbara     Breast cancer Paternal Aunt Lisa Jj     Heart disease Maternal Grandfather      Macular degeneration Paternal Grandmother Hang     Cancer Paternal Grandmother Hang         stomach    Stroke Paternal Grandfather Don        Review of patient's allergies indicates:   Allergen Reactions    Doxycycline Shortness Of Breath     Elevated BP and shortness of breath    Shrimp Swelling    Prednisone Hives     Hives after taking medrol dose pack    Pcn [penicillins] Rash        Medication List with Changes/Refills   Current Medications    EPINEPHRINE (EPIPEN) 0.3 MG/0.3 ML ATIN    Inject 0.3 mLs (0.3 mg total) into the muscle once. If symptoms not improved repeat in about 5-15 minutes - inject a second dose (pen) of 0.3 mL into muscle once. for 1 dose    ESOMEPRAZOLE (NEXIUM) 40 MG CAPSULE    Take 1 capsule (40 mg total) by mouth before breakfast.    ESTRADIOL (ESTRACE) 0.01 % (0.1 MG/GRAM) VAGINAL CREAM    Place 1 g vaginally once daily. For 14 days then apply 1 gram twice weekly    FLUTICASONE PROPIONATE (FLONASE) 50 MCG/ACTUATION NASAL SPRAY    1 SPRAY BY EACH NOSTRIL ONCE DAILY    HYDROXYCHLOROQUINE (PLAQUENIL) 200 MG TABLET    Take 1 tablet (200 mg total) by mouth once daily.    INV DEUCRAVACTINIB/PLACEBO TABLET    Take 2 tablets by mouth in the morning and 2 tablets by mouth in the evening as directed approximately 12 hours apart. For Investigational Use only. Protocol -6105 Subject ID#  [id number]    METFORMIN (GLUCOPHAGE-XR) 500 MG ER 24HR TABLET    Take 1 tablet (500 mg total) by mouth 2 (two) times daily with meals.    METRONIDAZOLE (METROGEL) 0.75 % GEL    AAA bid. For redness    NATREXONE TABLET 4.5 MG    Take 1 tablet (4.5 mg total) by mouth every evening.    NYSTATIN-TRIAMCINOLONE (MYCOLOG II) CREAM    Apply to affected area 2 times daily    SERTRALINE (ZOLOFT) 25 MG TABLET    Take 1 tablet (25 mg total) by mouth once daily.    ZOLPIDEM (AMBIEN) 5 MG TAB    Take 5 mg by mouth nightly as needed.         Disclaimer: This note was prepared using voice recognition system and is likely to have sound alike errors and is not proofread.  Please message me with any questions.

## 2024-09-17 NOTE — PROGRESS NOTES
Visit: WEEK 12  Sponsor: [employer]   Title: -3835  IRB #: [id number]  : Neil Morales   Site: 0017  Others Present: N/A  Pt No: [id number]    Subject presents to the clinic for Week 12 visit.    IRT contacted and visit was registered.     Concomitant medications reviewed with the patient and updated to ensure accuracy. Refer to Concomitant Medication Log in subject binder.    Vitals:   BP: 136/81  Pulse: 77 beats/minute  Respiratory Rate: 18 breaths/minute  Temp: 97.6 degrees Farenheit  Weight: 96.3 kg  Is Subject of Childbearing Potential: No, post-menopausal . Patient was advised about the risk of pregnancy during the course of the study. Patient verbalized understanding of study visit procedures.     Assessments:  eCOA tablet compliance was reviewed, patient completed all required questionnaires for last 7 days, except on [id number]. Subject reports that she missed questionnaire on [id number] - patient unsure why she missed questionnaire. Counseled subject on the importance of completing the questionnaires 7 days prior to on-site visit. Coordinator to call subject 7 days prior to study visit as an additional reminder.   Patient Assessment completed via TrialMax: Yes  Patient Sjogren's Syndrome Questionnaire completed: Yes, results uploaded into EDC and filed in subject file.  Physical assessment completed: Yes  Tender/Swollen Joint Count completed: Yes , results uploaded into EDC and filed in subject file.  Investigator Assessments completed: Yes, results uploaded into EDC and filed in subject file.    Labs:  Fasting Labs and urine were collected per protocol at 10:15 AM. All labs were processed and shipped per requisition. Requisition number: [id number],  Biomarkers collected: Yes  RNA sample (whole blood) collected: Yes  Flow Cytometry collected: Yes  Requisition Number: [id number]  Cryoglobulin collected: Yes, Requisition Number: [id number]  PK Samples collected: Yes             Predose PK Tube ID: 22         Collection Time: 10:15 AM   Requisition Number: [id number]            Post-dose (30 MIN) PK Tube ID:  05                                Collection Time: 10:45 AM     Requisition Number: [id number]            Post-dose (2 HR) PK Tube ID:  05                   Collection Time: 12:15 PM  Requisition Number: [id number]      Additional Testing:  Salivary Flow Test performed: Yes                  Stimulated with paraffin: Yes Weight: 2.88 grams (Pre-sample collection vial: 2.27 grams)    Start Time: 9:32 AM  Stop Time: 9:37 AM:                  Unstimulated: Yes Weight: 3.53 grams (Pre-sample collection vial: 2.27 grams)    Start Time: 9:38 AM   Stop Time: 9:43 AM  meera Rice performed: Yes, refer to ophthalmologist note   Tear Breakup Evaluation performed : Yes, refer to ophthalmologist note.     Urine Pregnancy Test required:  No  Results: N/A    IP Administration/Accountability:  Subject was educated on medication administration and handling. Informed subject to return any/all unused and empty bottles at each on-site visit. Verbalized understanding. Drug accountability completed by myself and research pharmacist. IP returned: [id number] = 0 , [id number] = 38   Subject reports she held dose on [id number] - [id number]- due to allergic reaction. Okay to continue study medication today per PI.    IP dispensed: [id number], [id number]    On-site dose administered  at 10:16 AM.  Patient was monitored two hours post-dose to complete PK sampling. Patient tolerated well.    Adverse Events Reported: Yes  Allergic Reaction - ([id number] - ONGOING); Treatment: Benadryl 25mg by mouth prn, Loratadine 10mg QD - ConMed list updated in EDC. Referral to see Allergist per PI.    End of Visit:  Patient has the study-related emergency contacts. Subject advised to contact research staff immediately for any adverse effects and if adverse effects occur after hours to report immediately to the ER for treatment. Verbalized  understanding.      Week 16 visit was scheduled with the subject.     Visit registered in [employer].   [employer] payment? Yes

## 2024-09-25 ENCOUNTER — PATIENT MESSAGE (OUTPATIENT)
Dept: INTERNAL MEDICINE | Facility: CLINIC | Age: 49
End: 2024-09-25
Payer: COMMERCIAL

## 2024-09-25 DIAGNOSIS — E66.9 OBESITY (BMI 30.0-34.9): Primary | ICD-10-CM

## 2024-09-25 DIAGNOSIS — E66.811 OBESITY (BMI 30.0-34.9): Primary | ICD-10-CM

## 2024-09-25 RX ORDER — TIRZEPATIDE 2.5 MG/.5ML
2.5 INJECTION, SOLUTION SUBCUTANEOUS
Qty: 3 ML | Refills: 1 | Status: SHIPPED | OUTPATIENT
Start: 2024-09-25

## 2024-09-30 ENCOUNTER — PATIENT MESSAGE (OUTPATIENT)
Dept: INTERNAL MEDICINE | Facility: CLINIC | Age: 49
End: 2024-09-30
Payer: COMMERCIAL

## 2024-10-04 DIAGNOSIS — E66.811 OBESITY (BMI 30.0-34.9): ICD-10-CM

## 2024-10-04 RX ORDER — TIRZEPATIDE 2.5 MG/.5ML
2.5 INJECTION, SOLUTION SUBCUTANEOUS
Qty: 3 ML | Refills: 1 | Status: CANCELLED | OUTPATIENT
Start: 2024-10-04

## 2024-10-07 DIAGNOSIS — E66.811 OBESITY (BMI 30.0-34.9): ICD-10-CM

## 2024-10-07 RX ORDER — TIRZEPATIDE 2.5 MG/.5ML
2.5 INJECTION, SOLUTION SUBCUTANEOUS
Qty: 3 ML | Refills: 1 | Status: SHIPPED | OUTPATIENT
Start: 2024-10-07

## 2024-10-11 ENCOUNTER — TELEPHONE (OUTPATIENT)
Dept: OPHTHALMOLOGY | Facility: CLINIC | Age: 49
End: 2024-10-11
Payer: COMMERCIAL

## 2024-10-14 ENCOUNTER — PATIENT MESSAGE (OUTPATIENT)
Dept: INTERNAL MEDICINE | Facility: CLINIC | Age: 49
End: 2024-10-14
Payer: COMMERCIAL

## 2024-10-14 ENCOUNTER — OFFICE VISIT (OUTPATIENT)
Dept: CARDIOLOGY | Facility: CLINIC | Age: 49
End: 2024-10-14
Payer: COMMERCIAL

## 2024-10-14 VITALS
HEART RATE: 81 BPM | HEIGHT: 68 IN | BODY MASS INDEX: 32.64 KG/M2 | SYSTOLIC BLOOD PRESSURE: 138 MMHG | WEIGHT: 215.38 LBS | DIASTOLIC BLOOD PRESSURE: 80 MMHG | OXYGEN SATURATION: 97 %

## 2024-10-14 DIAGNOSIS — E66.811 OBESITY (BMI 30.0-34.9): ICD-10-CM

## 2024-10-14 DIAGNOSIS — G47.33 OSA ON CPAP: ICD-10-CM

## 2024-10-14 DIAGNOSIS — R06.02 SHORTNESS OF BREATH: ICD-10-CM

## 2024-10-14 DIAGNOSIS — M35.00 SJOGREN'S SYNDROME, WITH UNSPECIFIED ORGAN INVOLVEMENT: Primary | ICD-10-CM

## 2024-10-14 DIAGNOSIS — I10 PRIMARY HYPERTENSION: ICD-10-CM

## 2024-10-14 PROCEDURE — 1159F MED LIST DOCD IN RCRD: CPT | Mod: CPTII,S$GLB,, | Performed by: INTERNAL MEDICINE

## 2024-10-14 PROCEDURE — 3008F BODY MASS INDEX DOCD: CPT | Mod: CPTII,S$GLB,, | Performed by: INTERNAL MEDICINE

## 2024-10-14 PROCEDURE — 3075F SYST BP GE 130 - 139MM HG: CPT | Mod: CPTII,S$GLB,, | Performed by: INTERNAL MEDICINE

## 2024-10-14 PROCEDURE — 99214 OFFICE O/P EST MOD 30 MIN: CPT | Mod: S$GLB,,, | Performed by: INTERNAL MEDICINE

## 2024-10-14 PROCEDURE — 99999 PR PBB SHADOW E&M-EST. PATIENT-LVL IV: CPT | Mod: PBBFAC,,, | Performed by: INTERNAL MEDICINE

## 2024-10-14 PROCEDURE — 3079F DIAST BP 80-89 MM HG: CPT | Mod: CPTII,S$GLB,, | Performed by: INTERNAL MEDICINE

## 2024-10-14 RX ORDER — TIRZEPATIDE 2.5 MG/.5ML
2.5 INJECTION, SOLUTION SUBCUTANEOUS
Qty: 3 ML | Refills: 0 | Status: SHIPPED | OUTPATIENT
Start: 2024-10-14

## 2024-10-14 RX ORDER — DILTIAZEM HYDROCHLORIDE 180 MG/1
180 CAPSULE, COATED, EXTENDED RELEASE ORAL DAILY
Qty: 30 CAPSULE | Refills: 11 | Status: SHIPPED | OUTPATIENT
Start: 2024-10-14 | End: 2025-10-14

## 2024-10-14 NOTE — PROGRESS NOTES
Subjective:   Patient ID:  Olesya Tay is a 49 y.o. female who presents for evaluation of No chief complaint on file.        HPI  10.14.2024  For 1 year follow-up.    Complains of dyspnea on exertion.  NYHA 1.  No significant lower extremity swelling or orthopnea.    He she had her facial surgery done.    No significant palpitations syncope or presyncope.      10.9.23  Comes in for a 1 year follow-up.    Her stress echo was normal last year.    She has been struggling losing weight.    Denies any chest pain, still have some palpitations.    She does drink 3 cups of coffee a day and sometimes ice drink in the evening.  She had a normal stress echo however she did have some hypertensive response.  Also states that she did not sleep that night.    Her Holter monitor showed about 1% of PVCs.    10.2022  47-year-old female, never smoker, history of SEAMUS positive, Sjogren's disease.    Comes in for evaluation of dyspnea on exertion.  NYHA 1. She states not all the time but sporadic.    Also sometimes she has dyspnea while she is resting.  States that she has to yawn for her to feel better.    She denies any lower extremity swelling.  She does have obstructive sleep apnea she is not always consistent with using her CPAP.    She does also report palpitations short lasting.  Also sporadic.  On exam she has either PACs or PVCs.  She reports she was told she had mitral valve prolapse about 10 years ago.  No records available.      Past Medical History:   Diagnosis Date    Anemia     Constipation     Depression     Endometriosis of pelvis     GERD (gastroesophageal reflux disease)     Gestational diabetes mellitus in pregnancy     Heart murmur     Lung nodule     Osteoporosis     Sjogren's syndrome        Past Surgical History:   Procedure Laterality Date    COSMETIC SURGERY         Social History     Tobacco Use    Smoking status: Never    Smokeless tobacco: Never   Substance Use Topics    Alcohol use: Yes      Alcohol/week: 6.0 standard drinks of alcohol     Types: 6 Glasses of wine per week     Comment: socially    Drug use: Never       Family History   Problem Relation Name Age of Onset    Diabetes Mother Lali     Cataracts Mother Lali     Early death Mother Lali 52        hepatitis c    Arthritis Maternal Aunt Barbara         rheumatoid    Rheum arthritis Maternal Aunt Barbara     Osteoarthritis Maternal Aunt Barbara     Breast cancer Paternal Aunt Lisa Jj     Heart disease Maternal Grandfather      Macular degeneration Paternal Grandmother Hang     Cancer Paternal Grandmother Hang         stomach    Stroke Paternal Grandfather Don        Review of Systems   Cardiovascular:  Positive for dyspnea on exertion. Negative for chest pain, palpitations and syncope.   Genitourinary: Negative.    Neurological: Negative.        Current Outpatient Medications on File Prior to Visit   Medication Sig    diphenhydrAMINE (BENADRYL) 25 mg capsule Take 25 mg by mouth every 6 (six) hours as needed for Itching.    EPINEPHrine (EPIPEN) 0.3 mg/0.3 mL AtIn Inject 0.3 mLs (0.3 mg total) into the muscle once. If symptoms not improved repeat in about 5-15 minutes - inject a second dose (pen) of 0.3 mL into muscle once. for 1 dose    esomeprazole (NEXIUM) 40 MG capsule Take 1 capsule (40 mg total) by mouth before breakfast.    estradioL (ESTRACE) 0.01 % (0.1 mg/gram) vaginal cream Place 1 g vaginally once daily. For 14 days then apply 1 gram twice weekly    hydroxychloroquine (PLAQUENIL) 200 mg tablet Take 1 tablet (200 mg total) by mouth once daily.    INV deucravactinib/placebo tablet Take 2 tablets by mouth in the morning and 2 tablets by mouth in the evening as directed approximately 12 hours apart. For Investigational Use only. Protocol -3426 Subject ID#  [id number]    loratadine (CLARITIN) 10 mg tablet Take 10 mg by mouth once daily.    metFORMIN (GLUCOPHAGE-XR) 500 MG ER 24hr tablet Take 1 tablet (500 mg total) by mouth 2 (two) times  daily with meals.    natrexone tablet 4.5 mg Take 1 tablet (4.5 mg total) by mouth every evening.    nystatin-triamcinolone (MYCOLOG II) cream Apply to affected area 2 times daily    sertraline (ZOLOFT) 25 MG tablet Take 1 tablet (25 mg total) by mouth once daily.    fluticasone propionate (FLONASE) 50 mcg/actuation nasal spray 1 SPRAY BY EACH NOSTRIL ONCE DAILY (Patient not taking: Reported on 10/14/2024)    metroNIDAZOLE (METROGEL) 0.75 % gel AAA bid. For redness (Patient not taking: Reported on 10/14/2024)    zolpidem (AMBIEN) 5 MG Tab Take 5 mg by mouth nightly as needed. (Patient not taking: Reported on 10/14/2024)    [DISCONTINUED] tirzepatide, weight loss, (ZEPBOUND) 2.5 mg/0.5 mL Soln Inject 0.5 mLs (2.5 mg total) into the skin every 7 days.     No current facility-administered medications on file prior to visit.       Objective:   Objective:  Wt Readings from Last 3 Encounters:   10/14/24 97.7 kg (215 lb 6.2 oz)   09/17/24 96.3 kg (212 lb 4.9 oz)   08/22/24 96.4 kg (212 lb 8.4 oz)     Temp Readings from Last 3 Encounters:   08/22/24 97.2 °F (36.2 °C)   07/23/24 97.6 °F (36.4 °C)   06/25/24 97.8 °F (36.6 °C)     BP Readings from Last 3 Encounters:   10/14/24 138/80   09/17/24 136/81   08/22/24 (!) 143/97     Pulse Readings from Last 3 Encounters:   10/14/24 81   09/17/24 77   08/22/24 75       Physical Exam  Vitals reviewed.   Constitutional:       Appearance: She is well-developed.   Neck:      Vascular: No carotid bruit.   Cardiovascular:      Rate and Rhythm: Normal rate and regular rhythm.      Pulses: Intact distal pulses.      Heart sounds: Normal heart sounds. No murmur heard.  Pulmonary:      Breath sounds: Normal breath sounds.   Neurological:      Mental Status: She is oriented to person, place, and time.         Lab Results   Component Value Date    CHOL 221 (H) 11/01/2023    CHOL 207 (H) 10/25/2022    CHOL 187 09/09/2019     Lab Results   Component Value Date    HDL 57 11/01/2023    HDL 57  10/25/2022    HDL 50 09/09/2019     Lab Results   Component Value Date    LDLCALC 139.0 11/01/2023    LDLCALC 127.6 10/25/2022    LDLCALC 110.2 09/09/2019     Lab Results   Component Value Date    TRIG 125 11/01/2023    TRIG 112 10/25/2022    TRIG 134 09/09/2019     Lab Results   Component Value Date    CHOLHDL 25.8 11/01/2023    CHOLHDL 27.5 10/25/2022    CHOLHDL 26.7 09/09/2019       Chemistry        Component Value Date/Time     11/01/2023 1451    K 3.6 11/01/2023 1451     11/01/2023 1451    CO2 22 (L) 11/01/2023 1451    BUN 13 11/01/2023 1451    CREATININE 0.9 11/01/2023 1451    GLU 84 11/01/2023 1451        Component Value Date/Time    CALCIUM 9.2 11/01/2023 1451    ALKPHOS 85 03/28/2023 1552    AST 22 03/28/2023 1552    ALT 21 03/28/2023 1552    BILITOT 0.3 03/28/2023 1552    ESTGFRAFRICA >60 04/19/2022 1252    EGFRNONAA >60 04/19/2022 1252          Lab Results   Component Value Date    TSH 1.859 11/01/2023     Lab Results   Component Value Date    INR 1.0 11/01/2023     Lab Results   Component Value Date    WBC 4.83 11/01/2023    HGB 13.0 11/01/2023    HCT 39.7 11/01/2023    MCV 94 11/01/2023     11/01/2023     BNP  @LABRCNTIP(BNP,BNPTRIAGEBLO)@  CrCl cannot be calculated (Patient's most recent lab result is older than the maximum 7 days allowed.).     Imaging:  ======  No results found for this or any previous visit.    No results found for this or any previous visit.    No results found for this or any previous visit.    Results for orders placed during the hospital encounter of 02/12/16    X-Ray Chest PA And Lateral    Narrative  2 view chest    Comparison: None    Findings: The lungs are clear and free of infiltrate.  No pleural effusion or pneumothorax is identified.  The heart is not enlarged.  IMPRESSION:      1.  No acute cardiopulmonary process.  ______________________________________    Electronically signed by: REFUGIO GOODEN D.O.  Date:     02/12/16  Time:    09:18    No  results found for this or any previous visit.    No valid procedures specified.    Diagnostic Results:  ECG: Reviewed      Summary    The test was stopped because the patient experienced fatigue and shortness of breath. The patient reached the end of the protocol. The patient requested the test to be stopped.  The left ventricle is normal in size with normal systolic function.  The estimated ejection fraction is 65%.  Indeterminate left ventricular diastolic function.  Normal right ventricular size with normal right ventricular systolic function.  Mild tricuspid regurgitation.  Normal central venous pressure (3 mmHg).  The estimated PA systolic pressure is 27 mmHg.  The stress echo portion of this study is negative for myocardial ischemia.  The patient's exercise capacity was normal.  During stress, the following significant arrhythmias were observed: occasional PVCs.  The ECG portion of this study is negative for myocardial ischemia    The 10-year ASCVD risk score (Brijesh RESTREPO, et al., 2019) is: 2.1%    Values used to calculate the score:      Age: 49 years      Sex: Female      Is Non- : No      Diabetic: No      Tobacco smoker: No      Systolic Blood Pressure: 138 mmHg      Is BP treated: Yes      HDL Cholesterol: 57 mg/dL      Total Cholesterol: 221 mg/dL    Assessment and Plan:   Sjogren's syndrome, with unspecified organ involvement    SIENA on CPAP    Shortness of breath  -     Echo; Future    Primary hypertension  -     diltiaZEM (CARDIZEM CD) 180 MG 24 hr capsule; Take 1 capsule (180 mg total) by mouth once daily.  Dispense: 30 capsule; Refill: 11    Obesity (BMI 30.0-34.9)          Reviewed all tests and above medical conditions with patient in detail and formulated treatment plan.  Risk factor modification discussed.   Cardiac low salt diet discussed.  Maintaining healthy weight and weight loss goals were discussed in clinic.  Cpap compliance  Normal stress echo.  12.2022  BP not at  goal multiple occasions.  Will add Cardizem.  Patient was initially reluctant about that.  Follow up in 6-months

## 2024-10-15 DIAGNOSIS — Z00.6 RESEARCH SUBJECT: ICD-10-CM

## 2024-10-15 DIAGNOSIS — M35.00 SJOGREN'S SYNDROME, WITH UNSPECIFIED ORGAN INVOLVEMENT: Primary | ICD-10-CM

## 2024-10-16 ENCOUNTER — OFFICE VISIT (OUTPATIENT)
Dept: RHEUMATOLOGY | Facility: CLINIC | Age: 49
End: 2024-10-16
Payer: COMMERCIAL

## 2024-10-16 ENCOUNTER — PATIENT MESSAGE (OUTPATIENT)
Dept: CARDIOLOGY | Facility: CLINIC | Age: 49
End: 2024-10-16
Payer: COMMERCIAL

## 2024-10-16 ENCOUNTER — RESEARCH ENCOUNTER (OUTPATIENT)
Dept: RESEARCH | Facility: HOSPITAL | Age: 49
End: 2024-10-16
Payer: COMMERCIAL

## 2024-10-16 VITALS
HEART RATE: 88 BPM | BODY MASS INDEX: 33.03 KG/M2 | DIASTOLIC BLOOD PRESSURE: 77 MMHG | TEMPERATURE: 98 F | WEIGHT: 214.06 LBS | SYSTOLIC BLOOD PRESSURE: 121 MMHG

## 2024-10-16 DIAGNOSIS — M77.02 MEDIAL EPICONDYLITIS OF BOTH ELBOWS: ICD-10-CM

## 2024-10-16 DIAGNOSIS — M35.01 SJOGREN'S SYNDROME WITH KERATOCONJUNCTIVITIS SICCA: ICD-10-CM

## 2024-10-16 DIAGNOSIS — Z00.6 RESEARCH SUBJECT: Primary | ICD-10-CM

## 2024-10-16 DIAGNOSIS — M77.01 MEDIAL EPICONDYLITIS OF BOTH ELBOWS: ICD-10-CM

## 2024-10-16 DIAGNOSIS — Z87.898 HISTORY OF MULTIPLE PULMONARY NODULES: ICD-10-CM

## 2024-10-16 NOTE — PROGRESS NOTES
Visit: WEEK 16  Sponsor: [employer]   Title: -[id number]  IRB #: [id number]  : Neil Morales   Site: 0017  Others Present: N/A  Pt No: [id number]     Subject presents to the clinic for Week 16 visit. Subject comes in with increased shortness of breath that began [id number].     IRT contacted and visit was registered.     Concomitant medications reviewed with the patient and updated to ensure accuracy. Refer to Concomitant Medication Log in subject binder.     Vitals:   BP: 121/77 mm Hg  Pulse: 88 bpm  Respiratory Rate: 16 rr  Temp: 97.9 F  Weight: 97.1 kg    Is Subject of Childbearing Potential: No, subject is post-menopausal. Patient was advised about the risk of pregnancy during the course of the study. Patient verbalized understanding of study visit procedures.      Assessments:  eCOA tablet compliance was reviewed, patient completed all required questionnaires 7 days prior to visit.     Patient Assessment completed via TrialMax: Yes  Patient Sjogren's Syndrome Questionnaire completed: Yes, results uploaded into EDC and filed in subject file.  Physical assessment completed: Yes  Tender/Swollen Joint Count completed: Yes, results uploaded into EDC and filed in subject file.  Investigator Assessments completed: Yes, results uploaded into EDC and filed in subject file.     Labs:  Fasting Labs and urine were collected per protocol at 12:30 PM. All labs were processed and shipped per requisition. Requisition number: [id number]       Additional Testing:  Finger Stick RNA performed: No  Urine Pregnancy Test required: NO     IP Administration/Accountability:  Subject was educated on medication administration and handling. Informed subject to return any/all unused and empty bottles at each on-site visit. Verbalized understanding. Drug accountability completed by myself and research pharmacist. IP returned: [id number] - 4 tablets, [id number] - 12 tablets   Patient reported missing two half day  doses. [id number] -evening dose and [id number]- morning dose     IP dispensed: [id number], [id number]     Subject left clinic prior to receiving dispensed medication due to an emergency. Patient agreed to return to clinic tomorrow to receive study medication.      Adverse Events Reported: No     End of Visit:  Patient has the study-related emergency contacts. Subject advised to contact research staff immediately for any adverse effects and if adverse effects occur after hours to report immediately to the ER for treatment. Verbalized understanding.      Week 20 visit was scheduled with the subject.     Visit registered in OnCore.   ClinCard payment? Yes

## 2024-10-17 ENCOUNTER — DOCUMENTATION ONLY (OUTPATIENT)
Dept: RESEARCH | Facility: HOSPITAL | Age: 49
End: 2024-10-17
Payer: COMMERCIAL

## 2024-10-17 PROBLEM — M77.01 MEDIAL EPICONDYLITIS OF BOTH ELBOWS: Status: ACTIVE | Noted: 2024-10-17

## 2024-10-17 PROBLEM — Z87.898 HISTORY OF MULTIPLE PULMONARY NODULES: Status: ACTIVE | Noted: 2024-10-17

## 2024-10-17 PROBLEM — M77.02 MEDIAL EPICONDYLITIS OF BOTH ELBOWS: Status: ACTIVE | Noted: 2024-10-17

## 2024-10-17 NOTE — PROGRESS NOTES
Visit: UNSCHEDULED VISIT  Sponsor: [employer]  Title: -[id number]  IRB #: [id number]  : Neil Morales   Site: 0017  Others Present: NO  Pt No: [id number]       IP Administration  Subject returned to clinic today to for IP administration. On-site dose administered  at 12:21 PM. Patient tolerated well.     Subject advised to contact research staff immediately for any adverse effects and if adverse effects occur after hours to report immediately to the ER for treatment. Verbalized understanding.     Visit registered in OnCore.   ClinCard payment? Yes.

## 2024-10-17 NOTE — PROGRESS NOTES
RHEUMATOLOGY CLINIC ESTABLISHED PATIENT VISIT    Sponsor: [employer]   Title: -[id number]  IRB #: [id number]  : Neil Morales   Site: 0017  Others Present: N/A  Is LAR consenting for subject: No     Pt No: [id number]  Week 16.     Reason for consult:- Sjogren's syndrome; research subject    Chief complaints, HPI, ROS, EXAM, Assessment & Plans:-    Olesya Tay is a 49 y.o. pleasant female who presents to follow up for research visit today.  Currently in clinical trial for Sjogren's syndrome.  Denies any significant joint pain today other mild medial elbow pain with activities.  No joint swelling or prolonged morning stiffness.  No significant change in fatigue.   History of angioedema.  No recent angioedema.  Rheumatologic review of systems otherwise negative.      Physical Exam    Date completed: 10/17/2024    Physician: Neil Morales MD    Head: normal  Eyes: normal  Ears: normal  Nose: normal  Lungs: normal  Heart: normal  Musculoskeletal: abnormal - Mild bilateral medial epicondylitis of elbow.   Neurological: normal  Skin: normal  Lymph nodes: normal- Tender 1*1 cm left submandibular lymph node.  Mouth: normal  Gastrointestinal: normal    Reviewed all available old and outside pertinent medical records.    All lab results personally reviewed and interpreted by me.    1. Research subject    2. Sjogren's syndrome with keratoconjunctivitis sicca          Problem List Items Addressed This Visit       Research subject - Primary    Sjogren's syndrome with keratoconjunctivitis sicca         Patient following up today for management of Sjogren's disease  Positive SEAMUS, positive SSA, positive SSB, positive rheumatoid factor with dryness  Currently on hydroxychloroquine 200 mg daily and research medication.  Significant improvement of arthritis on research medication.  No significant change in fatigue.  Continuing low-dose naltrexone .  Mild medial epicondylitis . Try  conservative therapies before consulting sports medicine.   Continue hydroxychloroquine 200 mg daily.   Continue low-dose naltrexone  Research visit today for 28 joint count  Continue study drug        Past Medical History:   Diagnosis Date    Anemia     Constipation     Depression     Endometriosis of pelvis     GERD (gastroesophageal reflux disease)     Gestational diabetes mellitus in pregnancy     Heart murmur     Lung nodule     Osteoporosis     Sjogren's syndrome        Past Surgical History:   Procedure Laterality Date    COSMETIC SURGERY          Social History     Tobacco Use    Smoking status: Never    Smokeless tobacco: Never   Substance Use Topics    Alcohol use: Yes     Alcohol/week: 6.0 standard drinks of alcohol     Types: 6 Glasses of wine per week     Comment: socially    Drug use: Never       Family History   Problem Relation Name Age of Onset    Diabetes Mother Lali     Cataracts Mother Lali     Early death Mother Lali 52        hepatitis c    Arthritis Maternal Aunt Barbara         rheumatoid    Rheum arthritis Maternal Aunt Barbara     Osteoarthritis Maternal Aunt Barbara     Breast cancer Paternal Aunt Lisa Jj     Heart disease Maternal Grandfather      Macular degeneration Paternal Grandmother Hang     Cancer Paternal Grandmother Hang         stomach    Stroke Paternal Grandfather Don        Review of patient's allergies indicates:   Allergen Reactions    Doxycycline Shortness Of Breath     Elevated BP and shortness of breath    Shrimp Swelling    Prednisone Hives     Hives after taking medrol dose pack    Pcn [penicillins] Rash       Medication List with Changes/Refills   Current Medications    DILTIAZEM (CARDIZEM CD) 180 MG 24 HR CAPSULE    Take 1 capsule (180 mg total) by mouth once daily.    DIPHENHYDRAMINE (BENADRYL) 25 MG CAPSULE    Take 25 mg by mouth every 6 (six) hours as needed for Itching.    EPINEPHRINE (EPIPEN) 0.3 MG/0.3 ML ATIN    Inject 0.3 mLs (0.3 mg total) into the muscle  once. If symptoms not improved repeat in about 5-15 minutes - inject a second dose (pen) of 0.3 mL into muscle once. for 1 dose    ESOMEPRAZOLE (NEXIUM) 40 MG CAPSULE    Take 1 capsule (40 mg total) by mouth before breakfast.    ESTRADIOL (ESTRACE) 0.01 % (0.1 MG/GRAM) VAGINAL CREAM    Place 1 g vaginally once daily. For 14 days then apply 1 gram twice weekly    FLUTICASONE PROPIONATE (FLONASE) 50 MCG/ACTUATION NASAL SPRAY    1 SPRAY BY EACH NOSTRIL ONCE DAILY    HYDROXYCHLOROQUINE (PLAQUENIL) 200 MG TABLET    Take 1 tablet (200 mg total) by mouth once daily.    INV DEUCRAVACTINIB/PLACEBO TABLET    Take 2 tablets by mouth in the morning and 2 tablets by mouth in the evening as directed approximately 12 hours apart. For Investigational Use only. Protocol -1805 Subject ID#  [id number]    LORATADINE (CLARITIN) 10 MG TABLET    Take 10 mg by mouth once daily.    METFORMIN (GLUCOPHAGE-XR) 500 MG ER 24HR TABLET    Take 1 tablet (500 mg total) by mouth 2 (two) times daily with meals.    METRONIDAZOLE (METROGEL) 0.75 % GEL    AAA bid. For redness    NATREXONE TABLET 4.5 MG    Take 1 tablet (4.5 mg total) by mouth every evening.    NYSTATIN-TRIAMCINOLONE (MYCOLOG II) CREAM    Apply to affected area 2 times daily    SERTRALINE (ZOLOFT) 25 MG TABLET    Take 1 tablet (25 mg total) by mouth once daily.    TIRZEPATIDE, WEIGHT LOSS, (ZEPBOUND) 2.5 MG/0.5 ML SOLN    Inject 0.5 mLs (2.5 mg total) into the skin every 7 days.    ZOLPIDEM (AMBIEN) 5 MG TAB    Take 5 mg by mouth nightly as needed.         Disclaimer: This note was prepared using voice recognition system and is likely to have sound alike errors and is not proofread.  Please message me with any questions.

## 2024-11-05 ENCOUNTER — HOSPITAL ENCOUNTER (OUTPATIENT)
Dept: CARDIOLOGY | Facility: HOSPITAL | Age: 49
Discharge: HOME | End: 2024-11-05
Attending: INTERNAL MEDICINE
Payer: COMMERCIAL

## 2024-11-05 VITALS
BODY MASS INDEX: 33.59 KG/M2 | WEIGHT: 214 LBS | HEIGHT: 67 IN | SYSTOLIC BLOOD PRESSURE: 121 MMHG | DIASTOLIC BLOOD PRESSURE: 77 MMHG

## 2024-11-05 DIAGNOSIS — R06.02 SHORTNESS OF BREATH: ICD-10-CM

## 2024-11-05 LAB
AORTIC ROOT ANNULUS: 2.69 CM
ASCENDING AORTA: 2.53 CM
AV INDEX (PROSTH): 0.67
AV MEAN GRADIENT: 6.2 MMHG
AV PEAK GRADIENT: 10.2 MMHG
AV VALVE AREA BY VELOCITY RATIO: 2.2 CM²
AV VALVE AREA: 2.1 CM²
AV VELOCITY RATIO: 0.69
BSA FOR ECHO PROCEDURE: 2.14 M2
CV ECHO LV RWT: 0.36 CM
DOP CALC AO PEAK VEL: 1.6 M/S
DOP CALC AO VTI: 32.8 CM
DOP CALC LVOT AREA: 3.1 CM2
DOP CALC LVOT DIAMETER: 2 CM
DOP CALC LVOT PEAK VEL: 1.1 M/S
DOP CALC LVOT STROKE VOLUME: 69.1 CM3
DOP CALC RVOT PEAK VEL: 0.75 M/S
DOP CALC RVOT VTI: 15.8 CM
DOP CALCLVOT PEAK VEL VTI: 22 CM
E WAVE DECELERATION TIME: 190.55 MSEC
E/A RATIO: 0.91
E/E' RATIO: 6.8 M/S
ECHO LV POSTERIOR WALL: 0.9 CM (ref 0.6–1.1)
FRACTIONAL SHORTENING: 38 % (ref 28–44)
INTERVENTRICULAR SEPTUM: 1 CM (ref 0.6–1.1)
IVRT: 98.95 MSEC
LA MAJOR: 4.96 CM
LA MINOR: 4.99 CM
LA WIDTH: 3.7 CM
LEFT ATRIUM AREA SYSTOLIC (APICAL 2 CHAMBER): 19.26 CM2
LEFT ATRIUM AREA SYSTOLIC (APICAL 4 CHAMBER): 18.11 CM2
LEFT ATRIUM SIZE: 3.95 CM
LEFT ATRIUM VOLUME INDEX MOD: 27.2 ML/M2
LEFT ATRIUM VOLUME INDEX: 29.7 ML/M2
LEFT ATRIUM VOLUME MOD: 56.6 ML
LEFT ATRIUM VOLUME: 61.8 CM3
LEFT INTERNAL DIMENSION IN SYSTOLE: 3.1 CM (ref 2.1–4)
LEFT VENTRICLE DIASTOLIC VOLUME INDEX: 56.22 ML/M2
LEFT VENTRICLE DIASTOLIC VOLUME: 116.93 ML
LEFT VENTRICLE END SYSTOLIC VOLUME APICAL 2 CHAMBER: 61.44 ML
LEFT VENTRICLE END SYSTOLIC VOLUME APICAL 4 CHAMBER: 49.22 ML
LEFT VENTRICLE MASS INDEX: 81.7 G/M2
LEFT VENTRICLE SYSTOLIC VOLUME INDEX: 18.8 ML/M2
LEFT VENTRICLE SYSTOLIC VOLUME: 39.13 ML
LEFT VENTRICULAR INTERNAL DIMENSION IN DIASTOLE: 5 CM (ref 3.5–6)
LEFT VENTRICULAR MASS: 169.9 G
LV LATERAL E/E' RATIO: 6.18 M/S
LV SEPTAL E/E' RATIO: 7.56 M/S
LVED V (TEICH): 116.93 ML
LVES V (TEICH): 39.13 ML
LVOT MG: 2.78 MMHG
LVOT MV: 0.79 CM/S
MV PEAK A VEL: 0.75 M/S
MV PEAK E VEL: 0.68 M/S
PISA TR MAX VEL: 2.63 M/S
PV MEAN GRADIENT: 1 MMHG
PV PEAK GRADIENT: 2 MMHG
PV PEAK VELOCITY: 1.28 M/S
RA MAJOR: 4.81 CM
RA PRESSURE ESTIMATED: 3 MMHG
RA WIDTH: 3.62 CM
RIGHT VENTRICULAR END-DIASTOLIC DIMENSION: 3.28 CM
RV TB RVSP: 6 MMHG
SINUS: 2.7 CM
STJ: 2.34 CM
TDI LATERAL: 0.11 M/S
TDI SEPTAL: 0.09 M/S
TDI: 0.1 M/S
TR MAX PG: 28 MMHG
TRICUSPID ANNULAR PLANE SYSTOLIC EXCURSION: 2.11 CM
TV REST PULMONARY ARTERY PRESSURE: 31 MMHG
Z-SCORE OF LEFT VENTRICULAR DIMENSION IN END DIASTOLE: -2.42
Z-SCORE OF LEFT VENTRICULAR DIMENSION IN END SYSTOLE: -1.81

## 2024-11-05 PROCEDURE — 93306 TTE W/DOPPLER COMPLETE: CPT

## 2024-11-05 PROCEDURE — 93306 TTE W/DOPPLER COMPLETE: CPT | Mod: 26,,, | Performed by: INTERNAL MEDICINE

## 2024-11-06 DIAGNOSIS — I10 PRIMARY HYPERTENSION: ICD-10-CM

## 2024-11-12 ENCOUNTER — OFFICE VISIT (OUTPATIENT)
Dept: INTERNAL MEDICINE | Facility: CLINIC | Age: 49
End: 2024-11-12
Payer: COMMERCIAL

## 2024-11-12 VITALS
OXYGEN SATURATION: 95 % | HEIGHT: 67 IN | RESPIRATION RATE: 18 BRPM | BODY MASS INDEX: 33.71 KG/M2 | WEIGHT: 214.75 LBS | DIASTOLIC BLOOD PRESSURE: 72 MMHG | SYSTOLIC BLOOD PRESSURE: 122 MMHG | HEART RATE: 88 BPM

## 2024-11-12 DIAGNOSIS — R91.8 MULTIPLE PULMONARY NODULES: ICD-10-CM

## 2024-11-12 DIAGNOSIS — Z00.6 RESEARCH SUBJECT: ICD-10-CM

## 2024-11-12 DIAGNOSIS — M35.00 SJOGREN'S SYNDROME, WITH UNSPECIFIED ORGAN INVOLVEMENT: Primary | ICD-10-CM

## 2024-11-12 DIAGNOSIS — F33.0 MILD EPISODE OF RECURRENT MAJOR DEPRESSIVE DISORDER: ICD-10-CM

## 2024-11-12 DIAGNOSIS — M35.01 SJOGREN'S SYNDROME WITH KERATOCONJUNCTIVITIS SICCA: ICD-10-CM

## 2024-11-12 DIAGNOSIS — Z00.00 ROUTINE GENERAL MEDICAL EXAMINATION AT A HEALTH CARE FACILITY: Primary | ICD-10-CM

## 2024-11-12 DIAGNOSIS — R53.82 CHRONIC FATIGUE: ICD-10-CM

## 2024-11-12 DIAGNOSIS — G47.33 OSA ON CPAP: ICD-10-CM

## 2024-11-12 DIAGNOSIS — E66.811 OBESITY (BMI 30.0-34.9): ICD-10-CM

## 2024-11-12 DIAGNOSIS — K11.7 XEROSTOMIA DUE TO AUTOIMMUNE DISEASE: ICD-10-CM

## 2024-11-12 DIAGNOSIS — R76.8 RHEUMATOID FACTOR POSITIVE: ICD-10-CM

## 2024-11-12 DIAGNOSIS — M35.9 XEROSTOMIA DUE TO AUTOIMMUNE DISEASE: ICD-10-CM

## 2024-11-12 PROBLEM — Z86.32 HISTORY OF GESTATIONAL DIABETES: Status: RESOLVED | Noted: 2018-12-18 | Resolved: 2024-11-12

## 2024-11-12 PROBLEM — Z87.898 HISTORY OF MULTIPLE PULMONARY NODULES: Status: RESOLVED | Noted: 2024-10-17 | Resolved: 2024-11-12

## 2024-11-12 PROBLEM — Z87.898 HISTORY OF ANGIOEDEMA: Status: RESOLVED | Noted: 2024-09-17 | Resolved: 2024-11-12

## 2024-11-12 PROCEDURE — 99396 PREV VISIT EST AGE 40-64: CPT | Mod: 25,S$GLB,, | Performed by: FAMILY MEDICINE

## 2024-11-12 PROCEDURE — 1159F MED LIST DOCD IN RCRD: CPT | Mod: CPTII,S$GLB,, | Performed by: FAMILY MEDICINE

## 2024-11-12 PROCEDURE — 1160F RVW MEDS BY RX/DR IN RCRD: CPT | Mod: CPTII,S$GLB,, | Performed by: FAMILY MEDICINE

## 2024-11-12 PROCEDURE — 99999 PR PBB SHADOW E&M-EST. PATIENT-LVL IV: CPT | Mod: PBBFAC,,, | Performed by: FAMILY MEDICINE

## 2024-11-12 PROCEDURE — 3074F SYST BP LT 130 MM HG: CPT | Mod: CPTII,S$GLB,, | Performed by: FAMILY MEDICINE

## 2024-11-12 PROCEDURE — 3078F DIAST BP <80 MM HG: CPT | Mod: CPTII,S$GLB,, | Performed by: FAMILY MEDICINE

## 2024-11-12 PROCEDURE — 3008F BODY MASS INDEX DOCD: CPT | Mod: CPTII,S$GLB,, | Performed by: FAMILY MEDICINE

## 2024-11-12 RX ORDER — SERTRALINE HYDROCHLORIDE 50 MG/1
50 TABLET, FILM COATED ORAL DAILY
Qty: 90 TABLET | Refills: 1 | Status: SHIPPED | OUTPATIENT
Start: 2024-11-12

## 2024-11-12 NOTE — PROGRESS NOTES
"Subjective:       Patient ID: Olesya Tay is a 49 y.o. female presents with   Patient Active Problem List   Diagnosis    Sjogren's syndrome with keratoconjunctivitis sicca    Rheumatoid factor positive    Multiple pulmonary nodules    Rosacea    Encounter for long-term current use of high risk medication    Xerostomia due to autoimmune disease    Chronic fatigue    Hair loss    Obesity (BMI 30.0-34.9)    SIENA on CPAP    Insomnia    Research subject    Long-term use of Plaquenil    Medial epicondylitis of both elbows        Chief Complaint: Annual Exam    History of Present Illness    Olesya presents today for routine annual physicals and follow-up on various health concerns. She reports fluctuating fatigue levels, describing "good days and bad days." She is participating in a research study and taking an investigational medication, which she believes may be contributing to her fatigue. She also experiences joint pain, with her current medication regimen providing mild relief.  The rheumatologist is collaborating with the allergist to determine if the swelling is related to Sjögren's syndrome. She takes Zoloft (two pills at night) for depression  which she feels is more effective than the previous 25mg dose. She has been actively managing her weight through diet and exercise, including walking 3 times per week and focusing on protein intake.       ROS:  General: -fever, -chills, +fatigue, -weight gain, -weight loss, -loss of appetite  Eyes: -vision changes, -blurry vision, -eye pain, -eye discharge  ENT: -ear pain, -hearing loss, -tinnitus, -nasal congestion, -sore throat  Cardiovascular: -chest pain, -palpitations, -lower extremity edema  Respiratory: -cough, +shortness of breath, -wheezing, -sputum production  Endocrine: -polyuria, -polydipsia, -heat intolerance, -cold intolerance  Gastrointestinal: -abdominal pain, -heartburn, -nausea, -vomiting, -diarrhea, -constipation, -blood in stool  Genitourinary: " "-dysuria, -urgency, -frequency, -hematuria, -nocturia, -incontinence  Heme & Lymphatic: -easy or excessive bleeding, -easy bruising, -swollen lymph nodes  Musculoskeletal: -muscle pain, -back pain, +joint pain, -joint swelling  Skin: -rash, -lesion, -itching, -skin texture changes, -skin color changes  Neurological: -headache, -dizziness, -numbness, -tingling, -seizure activity, -speech difficulty, -memory loss, -confusion  Psychiatric: -anxiety, -depression, -sleep difficulty  Head: +facial swelling                /72 (BP Location: Left arm, Patient Position: Sitting)   Pulse 88   Resp 18   Ht 5' 7" (1.702 m)   Wt 97.4 kg (214 lb 11.7 oz)   LMP 12/06/2023   SpO2 95%   BMI 33.63 kg/m²   Objective:      Physical Exam  Constitutional:       Appearance: She is well-developed.   HENT:      Head: Normocephalic and atraumatic.   Cardiovascular:      Rate and Rhythm: Normal rate and regular rhythm.      Heart sounds: Normal heart sounds. No murmur heard.  Pulmonary:      Effort: Pulmonary effort is normal.      Breath sounds: Normal breath sounds. No wheezing.   Abdominal:      General: Bowel sounds are normal.      Palpations: Abdomen is soft.      Tenderness: There is no abdominal tenderness.   Skin:     General: Skin is warm and dry.      Findings: No rash.   Neurological:      Mental Status: She is alert and oriented to person, place, and time.   Psychiatric:         Mood and Affect: Mood normal.           Assessment/Plan:   1. Routine general medical examination at a health care facility  -     Urinalysis, Reflex to Urine Culture Urine, Clean Catch; Future; Expected date: 11/12/2024  -     Hemoglobin A1C; Future; Expected date: 11/12/2024  -     TSH; Future; Expected date: 11/12/2024  -     Lipid Panel; Future; Expected date: 11/12/2024  -     Comprehensive Metabolic Panel; Future; Expected date: 11/12/2024  -     CBC Auto Differential; Future; Expected date: 11/12/2024  Vital signs stable today.  " Clinical exam stable  Continue lifestyle modifications with low-fat and low-cholesterol diet and exercise 30 minutes daily    2. Sjogren's syndrome with keratoconjunctivitis sicca  3. Rheumatoid factor positive  Overview:  CCP + 6.3 (low)  RF 21.0- (low)  4. 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 Multiple pulmonary nodules  Clinically stable and asymptomatic    6. Obesity (BMI 30.0-34.9)  Lifestyle modifications recommended to lose weight with BMI 33    7. SIENA on CPAP  Stable    8. Chronic fatigue  Encouraged to eat protein rich diet  Secondary to PVCs has been started on diltiazem 180 mg daily and has been doing well    9. Mild episode of recurrent major depressive disorder  -     sertraline (ZOLOFT) 50 MG tablet; Take 1 tablet (50 mg total) by mouth once daily.  Dispense: 90 tablet; Refill: 1  Will increase Zoloft from 25-50 mg as patient has been taking 2 tablets of 25 mg and has been helpful    Flu shot given today  Encouraged to consider getting shingles and COVID booster if interested outside pharmacy         This note was generated with the assistance of ambient listening technology. Verbal consent was obtained by the patient and accompanying visitor(s) for the recording of patient appointment to facilitate this note. I attest to having reviewed and edited the generated note for accuracy, though some syntax or spelling errors may persist. Please contact the author of this note for any clarification.

## 2024-11-13 ENCOUNTER — LAB VISIT (OUTPATIENT)
Dept: LAB | Facility: HOSPITAL | Age: 49
End: 2024-11-13
Attending: FAMILY MEDICINE
Payer: COMMERCIAL

## 2024-11-13 ENCOUNTER — OFFICE VISIT (OUTPATIENT)
Dept: RHEUMATOLOGY | Facility: CLINIC | Age: 49
End: 2024-11-13
Payer: COMMERCIAL

## 2024-11-13 ENCOUNTER — RESEARCH ENCOUNTER (OUTPATIENT)
Dept: RESEARCH | Facility: HOSPITAL | Age: 49
End: 2024-11-13
Payer: COMMERCIAL

## 2024-11-13 VITALS
WEIGHT: 215.38 LBS | TEMPERATURE: 98 F | DIASTOLIC BLOOD PRESSURE: 74 MMHG | HEART RATE: 68 BPM | SYSTOLIC BLOOD PRESSURE: 110 MMHG | HEIGHT: 67 IN | BODY MASS INDEX: 33.8 KG/M2 | RESPIRATION RATE: 16 BRPM

## 2024-11-13 DIAGNOSIS — M35.00 SJOGREN'S SYNDROME, WITH UNSPECIFIED ORGAN INVOLVEMENT: ICD-10-CM

## 2024-11-13 DIAGNOSIS — I10 PRIMARY HYPERTENSION: ICD-10-CM

## 2024-11-13 DIAGNOSIS — Z00.6 ENCOUNTER FOR EXAMINATION FOR NORMAL COMPARISON AND CONTROL IN CLINICAL RESEARCH PROGRAM: ICD-10-CM

## 2024-11-13 DIAGNOSIS — M35.00 SJOGREN'S SYNDROME, WITH UNSPECIFIED ORGAN INVOLVEMENT: Primary | ICD-10-CM

## 2024-11-13 DIAGNOSIS — Z00.6 RESEARCH SUBJECT: Primary | ICD-10-CM

## 2024-11-13 DIAGNOSIS — Z00.00 ROUTINE GENERAL MEDICAL EXAMINATION AT A HEALTH CARE FACILITY: ICD-10-CM

## 2024-11-13 LAB
ALBUMIN SERPL BCP-MCNC: 3.9 G/DL (ref 3.5–5.2)
ALBUMIN SERPL BCP-MCNC: 3.9 G/DL (ref 3.5–5.2)
ALP SERPL-CCNC: 90 U/L (ref 40–150)
ALP SERPL-CCNC: 90 U/L (ref 40–150)
ALT SERPL W/O P-5'-P-CCNC: 16 U/L (ref 10–44)
ALT SERPL W/O P-5'-P-CCNC: 16 U/L (ref 10–44)
ANION GAP SERPL CALC-SCNC: 12 MMOL/L (ref 8–16)
ANION GAP SERPL CALC-SCNC: 12 MMOL/L (ref 8–16)
AST SERPL-CCNC: 18 U/L (ref 10–40)
AST SERPL-CCNC: 18 U/L (ref 10–40)
BASOPHILS # BLD AUTO: 0.02 K/UL (ref 0–0.2)
BASOPHILS NFR BLD: 0.4 % (ref 0–1.9)
BILIRUB SERPL-MCNC: 0.5 MG/DL (ref 0.1–1)
BILIRUB SERPL-MCNC: 0.5 MG/DL (ref 0.1–1)
BUN SERPL-MCNC: 11 MG/DL (ref 6–20)
BUN SERPL-MCNC: 11 MG/DL (ref 6–20)
CALCIUM SERPL-MCNC: 9.8 MG/DL (ref 8.7–10.5)
CALCIUM SERPL-MCNC: 9.8 MG/DL (ref 8.7–10.5)
CHLORIDE SERPL-SCNC: 103 MMOL/L (ref 95–110)
CHLORIDE SERPL-SCNC: 103 MMOL/L (ref 95–110)
CHOLEST SERPL-MCNC: 237 MG/DL (ref 120–199)
CHOLEST/HDLC SERPL: 3.9 {RATIO} (ref 2–5)
CO2 SERPL-SCNC: 25 MMOL/L (ref 23–29)
CO2 SERPL-SCNC: 25 MMOL/L (ref 23–29)
CREAT SERPL-MCNC: 0.8 MG/DL (ref 0.5–1.4)
CREAT SERPL-MCNC: 0.8 MG/DL (ref 0.5–1.4)
DIFFERENTIAL METHOD BLD: ABNORMAL
DRUG STUDY TEST NAME: NORMAL
DRUG STUDY TEST RESULT: NORMAL
EOSINOPHIL # BLD AUTO: 0.2 K/UL (ref 0–0.5)
EOSINOPHIL NFR BLD: 3.3 % (ref 0–8)
ERYTHROCYTE [DISTWIDTH] IN BLOOD BY AUTOMATED COUNT: 13.2 % (ref 11.5–14.5)
EST. GFR  (NO RACE VARIABLE): >60 ML/MIN/1.73 M^2
EST. GFR  (NO RACE VARIABLE): >60 ML/MIN/1.73 M^2
ESTIMATED AVG GLUCOSE: 100 MG/DL (ref 68–131)
GLUCOSE SERPL-MCNC: 90 MG/DL (ref 70–110)
GLUCOSE SERPL-MCNC: 90 MG/DL (ref 70–110)
HBA1C MFR BLD: 5.1 % (ref 4–5.6)
HCT VFR BLD AUTO: 41.5 % (ref 37–48.5)
HDLC SERPL-MCNC: 61 MG/DL (ref 40–75)
HDLC SERPL: 25.7 % (ref 20–50)
HGB BLD-MCNC: 13.8 G/DL (ref 12–16)
IMM GRANULOCYTES # BLD AUTO: 0.01 K/UL (ref 0–0.04)
IMM GRANULOCYTES NFR BLD AUTO: 0.2 % (ref 0–0.5)
LDLC SERPL CALC-MCNC: 151.6 MG/DL (ref 63–159)
LYMPHOCYTES # BLD AUTO: 1 K/UL (ref 1–4.8)
LYMPHOCYTES NFR BLD: 22.6 % (ref 18–48)
MCH RBC QN AUTO: 30.3 PG (ref 27–31)
MCHC RBC AUTO-ENTMCNC: 33.3 G/DL (ref 32–36)
MCV RBC AUTO: 91 FL (ref 82–98)
MONOCYTES # BLD AUTO: 0.4 K/UL (ref 0.3–1)
MONOCYTES NFR BLD: 8.6 % (ref 4–15)
NEUTROPHILS # BLD AUTO: 2.9 K/UL (ref 1.8–7.7)
NEUTROPHILS NFR BLD: 64.9 % (ref 38–73)
NONHDLC SERPL-MCNC: 176 MG/DL
NRBC BLD-RTO: 0 /100 WBC
PLATELET # BLD AUTO: 328 K/UL (ref 150–450)
PMV BLD AUTO: 8.9 FL (ref 9.2–12.9)
POTASSIUM SERPL-SCNC: 3.8 MMOL/L (ref 3.5–5.1)
POTASSIUM SERPL-SCNC: 3.8 MMOL/L (ref 3.5–5.1)
PROT SERPL-MCNC: 7.8 G/DL (ref 6–8.4)
PROT SERPL-MCNC: 7.8 G/DL (ref 6–8.4)
RBC # BLD AUTO: 4.55 M/UL (ref 4–5.4)
SODIUM SERPL-SCNC: 140 MMOL/L (ref 136–145)
SODIUM SERPL-SCNC: 140 MMOL/L (ref 136–145)
TRIGL SERPL-MCNC: 122 MG/DL (ref 30–150)
TSH SERPL DL<=0.005 MIU/L-ACNC: 2.68 UIU/ML (ref 0.4–4)
WBC # BLD AUTO: 4.51 K/UL (ref 3.9–12.7)

## 2024-11-13 PROCEDURE — 85025 COMPLETE CBC W/AUTO DIFF WBC: CPT | Performed by: FAMILY MEDICINE

## 2024-11-13 PROCEDURE — 80061 LIPID PANEL: CPT | Performed by: FAMILY MEDICINE

## 2024-11-13 PROCEDURE — 83036 HEMOGLOBIN GLYCOSYLATED A1C: CPT | Performed by: FAMILY MEDICINE

## 2024-11-13 PROCEDURE — 80053 COMPREHEN METABOLIC PANEL: CPT | Performed by: FAMILY MEDICINE

## 2024-11-13 PROCEDURE — 84443 ASSAY THYROID STIM HORMONE: CPT | Performed by: FAMILY MEDICINE

## 2024-11-13 NOTE — PROGRESS NOTES
RHEUMATOLOGY CLINIC ESTABLISHED PATIENT VISIT    Sponsor: [employer]   Title: -6815  IRB #: [id number]  : Neil Morales   Site: [id number]  Others Present: N/A  Is LAR consenting for subject: No     Pt No: [id number]    Reason for consult:- Sjogren's syndrome; research subject    Chief complaints, HPI, ROS, EXAM, Assessment & Plans:-    Olesya Tay is a 49 y.o. pleasant female who presents to follow up for research visit today.  Currently in clinical trial for Sjogren's syndrome.  Denies any significant joint pain today . No joint swelling or prolonged morning stiffness.  No significant change in fatigue.   History of angioedema.  No recent angioedema.  Rheumatologic review of systems otherwise negative.      Physical Exam    Date completed: 11/13/2024    Physician: Neil Morales MD    Head: normal  Eyes: normal  Ears: normal  Nose: normal  Lungs: normal  Heart: normal  Musculoskeletal: normal  Neurological: normal  Skin: normal  Lymph nodes: normal- Tender 1*1 cm left submandibular lymph node.  Mouth: normal  Gastrointestinal: normal    Reviewed all available old and outside pertinent medical records.    All lab results personally reviewed and interpreted by me.    1. Research subject    2. Sjogren's syndrome, with unspecified organ involvement          Problem List Items Addressed This Visit       Research subject - Primary     Other Visit Diagnoses       Sjogren's syndrome, with unspecified organ involvement                  Patient following up today for management of Sjogren's disease  Positive SEAMUS, positive SSA, positive SSB, positive rheumatoid factor with dryness  Currently on hydroxychloroquine 200 mg daily and research medication.  Significant improvement of arthritis on research medication.  No significant change in fatigue.  Continuing low-dose naltrexone .  Continue hydroxychloroquine 200 mg daily.   Continue low-dose naltrexone  Research visit today  for 28 joint count  Continue study drug        Past Medical History:   Diagnosis Date    Anemia     Constipation     Depression     Endometriosis of pelvis     GERD (gastroesophageal reflux disease)     Gestational diabetes mellitus in pregnancy     Heart murmur     Lung nodule     Osteoporosis     Sjogren's syndrome        Past Surgical History:   Procedure Laterality Date    COSMETIC SURGERY          Social History     Tobacco Use    Smoking status: Never    Smokeless tobacco: Never   Substance Use Topics    Alcohol use: Yes     Alcohol/week: 6.0 standard drinks of alcohol     Types: 6 Glasses of wine per week     Comment: socially    Drug use: Never       Family History   Problem Relation Name Age of Onset    Diabetes Mother Lali     Cataracts Mother Lali     Early death Mother Lali 52        hepatitis c    Arthritis Maternal Aunt Barbara         rheumatoid    Rheum arthritis Maternal Aunt Barbara     Osteoarthritis Maternal Aunt Barbara     Breast cancer Paternal Aunt Lisa Jj     Heart disease Maternal Grandfather      Macular degeneration Paternal Grandmother Hang     Cancer Paternal Grandmother Hagn         stomach    Stroke Paternal Grandfather Don        Review of patient's allergies indicates:   Allergen Reactions    Doxycycline Shortness Of Breath     Elevated BP and shortness of breath    Shrimp Swelling    Prednisone Hives     Hives after taking medrol dose pack    Pcn [penicillins] Rash       Medication List with Changes/Refills   Current Medications    DILTIAZEM (CARDIZEM CD) 180 MG 24 HR CAPSULE    Take 1 capsule (180 mg total) by mouth once daily.    DIPHENHYDRAMINE (BENADRYL) 25 MG CAPSULE    Take 25 mg by mouth every 6 (six) hours as needed for Itching.    EPINEPHRINE (EPIPEN) 0.3 MG/0.3 ML ATIN    Inject 0.3 mLs (0.3 mg total) into the muscle once. If symptoms not improved repeat in about 5-15 minutes - inject a second dose (pen) of 0.3 mL into muscle once. for 1 dose    ESOMEPRAZOLE (NEXIUM) 40 MG  CAPSULE    Take 1 capsule (40 mg total) by mouth before breakfast.    ESTRADIOL (ESTRACE) 0.01 % (0.1 MG/GRAM) VAGINAL CREAM    Place 1 g vaginally once daily. For 14 days then apply 1 gram twice weekly    HYDROXYCHLOROQUINE (PLAQUENIL) 200 MG TABLET    Take 1 tablet (200 mg total) by mouth once daily.    INV DEUCRAVACTINIB/PLACEBO TABLET    Take 2 tablets by mouth in the morning and 2 tablets by mouth in the evening as directed approximately 12 hours apart. For Investigational Use only. Protocol -7251 Subject ID#  [id number]    LORATADINE (CLARITIN) 10 MG TABLET    Take 10 mg by mouth once daily.    METFORMIN (GLUCOPHAGE-XR) 500 MG ER 24HR TABLET    Take 1 tablet (500 mg total) by mouth 2 (two) times daily with meals.    NATREXONE TABLET 4.5 MG    Take 1 tablet (4.5 mg total) by mouth every evening.    SERTRALINE (ZOLOFT) 50 MG TABLET    Take 1 tablet (50 mg total) by mouth once daily.         Disclaimer: This note was prepared using voice recognition system and is likely to have sound alike errors and is not proofread.  Please message me with any questions.

## 2024-11-13 NOTE — PROGRESS NOTES
Visit: WEEK 20  Sponsor: [employer]   Title: -4177  IRB #: [id number]  : Neil Morales   Site: 0017  Others Present: N/A  Pt No: [id number]    Subject presents to the clinic for Week 20 visit. Subject reports no complaints at this time.     IRT contacted and visit was registered.     Concomitant medications reviewed with the patient and updated to ensure accuracy. Refer to Concomitant Medication Log in subject binder.    Vitals:   BP: 110/74  Pulse: 68  Respiratory Rate: 18  Temp: 98.3  Weight: 97.7  Is Subject of Childbearing Potential: No  Patient was advised about the risk of pregnancy during the course of the study. Patient verbalized understanding of study visit procedures.     Assessments:  eCOA tablet compliance was reviewed, patient completed all required questionnaires.     Patient Assessment completed via TrialMax: Yes  Patient Sjogren's Syndrome Questionnaire completed: Yes, results uploaded into EDC and filed in subject file.  Physical assessment completed: Yes  Tender/Swollen Joint Count completed: Yes , results uploaded into EDC and filed in subject file.  Investigator Assessments completed: Yes, results uploaded into EDC and filed in subject file.    Labs:  Fasting Labs and urine were collected per protocol at 9:30 AM. All labs were processed and shipped per requisition. Requisition number: [id number]  Additional Testing:  Urine Pregnancy Test required:  NO  IP Administration/Accountability:   Drug accountability completed by myself and research pharmacist. IP returned: 897566- 4 tablets 407181- 16 tablets  Subject reports missing 1 full day due to family emergency that occurred on 16OCT2024    IP dispensed: 905880, 635911    Adverse Events Reported: No    End of Visit:  Patient has the study-related emergency contacts. Subject advised to contact research staff immediately for any adverse effects and if adverse effects occur after hours to report immediately to  the ER for treatment. Verbalized understanding.      Week 24 visit was scheduled with the subject.     Visit registered in [employer].   [employer] payment? Yes

## 2024-11-18 ENCOUNTER — PATIENT MESSAGE (OUTPATIENT)
Dept: INTERNAL MEDICINE | Facility: CLINIC | Age: 49
End: 2024-11-18
Payer: COMMERCIAL

## 2024-11-18 DIAGNOSIS — E66.811 OBESITY (BMI 30.0-34.9): ICD-10-CM

## 2024-11-18 DIAGNOSIS — K21.9 GASTROESOPHAGEAL REFLUX DISEASE, UNSPECIFIED WHETHER ESOPHAGITIS PRESENT: ICD-10-CM

## 2024-11-18 RX ORDER — TIRZEPATIDE 2.5 MG/.5ML
2.5 INJECTION, SOLUTION SUBCUTANEOUS
Qty: 3 ML | Refills: 1 | Status: SHIPPED | OUTPATIENT
Start: 2024-11-18

## 2024-11-18 RX ORDER — ESOMEPRAZOLE MAGNESIUM 40 MG/1
40 CAPSULE, DELAYED RELEASE ORAL
Qty: 90 CAPSULE | Refills: 3 | Status: SHIPPED | OUTPATIENT
Start: 2024-11-18

## 2024-11-18 NOTE — TELEPHONE ENCOUNTER
No care due was identified.  [facility] Embedded Care Due Messages. Reference number: [id number].   11/18/2024 9:13:21 AM CST

## 2024-11-18 NOTE — TELEPHONE ENCOUNTER
Refill Decision Note   Olesya Jasvir  is requesting a refill authorization.  Brief Assessment and Rationale for Refill:  Approve     Medication Therapy Plan:        Comments:     Note composed:10:24 AM 11/18/2024

## 2024-11-25 ENCOUNTER — LAB VISIT (OUTPATIENT)
Dept: LAB | Facility: HOSPITAL | Age: 49
End: 2024-11-25
Attending: ALLERGY & IMMUNOLOGY
Payer: COMMERCIAL

## 2024-11-25 ENCOUNTER — OFFICE VISIT (OUTPATIENT)
Dept: ALLERGY | Facility: CLINIC | Age: 49
End: 2024-11-25
Payer: COMMERCIAL

## 2024-11-25 VITALS
SYSTOLIC BLOOD PRESSURE: 128 MMHG | WEIGHT: 215.38 LBS | TEMPERATURE: 98 F | HEART RATE: 71 BPM | RESPIRATION RATE: 18 BRPM | BODY MASS INDEX: 33.8 KG/M2 | HEIGHT: 67 IN | DIASTOLIC BLOOD PRESSURE: 83 MMHG

## 2024-11-25 DIAGNOSIS — J31.0 NON-ALLERGIC RHINITIS: Primary | ICD-10-CM

## 2024-11-25 DIAGNOSIS — Z87.898 HISTORY OF ANGIOEDEMA: ICD-10-CM

## 2024-11-25 PROCEDURE — 3079F DIAST BP 80-89 MM HG: CPT | Mod: CPTII,S$GLB,, | Performed by: ALLERGY & IMMUNOLOGY

## 2024-11-25 PROCEDURE — 86800 THYROGLOBULIN ANTIBODY: CPT | Performed by: ALLERGY & IMMUNOLOGY

## 2024-11-25 PROCEDURE — 3074F SYST BP LT 130 MM HG: CPT | Mod: CPTII,S$GLB,, | Performed by: ALLERGY & IMMUNOLOGY

## 2024-11-25 PROCEDURE — 84165 PROTEIN E-PHORESIS SERUM: CPT | Performed by: ALLERGY & IMMUNOLOGY

## 2024-11-25 PROCEDURE — 86008 ALLG SPEC IGE RECOMB EA: CPT | Performed by: ALLERGY & IMMUNOLOGY

## 2024-11-25 PROCEDURE — 83520 IMMUNOASSAY QUANT NOS NONAB: CPT | Performed by: ALLERGY & IMMUNOLOGY

## 2024-11-25 PROCEDURE — 86160 COMPLEMENT ANTIGEN: CPT | Performed by: ALLERGY & IMMUNOLOGY

## 2024-11-25 PROCEDURE — 3044F HG A1C LEVEL LT 7.0%: CPT | Mod: CPTII,S$GLB,, | Performed by: ALLERGY & IMMUNOLOGY

## 2024-11-25 PROCEDURE — 99215 OFFICE O/P EST HI 40 MIN: CPT | Mod: S$GLB,,, | Performed by: ALLERGY & IMMUNOLOGY

## 2024-11-25 PROCEDURE — 85280 CLOT FACTOR XII HAGEMAN: CPT | Performed by: ALLERGY & IMMUNOLOGY

## 2024-11-25 PROCEDURE — 86161 COMPLEMENT/FUNCTION ACTIVITY: CPT | Performed by: ALLERGY & IMMUNOLOGY

## 2024-11-25 PROCEDURE — 86003 ALLG SPEC IGE CRUDE XTRC EA: CPT | Performed by: ALLERGY & IMMUNOLOGY

## 2024-11-25 PROCEDURE — 3008F BODY MASS INDEX DOCD: CPT | Mod: CPTII,S$GLB,, | Performed by: ALLERGY & IMMUNOLOGY

## 2024-11-25 PROCEDURE — 99999 PR PBB SHADOW E&M-EST. PATIENT-LVL V: CPT | Mod: PBBFAC,,, | Performed by: ALLERGY & IMMUNOLOGY

## 2024-11-25 PROCEDURE — 36415 COLL VENOUS BLD VENIPUNCTURE: CPT | Performed by: ALLERGY & IMMUNOLOGY

## 2024-11-25 PROCEDURE — 1159F MED LIST DOCD IN RCRD: CPT | Mod: CPTII,S$GLB,, | Performed by: ALLERGY & IMMUNOLOGY

## 2024-11-25 PROCEDURE — 86332 IMMUNE COMPLEX ASSAY: CPT | Performed by: ALLERGY & IMMUNOLOGY

## 2024-11-25 PROCEDURE — 88184 FLOWCYTOMETRY/ TC 1 MARKER: CPT | Performed by: ALLERGY & IMMUNOLOGY

## 2024-11-25 PROCEDURE — 84165 PROTEIN E-PHORESIS SERUM: CPT | Mod: 26,,, | Performed by: PATHOLOGY

## 2024-11-25 RX ORDER — EPINEPHRINE 0.3 MG/.3ML
1 INJECTION SUBCUTANEOUS ONCE
Qty: 2 EACH | Refills: 2 | Status: SHIPPED | OUTPATIENT
Start: 2024-11-25 | End: 2024-11-25

## 2024-11-25 NOTE — PROGRESS NOTES
Subjective:      Patient ID: Olesya Tay is a 49 y.o. female.    Chief Complaint:  Follow-up      HPI:  49 year old female referred by Dr. Morales  Seen by Lacey gillis NP in 2022 for shrimp allergy  Facial swelling    3 years ago- seafood gumbo, difficulty breathing- now eating shrimp without symptoms    24- 48 hours after eating shrimp- facial swelling- last time occurred with shrimp after eating shrimp recently- one month ago and she has eaten shrimp the day before. She reports that symptoms last a week. NO difficulty breathing.  No used epipen   She has one.      Eating crab - no swelling.  NO symptoms with oysters    3 years ago- [place]- oysters and shrimp-swelling the next day      Sjogren's syndrome      Hives with medrol dose pack- 4- 5 years ago        Past Medical History:   Diagnosis Date    Anemia     Constipation     Depression     Endometriosis of pelvis     GERD (gastroesophageal reflux disease)     Gestational diabetes mellitus in pregnancy     Heart murmur     Lung nodule     Osteoporosis     Sjogren's syndrome         Family History   Problem Relation Name Age of Onset    Diabetes Mother Lali     Cataracts Mother Lali     Early death Mother Lali 52        hepatitis c    Arthritis Maternal Aunt Barbara         rheumatoid    Rheum arthritis Maternal Aunt Barbara     Osteoarthritis Maternal Aunt Barbara     Breast cancer Paternal Aunt Lisa Gardner     Heart disease Maternal Grandfather      Macular degeneration Paternal Grandmother Hang     Cancer Paternal Grandmother Hang         stomach    Stroke Paternal Grandfather Don         Current Outpatient Medications on File Prior to Visit   Medication Sig Dispense Refill    diltiaZEM (CARDIZEM CD) 180 MG 24 hr capsule Take 1 capsule (180 mg total) by mouth once daily. 30 capsule 11    diphenhydrAMINE (BENADRYL) 25 mg capsule Take 25 mg by mouth every 6 (six) hours as needed for Itching.      esomeprazole (NEXIUM) 40 MG capsule TAKE 1 CAPSULE  BY MOUTH ONCE DAILY BEFORE BREAKFAST. 90 capsule 3    estradioL (ESTRACE) 0.01 % (0.1 mg/gram) vaginal cream Place 1 g vaginally once daily. For 14 days then apply 1 gram twice weekly 42.5 g 6    hydroxychloroquine (PLAQUENIL) 200 mg tablet Take 1 tablet (200 mg total) by mouth once daily. 90 tablet 3    INV deucravactinib/placebo tablet Take 2 tablets by mouth in the morning and 2 tablets by mouth in the evening as directed approximately 12 hours apart. For Investigational Use only. Protocol -6726 Subject ID#  [id number] 128 tablet 0    loratadine (CLARITIN) 10 mg tablet Take 10 mg by mouth once daily.      natrexone tablet 4.5 mg Take 1 tablet (4.5 mg total) by mouth every evening. 90 tablet 3    sertraline (ZOLOFT) 50 MG tablet Take 1 tablet (50 mg total) by mouth once daily. 90 tablet 1    tirzepatide, weight loss, (ZEPBOUND) 2.5 mg/0.5 mL Soln Inject 0.5 mLs (2.5 mg total) into the skin every 7 days. 3 mL 1    metFORMIN (GLUCOPHAGE-XR) 500 MG ER 24hr tablet Take 1 tablet (500 mg total) by mouth 2 (two) times daily with meals. 180 tablet 3    [DISCONTINUED] EPINEPHrine (EPIPEN) 0.3 mg/0.3 mL AtIn Inject 0.3 mLs (0.3 mg total) into the muscle once. If symptoms not improved repeat in about 5-15 minutes - inject a second dose (pen) of 0.3 mL into muscle once. for 1 dose 2 each 2     No current facility-administered medications on file prior to visit.                Environmental History: not applicable  Review of Systems   Constitutional:  Negative for chills and fever.   HENT:  Positive for rhinorrhea. Negative for congestion.    Eyes:  Negative for discharge and itching.   Skin:  Positive for rash.   Allergic/Immunologic: Positive for environmental allergies and food allergies.   Neurological:  Negative for facial asymmetry and speech difficulty.   Psychiatric/Behavioral:  Negative for behavioral problems and suicidal ideas.        Objective:   Physical Exam  Vitals reviewed.   Constitutional:       General:  She is not in acute distress.     Appearance: Normal appearance. She is well-developed. She is not ill-appearing, toxic-appearing or diaphoretic.   HENT:      Head: Normocephalic and atraumatic.      Right Ear: Tympanic membrane, ear canal and external ear normal. There is no impacted cerumen.      Left Ear: Tympanic membrane, ear canal and external ear normal. There is no impacted cerumen.      Nose: Nose normal. No congestion or rhinorrhea.      Mouth/Throat:      Pharynx: No oropharyngeal exudate or posterior oropharyngeal erythema.   Eyes:      General: No scleral icterus.        Right eye: No discharge.         Left eye: No discharge.      Pupils: Pupils are equal, round, and reactive to light.   Neck:      Thyroid: No thyromegaly.   Cardiovascular:      Rate and Rhythm: Normal rate and regular rhythm.      Heart sounds: Normal heart sounds. No murmur heard.     No friction rub. No gallop.   Pulmonary:      Effort: Pulmonary effort is normal. No respiratory distress.      Breath sounds: Normal breath sounds. No stridor. No wheezing, rhonchi or rales.   Chest:      Chest wall: No tenderness.   Musculoskeletal:         General: No swelling, tenderness, deformity or signs of injury. Normal range of motion.      Cervical back: Normal range of motion and neck supple. No rigidity or tenderness. No muscular tenderness.      Left lower leg: No edema.   Lymphadenopathy:      Cervical: No cervical adenopathy.   Skin:     General: Skin is warm.      Coloration: Skin is not jaundiced or pale.      Findings: No bruising or erythema.   Neurological:      Mental Status: She is alert and oriented to person, place, and time.      Gait: Gait normal.   Psychiatric:         Mood and Affect: Mood normal.         Behavior: Behavior normal.         Thought Content: Thought content normal.         Judgment: Judgment normal.           Assessment:      1. Non-allergic rhinitis    2. History of angioedema        Plan:       Non-allergic  rhinitis    History of angioedema  -     Ambulatory referral/consult to Allergy  -     EPINEPHrine (EPIPEN) 0.3 mg/0.3 mL AtIn; Inject 0.3 mLs (0.3 mg total) into the muscle once. If symptoms not improved repeat in about 5-15 minutes - inject a second dose (pen) of 0.3 mL into muscle once. for 1 dose  Dispense: 2 each; Refill: 2  -     CU (Chronic Urticaria) Index Panel; Future; Expected date: 11/25/2024  -     Tryptase; Future; Expected date: 11/25/2024  -     FUNC ANTI-FC R AUTOANTIBODY; Future; Expected date: 11/25/2024  -     Allergen Shrimp IgE; Future; Expected date: 11/25/2024  -     FACTOR 12 ASSAY; Future; Expected date: 11/25/2024  -     C1 ESTERASE INHIBITOR PROTEIN; Future; Expected date: 11/25/2024  -     C1 ESTERASE INHIBITOR, FUNCTIONAL; Future; Expected date: 11/25/2024  -     C1Q BINDING ASSAY; Future; Expected date: 11/25/2024  -     Protein Electrophoresis, Serum; Future; Expected date: 11/25/2024  -     Allergen Alpha-Gal IgE; Future; Expected date: 11/25/2024         Suspect additive in medrol dose pack caused hives. Offered patch testing. She Declined.    Labs ordered.  Discussed daily antihistamines and Xolair. Information given on AVS.    Recommend avoidance of shrimp and shrimp products and any food that has caused symptoms.  Epipen 2 pack- discussed use, care and administration    RTC 3-4 weeks   LEONARD HILL spent a total of 50 minutes on the day of the visit.This includes face to face time and non-face to face time preparing to see the patient (eg, review of tests), obtaining and/or reviewing separately obtained history, documenting clinical information in the electronic or other health record, independently interpreting results and communicating results to the patient/family/caregiver, or care coordinator.     CC: Dr. Morales

## 2024-11-26 LAB
ALBUMIN SERPL ELPH-MCNC: 4.31 G/DL (ref 3.35–5.55)
ALPHA1 GLOB SERPL ELPH-MCNC: 0.25 G/DL (ref 0.17–0.41)
ALPHA2 GLOB SERPL ELPH-MCNC: 0.82 G/DL (ref 0.43–0.99)
B-GLOBULIN SERPL ELPH-MCNC: 0.79 G/DL (ref 0.5–1.1)
FACT XII ACT/NOR PPP: 89 % (ref 30–130)
GAMMA GLOB SERPL ELPH-MCNC: 1.34 G/DL (ref 0.67–1.58)
PATHOLOGIST INTERPRETATION SPE: NORMAL
PROT SERPL-MCNC: 7.5 G/DL (ref 6–8.4)

## 2024-11-27 LAB
ALPHA-GAL IGE QN: <0.1 KU/L
TRYPTASE LEVEL: 5.3 NG/ML

## 2024-11-29 LAB
RAST CLASS: NORMAL
SHRIMP IGE QN: <0.1 KU/L

## 2024-12-03 LAB
C1INH FUNCTIONAL/C1INH TOTAL MFR SERPL: >100 %
C1INH SERPL-MCNC: 32 MG/DL (ref 21–39)
CU INDEX: 4.3
CU, ANTI-THYROGLOBULIN IGG: <20 IU/ML
CU, ANTI-THYROID PEROXIDASE IGG: <10 IU/ML
CU, TSH (THYROTROPIN): 2.09 UIU/ML (ref 0.4–4)

## 2024-12-06 LAB — IC SERPL C1Q BIND-ACNC: 4.9 UG EQ/ML (ref 0–3.9)

## 2024-12-10 ENCOUNTER — OFFICE VISIT (OUTPATIENT)
Dept: ALLERGY | Facility: CLINIC | Age: 49
End: 2024-12-10
Payer: COMMERCIAL

## 2024-12-10 ENCOUNTER — TELEPHONE (OUTPATIENT)
Dept: ALLERGY | Facility: CLINIC | Age: 49
End: 2024-12-10
Payer: COMMERCIAL

## 2024-12-10 DIAGNOSIS — Z88.0 PENICILLIN ALLERGY: ICD-10-CM

## 2024-12-10 DIAGNOSIS — M35.00 SJOGREN'S SYNDROME, WITH UNSPECIFIED ORGAN INVOLVEMENT: Primary | ICD-10-CM

## 2024-12-10 DIAGNOSIS — M35.00 SJOGREN'S SYNDROME, WITH UNSPECIFIED ORGAN INVOLVEMENT: ICD-10-CM

## 2024-12-10 DIAGNOSIS — R22.0 FACIAL SWELLING: Primary | ICD-10-CM

## 2024-12-10 DIAGNOSIS — Z91.013 SHRIMP ALLERGY: ICD-10-CM

## 2024-12-10 DIAGNOSIS — L50.9 URTICARIA: ICD-10-CM

## 2024-12-10 DIAGNOSIS — Z00.6 EXAM FOR CLINICAL RESEARCH: ICD-10-CM

## 2024-12-10 DIAGNOSIS — Z00.6 RESEARCH SUBJECT: ICD-10-CM

## 2024-12-10 PROCEDURE — 99214 OFFICE O/P EST MOD 30 MIN: CPT | Mod: 95,,, | Performed by: ALLERGY & IMMUNOLOGY

## 2024-12-10 PROCEDURE — 3044F HG A1C LEVEL LT 7.0%: CPT | Mod: CPTII,95,, | Performed by: ALLERGY & IMMUNOLOGY

## 2024-12-10 PROCEDURE — G2211 COMPLEX E/M VISIT ADD ON: HCPCS | Mod: 95,,, | Performed by: ALLERGY & IMMUNOLOGY

## 2024-12-10 NOTE — TELEPHONE ENCOUNTER
----- Message from [place] sent at 12/10/2024 11:26 AM CST -----  Contact: [phone]  Type:  Same Day Appointment Request    Caller is requesting a same day appointment.  Caller declined first available appointment listed below.    Name of Caller:TIMOTHY ANNE [[id number]]  When is the first available appointment?RESCHEDULE TO VIRTUAL  Symptoms: 2 week follow up  Best Call Back Number:[phone]  Additional Information: MRN [id number]... patient is not feeling well

## 2024-12-10 NOTE — PROGRESS NOTES
Subjective:      Patient ID: Olesya Tay is a 49 y.o. female.      The patient location is:  [place]   The chief complaint leading to consultation is: Follow up    Visit type: audiovisual    Face to Face time with patient: 33 minutes of total time spent on the encounter, which includes face to face time and non-face to face time preparing to see the patient (eg, review of tests), Obtaining and/or reviewing separately obtained history, Documenting clinical information in the electronic or other health record, Independently interpreting results (not separately reported) and communicating results to the patient/family/caregiver, or Care coordination (not separately reported).         Each patient to whom he or she provides medical services by telemedicine is:  (1) informed of the relationship between the physician and patient and the respective role of any other health care provider with respect to management of the patient; and (2) notified that he or she may decline to receive medical services by telemedicine and may withdraw from such care at any time.    Notes:        Chief Complaint:  Follow-up      HPI: 49 year old female    Eating shrimp- intermittent symptoms  Can have nasal congestion with shrimp intermittently  Not required an Epipen    Not taking antihistamines  No rashes or swelling, since her previous visit    Penicillin allergy- rash      HPI:  11/25/2024:49 year old female referred by Dr. Morales  Seen by Lacey gillis NP in 2022 for shrimp allergy  Facial swelling    3 years ago- seafood gumbo, difficulty breathing- now eating shrimp without symptoms    24- 48 hours after eating shrimp- facial swelling- last time occurred with shrimp after eating shrimp recently- one month ago and she has eaten shrimp the day before. She reports that symptoms last a week. NO difficulty breathing.  No used epipen   She has one.      Eating crab - no swelling.  NO symptoms with oysters    3 years ago- [place]- [place]ysters and shrimp-swelling the next day      Sjogren's syndrome      Hives with medrol dose pack- 4- 5 years ago        Past Medical History:   Diagnosis Date    Anemia     Constipation     Depression     Endometriosis of pelvis     GERD (gastroesophageal reflux disease)     Gestational diabetes mellitus in pregnancy     Heart murmur     Lung nodule     Osteoporosis     Sjogren's syndrome         Family History   Problem Relation Name Age of Onset    Diabetes Mother Lali     Cataracts Mother Lali     Early death Mother Lali 52        hepatitis c    Arthritis Maternal Aunt Barbara         rheumatoid    Rheum arthritis Maternal Aunt Barbara     Osteoarthritis Maternal Aunt Barbara     Breast cancer Paternal Aunt Lisa Jj     Heart disease Maternal Grandfather      Macular degeneration Paternal Grandmother Hang     Cancer Paternal Grandmother Hang         stomach    Stroke Paternal Grandfather Don         Current Outpatient Medications on File Prior to Visit   Medication Sig Dispense Refill    diltiaZEM (CARDIZEM CD) 180 MG 24 hr capsule Take 1 capsule (180 mg total) by mouth once daily. 30 capsule 11    diphenhydrAMINE (BENADRYL) 25 mg capsule Take 25 mg by mouth every 6 (six) hours as needed for Itching.      EPINEPHrine (EPIPEN) 0.3 mg/0.3 mL AtIn Inject 0.3 mLs (0.3 mg total) into the muscle once. If symptoms not improved repeat in about 5-15 minutes - inject a second dose (pen) of 0.3 mL into muscle once. for 1 dose 2 each 2    esomeprazole (NEXIUM) 40 MG capsule TAKE 1 CAPSULE BY MOUTH ONCE DAILY BEFORE BREAKFAST. 90 capsule 3    estradioL (ESTRACE) 0.01 % (0.1 mg/gram) vaginal cream Place 1 g vaginally once daily. For 14 days then apply 1 gram twice weekly 42.5 g 6    hydroxychloroquine (PLAQUENIL) 200 mg tablet Take 1 tablet (200 mg total) by mouth once daily. 90 tablet 3    loratadine (CLARITIN) 10 mg tablet Take 10 mg by mouth once daily.      metFORMIN (GLUCOPHAGE-XR) 500 MG ER 24hr tablet Take 1  tablet (500 mg total) by mouth 2 (two) times daily with meals. 180 tablet 3    natrexone tablet 4.5 mg Take 1 tablet (4.5 mg total) by mouth every evening. 90 tablet 3    sertraline (ZOLOFT) 50 MG tablet Take 1 tablet (50 mg total) by mouth once daily. 90 tablet 1    tirzepatide, weight loss, (ZEPBOUND) 2.5 mg/0.5 mL Soln Inject 0.5 mLs (2.5 mg total) into the skin every 7 days. 3 mL 1    [DISCONTINUED] INV deucravactinib/placebo tablet Take 2 tablets by mouth in the morning and 2 tablets by mouth in the evening as directed approximately 12 hours apart. For Investigational Use only. Protocol -8616 Subject ID#  [id number] 128 tablet 0     No current facility-administered medications on file prior to visit.                Environmental History: not applicable  Review of Systems   Constitutional:  Negative for chills and fever.   HENT:  Positive for rhinorrhea. Negative for congestion.    Eyes:  Negative for discharge and itching.   Skin:  Positive for rash.   Allergic/Immunologic: Positive for environmental allergies and food allergies.   Neurological:  Negative for facial asymmetry and speech difficulty.   Psychiatric/Behavioral:  Negative for behavioral problems and suicidal ideas.        Objective:   Alert and oriented  In NAD, speaking in complete sentences      Assessment:      1. Facial swelling    2. Urticaria    3. Penicillin allergy    4. Shrimp allergy          Plan:       Facial swelling    Urticaria    Penicillin allergy    Shrimp allergy       NO facial swelling, since she was seen here previously.    Suspect additive in medrol dose pack caused hives.     Discussed labs  Will schedule patch test  Will schedule for Penicillin allergy testing      Recommend avoidance of shrimp and shrimp products and any food that has caused symptoms.  Epipen 2 pack- discussed use, care and administration    Visit today included increased complexity associated with the care of the episodic problem  Urticaria, Facial  swelling, and Penicillin Allergy addressed and managing the longitudinal care of the patient due to the serious and/or complex managed problem(s)  Urticaria, Facial swelling, and Penicillin Allergy.       RTC 3-4 weeks   LEONARD HILL spent a total of 31 minutes on the day of the visit.This includes face to face time and non-face to face time preparing to see the patient (eg, review of tests), obtaining and/or reviewing separately obtained history, documenting clinical information in the electronic or other health record, independently interpreting results and communicating results to the patient/family/caregiver, or care coordinator.

## 2024-12-11 ENCOUNTER — RESEARCH ENCOUNTER (OUTPATIENT)
Dept: RESEARCH | Facility: HOSPITAL | Age: 49
End: 2024-12-11
Payer: COMMERCIAL

## 2024-12-11 ENCOUNTER — OFFICE VISIT (OUTPATIENT)
Dept: OPHTHALMOLOGY | Facility: CLINIC | Age: 49
End: 2024-12-11
Payer: COMMERCIAL

## 2024-12-11 ENCOUNTER — TELEPHONE (OUTPATIENT)
Dept: INTERNAL MEDICINE | Facility: CLINIC | Age: 49
End: 2024-12-11
Payer: COMMERCIAL

## 2024-12-11 ENCOUNTER — HOSPITAL ENCOUNTER (OUTPATIENT)
Dept: CARDIOLOGY | Facility: HOSPITAL | Age: 49
Discharge: HOME | End: 2024-12-11
Payer: COMMERCIAL

## 2024-12-11 ENCOUNTER — OFFICE VISIT (OUTPATIENT)
Dept: RHEUMATOLOGY | Facility: CLINIC | Age: 49
End: 2024-12-11
Payer: COMMERCIAL

## 2024-12-11 VITALS
WEIGHT: 214.5 LBS | DIASTOLIC BLOOD PRESSURE: 82 MMHG | HEART RATE: 85 BPM | BODY MASS INDEX: 33.6 KG/M2 | SYSTOLIC BLOOD PRESSURE: 132 MMHG

## 2024-12-11 DIAGNOSIS — R76.8 RHEUMATOID FACTOR POSITIVE: ICD-10-CM

## 2024-12-11 DIAGNOSIS — Z00.6 EXAM FOR CLINICAL RESEARCH: ICD-10-CM

## 2024-12-11 DIAGNOSIS — M35.00 SJOGREN'S SYNDROME, WITH UNSPECIFIED ORGAN INVOLVEMENT: ICD-10-CM

## 2024-12-11 DIAGNOSIS — Z00.6 ENCOUNTER FOR EXAMINATION FOR NORMAL COMPARISON AND CONTROL IN CLINICAL RESEARCH PROGRAM: Primary | ICD-10-CM

## 2024-12-11 DIAGNOSIS — M35.01 SJOGREN'S SYNDROME WITH KERATOCONJUNCTIVITIS SICCA: Primary | ICD-10-CM

## 2024-12-11 DIAGNOSIS — M35.01 SJOGREN'S SYNDROME WITH KERATOCONJUNCTIVITIS SICCA: ICD-10-CM

## 2024-12-11 DIAGNOSIS — Z79.899 ENCOUNTER FOR LONG-TERM CURRENT USE OF HIGH RISK MEDICATION: ICD-10-CM

## 2024-12-11 LAB
OHS QRS DURATION: 76 MS
OHS QTC CALCULATION: 394 MS

## 2024-12-11 PROCEDURE — 93005 ELECTROCARDIOGRAM TRACING: CPT

## 2024-12-11 NOTE — TELEPHONE ENCOUNTER
----- Message from Tonja sent at 12/11/2024  8:44 AM CST -----  Contact: Gary [phone]  PT calling to speak with the nurse regarding medication and thyroid questions. Please call to advise.

## 2024-12-11 NOTE — PROGRESS NOTES
Visit: WEEK 24  Sponsor: [employer]   Title: -[id number]  IRB #: [id number]  : Neil Morales   Site: 0017  Others Present: N/A  Pt No: [id number]    Subject presents to the clinic for Week 24 visit. Subject reports cold symptoms which increased dry mouth feeling and tenderness.     IRT contacted and visit was registered.      Concomitant medications reviewed with the patient and updated to ensure accuracy. Refer to Concomitant Medication Log in subject binder.    Vitals:   BP: 132/82  Pulse: 85  Respiratory Rate: 17  Temp: 98.1  Weight: 97.3  Is Subject of Childbearing Potential: No . Patient was advised about the risk of pregnancy during the course of the study. Patient verbalized understanding of study visit procedures.     Assessments:  eCOA tablet compliance was reviewed, patient completed all required questionnaires.     Patient Assessment completed via TrialMax: Yes  Patient Sjogren's Syndrome Questionnaire completed: Yes, results uploaded into EDC and filed in subject file.  Physical assessment completed: Yes  Tender/Swollen Joint Count completed: Yes , results uploaded into EDC and filed in subject file.  Investigator Assessments completed: Yes, results uploaded into EDC and filed in subject file.    Labs:  Fasting Labs and urine were collected per protocol at 10:00 AM. All labs were processed and shipped per requisition. Requisition number: [id number]  [id number]  Biomarkers collected: Yes  RNA sample (whole blood) collected: Yes  Flow Cytometry collected: Yes  Cryoglobulin previously positive: No ; if positive, local cryoglobulin results:   PK Sample collected: Yes       PK Tube ID:[id number]         Collection Time: 10:00 AM             Additional Testing:  Salivary Flow Test performed: Yes                  Stimulated with paraffin: Yes Weight: 2.93 grams (Pre-sample collection vial: 2.27 grams)    Start Time: 9:52 AM  Stop Time: 9:57 AM:                  Unstimulated: Yes  Weight: 2.54 grams (Pre-sample collection vial: 2.27 grams)    Start Time: 9:46 AM   Stop Time: 9:51 AM  meera Rice performed: Yes, refer to ophthalmologist note   Tear Breakup Evaluation performed : Yes, refer to ophthalmologist note.     Urine Pregnancy Test required:  NO    IP Administration/Accountability:  Subject was educated on medication administration and handling. Informed subject to return any/all unused and empty bottles at each on-site visit. Verbalized understanding. Drug accountability completed by myself and research pharmacist. IP returned: [id number] returned- 2 tablets [id number] returned- 16 tablets   Subject reports missing 1 morning dose that occurred on 25NOV2024     IP dispensed: [id number], [id number]    On-site dose administered  at 10:15 AM. Patient tolerated well.     Adverse Events Reported: No    End of Visit:  Patient has the study-related emergency contacts. Subject advised to contact research staff immediately for any adverse effects and if adverse effects occur after hours to report immediately to the ER for treatment. Verbalized understanding.      Week 28 visit was scheduled with the subject.     Visit registered in [employer].   [employer] payment? Yes

## 2024-12-11 NOTE — PROGRESS NOTES
RHEUMATOLOGY CLINIC ESTABLISHED PATIENT VISIT    Sponsor: [employer]   Title: -[id number]  IRB #: [id number]  : Neil Morales   Site: 0017  Others Present: N/A  Is LAR consenting for subject: No     Pt No: [id number]    Reason for consult:- Sjogren's syndrome; research subject    Chief complaints, HPI, ROS, EXAM, Assessment & Plans:-    Olesya Tay is a 49 y.o. pleasant female who presents to follow up for research visit today.  Currently in clinical trial for Sjogren's syndrome.  Denies any significant joint pain today . No joint swelling or prolonged morning stiffness.  Recovering from a mild URI.  No significant change in fatigue.   History of angioedema.  No recent angioedema.  Rheumatologic review of systems otherwise negative.      Physical Exam    Date completed: 12/11/2024    Physician: Neil Morales MD    Head: normal  Eyes: normal  Ears: normal  Nose: normal  Lungs: normal  Heart: normal  Musculoskeletal: normal  Neurological: normal  Skin: normal  Lymph nodes: normal- Tender 1*1 cm left submandibular lymph node.  Mouth: normal  Gastrointestinal: normal    Reviewed all available old and outside pertinent medical records.    All lab results personally reviewed and interpreted by me.    1. Encounter for examination for normal comparison and control in clinical research program    2. Sjogren's syndrome with keratoconjunctivitis sicca          Problem List Items Addressed This Visit       Encounter for examination for normal comparison and control in clinical research program - Primary    Sjogren's syndrome with keratoconjunctivitis sicca         Patient following up today for management of Sjogren's disease  Positive SEAMUS, positive SSA, positive SSB, positive rheumatoid factor with dryness  Currently on hydroxychloroquine 200 mg daily and research medication.  Significant improvement of arthritis on research medication.  No significant change in fatigue.   Continuing low-dose naltrexone .  Continue hydroxychloroquine 200 mg daily.   Continue low-dose naltrexone  Research visit today for 28 joint count  Continue study drug        Past Medical History:   Diagnosis Date    Anemia     Constipation     Depression     Endometriosis of pelvis     GERD (gastroesophageal reflux disease)     Gestational diabetes mellitus in pregnancy     Heart murmur     Lung nodule     Osteoporosis     Sjogren's syndrome        Past Surgical History:   Procedure Laterality Date    COSMETIC SURGERY          Social History     Tobacco Use    Smoking status: Never     Passive exposure: Never    Smokeless tobacco: Never   Substance Use Topics    Alcohol use: Yes     Alcohol/week: 6.0 standard drinks of alcohol     Types: 6 Glasses of wine per week     Comment: socially    Drug use: Never       Family History   Problem Relation Name Age of Onset    Diabetes Mother Lali     Cataracts Mother Lali     Early death Mother Lali 52        hepatitis c    Arthritis Maternal Aunt Barbara         rheumatoid    Rheum arthritis Maternal Aunt Barbara     Osteoarthritis Maternal Aunt Barbara     Breast cancer Paternal Aunt Lisa Jj     Heart disease Maternal Grandfather      Macular degeneration Paternal Grandmother Hang     Cancer Paternal Grandmother Hang         stomach    Stroke Paternal Grandfather Don        Review of patient's allergies indicates:   Allergen Reactions    Doxycycline Shortness Of Breath     Elevated BP and shortness of breath    Shrimp Swelling    Prednisone Hives     Hives after taking medrol dose pack    Pcn [penicillins] Rash       Medication List with Changes/Refills   Current Medications    DILTIAZEM (CARDIZEM CD) 180 MG 24 HR CAPSULE    Take 1 capsule (180 mg total) by mouth once daily.    DIPHENHYDRAMINE (BENADRYL) 25 MG CAPSULE    Take 25 mg by mouth every 6 (six) hours as needed for Itching.    EPINEPHRINE (EPIPEN) 0.3 MG/0.3 ML ATIN    Inject 0.3 mLs (0.3 mg total) into the muscle  once. If symptoms not improved repeat in about 5-15 minutes - inject a second dose (pen) of 0.3 mL into muscle once. for 1 dose    ESOMEPRAZOLE (NEXIUM) 40 MG CAPSULE    TAKE 1 CAPSULE BY MOUTH ONCE DAILY BEFORE BREAKFAST.    ESTRADIOL (ESTRACE) 0.01 % (0.1 MG/GRAM) VAGINAL CREAM    Place 1 g vaginally once daily. For 14 days then apply 1 gram twice weekly    HYDROXYCHLOROQUINE (PLAQUENIL) 200 MG TABLET    Take 1 tablet (200 mg total) by mouth once daily.    INV DEUCRAVACTINIB/PLACEBO TABLET    Take 2 tablets by mouth in the morning and 2 tablets by mouth in the evening as directed approximately 12 hours apart. For Investigational Use only. Protocol -7823 Subject ID#  [id number]    LORATADINE (CLARITIN) 10 MG TABLET    Take 10 mg by mouth once daily.    METFORMIN (GLUCOPHAGE-XR) 500 MG ER 24HR TABLET    Take 1 tablet (500 mg total) by mouth 2 (two) times daily with meals.    NATREXONE TABLET 4.5 MG    Take 1 tablet (4.5 mg total) by mouth every evening.    SERTRALINE (ZOLOFT) 50 MG TABLET    Take 1 tablet (50 mg total) by mouth once daily.    TIRZEPATIDE, WEIGHT LOSS, (ZEPBOUND) 2.5 MG/0.5 ML SOLN    Inject 0.5 mLs (2.5 mg total) into the skin every 7 days.         Disclaimer: This note was prepared using voice recognition system and is likely to have sound alike errors and is not proofread.  Please message me with any questions.

## 2024-12-11 NOTE — PROGRESS NOTES
HPI     Dry Eye            Comments: Dry eye study pt  Pt co OU being itchy, has cold .   No watering OU  Visine prn OU           Comments    1. Sjogren's syndrome  2. KCS OU  3. Rosacea  4. Long term Plaquenil use 2014 (approx)  5. Borderline diabetes  6. SCTL wearer (monova)             Last edited by Hi Sanz on 12/11/2024  2:37 PM.            Assessment /Plan     For exam results, see Encounter Report.      ICD-10-CM ICD-9-CM    1. Sjogren's syndrome with keratoconjunctivitis sicca  M35.01 710.2 **PT IS CURRENTLY ON DECONGESTANTS      Protocol: [facility] -5032  Ophthalmology : Juan Diego Sahni MD     Appendix 4 - Ocular Assessments Worksheet     Schirmer's Test     Start and stop time should be recorded and based on a 5-minute period for testing.      Sample Collection Start Time: 15:22 pm  Sample Collection Stop Time: 15:27 pm     Measurements (mm) Right Eye Left Eye   Leading edge (mm) 5 5   Lagging edge (mm) 5 5   Median value 5 5   Was moisture strip saturated (>35 mm)? Yes/No No  No       Tear Break-up Test              Right Eye Left Eye   Test #1 (seconds) 3 7   Test 2 (seconds)  4 10   Test #3 (seconds) 6 12   Result of Ophthalmological Examination (seconds; TBUT is the average of 3 scores) 4.3 3   Is debris present? Yes/No Yes No          Van Bijsmandi Score:       Right  Eye Left Eye   Medial/Nasal Conjunctiva Grade (0 to 3) 2 2   Lateral/Temporal Conjunctiva Grade (0 to 3) 1 0   Cornea Grade (0 to 3) 1 0   Total score (0 to 9) 4 2                   2. Rheumatoid factor positive  R76.8 795.79       3. Encounter for long-term current use of high risk medication  Z79.899 V58.69

## 2024-12-11 NOTE — TELEPHONE ENCOUNTER
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 The patient stated that she was informed by the school to follow up with her primary care doctor.The patient was scheduled an appointment on 12/17/24 at 11:00 am.

## 2024-12-12 ENCOUNTER — OFFICE VISIT (OUTPATIENT)
Dept: SLEEP MEDICINE | Facility: CLINIC | Age: 49
End: 2024-12-12
Attending: INTERNAL MEDICINE
Payer: COMMERCIAL

## 2024-12-12 ENCOUNTER — OFFICE VISIT (OUTPATIENT)
Dept: OPHTHALMOLOGY | Facility: CLINIC | Age: 49
End: 2024-12-12
Payer: COMMERCIAL

## 2024-12-12 VITALS
OXYGEN SATURATION: 95 % | RESPIRATION RATE: 18 BRPM | BODY MASS INDEX: 34.01 KG/M2 | HEIGHT: 67 IN | DIASTOLIC BLOOD PRESSURE: 82 MMHG | SYSTOLIC BLOOD PRESSURE: 132 MMHG | WEIGHT: 216.69 LBS | HEART RATE: 85 BPM

## 2024-12-12 DIAGNOSIS — Z79.899 LONG-TERM USE OF PLAQUENIL: ICD-10-CM

## 2024-12-12 DIAGNOSIS — G47.00 INSOMNIA, UNSPECIFIED TYPE: ICD-10-CM

## 2024-12-12 DIAGNOSIS — E66.811 OBESITY (BMI 30.0-34.9): ICD-10-CM

## 2024-12-12 DIAGNOSIS — M35.01 SJOGREN'S SYNDROME WITH KERATOCONJUNCTIVITIS SICCA: ICD-10-CM

## 2024-12-12 DIAGNOSIS — G47.33 OSA ON CPAP: Primary | ICD-10-CM

## 2024-12-12 DIAGNOSIS — Z79.899 ENCOUNTER FOR LONG-TERM CURRENT USE OF HIGH RISK MEDICATION: Primary | ICD-10-CM

## 2024-12-12 DIAGNOSIS — R91.8 MULTIPLE PULMONARY NODULES: ICD-10-CM

## 2024-12-12 PROCEDURE — 99999 PR PBB SHADOW E&M-EST. PATIENT-LVL III: CPT | Mod: PBBFAC,,, | Performed by: STUDENT IN AN ORGANIZED HEALTH CARE EDUCATION/TRAINING PROGRAM

## 2024-12-12 PROCEDURE — 99999 PR PBB SHADOW E&M-EST. PATIENT-LVL V: CPT | Mod: PBBFAC,,, | Performed by: INTERNAL MEDICINE

## 2024-12-12 PROCEDURE — 3079F DIAST BP 80-89 MM HG: CPT | Mod: CPTII,S$GLB,, | Performed by: INTERNAL MEDICINE

## 2024-12-12 PROCEDURE — 3044F HG A1C LEVEL LT 7.0%: CPT | Mod: CPTII,S$GLB,, | Performed by: INTERNAL MEDICINE

## 2024-12-12 PROCEDURE — 3075F SYST BP GE 130 - 139MM HG: CPT | Mod: CPTII,S$GLB,, | Performed by: INTERNAL MEDICINE

## 2024-12-12 PROCEDURE — 1159F MED LIST DOCD IN RCRD: CPT | Mod: CPTII,S$GLB,, | Performed by: INTERNAL MEDICINE

## 2024-12-12 PROCEDURE — 1160F RVW MEDS BY RX/DR IN RCRD: CPT | Mod: CPTII,S$GLB,, | Performed by: INTERNAL MEDICINE

## 2024-12-12 PROCEDURE — 3008F BODY MASS INDEX DOCD: CPT | Mod: CPTII,S$GLB,, | Performed by: INTERNAL MEDICINE

## 2024-12-12 PROCEDURE — 99214 OFFICE O/P EST MOD 30 MIN: CPT | Mod: S$GLB,,, | Performed by: INTERNAL MEDICINE

## 2024-12-12 NOTE — PROGRESS NOTES
HPI     Diabetic Eye Exam     Additional comments: Pt here for an annual diabetic exam with MOCT and to   review the VF 10-2 that was done yesterday for high risk med use. [facility]   stable with CLS in OS only to aid in near vision.     Lab Results       Component                Value               Date                       HGBA1C                   5.1                 11/13/2024                       Comments    1. Sjogren's syndrome  2. KCS OU  3. Rosacea  4. Long term Plaquenil use 2014 (approx)  5. Borderline diabetes  6. SCTL wearer (monova)             Last edited by Michelle Martinez on 12/12/2024  2:09 PM.            Assessment /Plan     For exam results, see Encounter Report.    Encounter for long-term current use of high risk medication  Long-term use of Plaquenil  Sjogren's syndrome with keratoconjunctivitis sicca  -   No evidence of maculopathy on DFE. HVF 10-2 and MOCT both WNL  Follow with annual testing        RTC 1 year DFE, HVF 10-2. MOCT

## 2024-12-12 NOTE — PROGRESS NOTES
Pulmonary Outpatient  Visit     Subjective:       Patient ID: Olesya Tay is a 49 y.o. female.    Chief Complaint: Sleep Apnea        Olesya Tay is 47 y.o.  Last seen 03/29/2022  SIENA on CPAP 5-15  Usage > 4 hrs dropped   Used only 3 days  Last visit was using daily  Pressure and discomfort issues  Asked about MAD  Will change to fixed CPAP  Last ct chest nodule 5 mm   Needed f/u: missed f/u during COVID  Normal PFT in past      03/10/2023  Last seen  Follow to review imaging  Chest CT 12/2022; 3 mm and 5 mm nodule  Imaging since 2013, 2015, 2017  On Plaquenil;  Chronic fatigue and poor memory  CPAP adherence was poor  Stress test reviewed: During stress, the following significant arrhythmias were observed: occasional PVCs  Poor memory  Feels weight loss may negate need for CPAP  Offered repeat test  Wants to try CPAP again     12/15/2023  Followup  Lost weight  We have been followuing 2 nodule 3 mm   STABLE  No follup required  No interval health changes  Poor adherence with CPAP  Usage > 4 hrs was 17%  Asked about INSPIRE  Refereal  New supplies    12/12/2024  Followup  CPAP 7 cm  Residual AHI 2.7  Usage daily has been spotty   Trying to work on this   No daytime sleepiness   Attests feeling better when using the CPAP   Has outstanding chest CT scan for nodules that were noted on her last CT scan in 2023     The following portions of the patient's history were reviewed and updated as appropriate:   She  has a past medical history of Constipation; Depression; Endometriosis of pelvis; GERD (gastroesophageal reflux disease); Gestational diabetes mellitus in pregnancy; Lung nodule; and Sjogren's syndrome.  She  does not have any pertinent problems on file.  She  has no past surgical history on file.  Her family history includes Arthritis in her maternal aunt; Cancer in her paternal grandmother; Early death (age of onset: 52) in her mother; Heart disease in her  maternal grandfather.  She  reports that she has never smoked. She has never used smokeless tobacco. She reports that she drinks alcohol. She reports that she does not use illicit drugs.  She has a current medication list which includes the following prescription(s): esomeprazole magnesium, fish oil-omega-3 fatty acids, hydroxychloroquine, and vitamin e.        The following portions of the patient's history were reviewed and updated as appropriate: She  has a past medical history of Anemia, Constipation, Depression, Endometriosis of pelvis, GERD (gastroesophageal reflux disease), Gestational diabetes mellitus in pregnancy, Heart murmur, Lung nodule, Osteoporosis, and Sjogren's syndrome.  She does not have any pertinent problems on file.  She  has a past surgical history that includes Cosmetic surgery.  Her family history includes Arthritis in her maternal aunt; Breast cancer in her paternal aunt; Cancer in her paternal grandmother; Cataracts in her mother; Diabetes in her mother; Early death (age of onset: 52) in her mother; Heart disease in her maternal grandfather; Macular degeneration in her paternal grandmother; Osteoarthritis in her maternal aunt; Rheum arthritis in her maternal aunt; Stroke in her paternal grandfather.  She  reports that she has never smoked. She has never been exposed to tobacco smoke. She has never used smokeless tobacco. She reports current alcohol use of about 6.0 standard drinks of alcohol per week. She reports that she does not use drugs.  She has a current medication list which includes the following prescription(s): diltiazem, diphenhydramine, esomeprazole, estradiol, hydroxychloroquine, inv deucravactinib/placebo, loratadine, naltrexone hcl, sertraline, zepbound, epinephrine, and metformin.  Current Outpatient Medications on File Prior to Visit   Medication Sig Dispense Refill    diltiaZEM (CARDIZEM CD) 180 MG 24 hr capsule Take 1 capsule (180 mg total) by mouth once daily. 30  capsule 11    diphenhydrAMINE (BENADRYL) 25 mg capsule Take 25 mg by mouth every 6 (six) hours as needed for Itching.      esomeprazole (NEXIUM) 40 MG capsule TAKE 1 CAPSULE BY MOUTH ONCE DAILY BEFORE BREAKFAST. 90 capsule 3    estradioL (ESTRACE) 0.01 % (0.1 mg/gram) vaginal cream Place 1 g vaginally once daily. For 14 days then apply 1 gram twice weekly 42.5 g 6    hydroxychloroquine (PLAQUENIL) 200 mg tablet Take 1 tablet (200 mg total) by mouth once daily. 90 tablet 3    INV deucravactinib/placebo tablet Take 2 tablets by mouth in the morning and 2 tablets by mouth in the evening as directed approximately 12 hours apart. For Investigational Use only. Protocol -4490 Subject ID#  [id number] 128 tablet 0    loratadine (CLARITIN) 10 mg tablet Take 10 mg by mouth once daily.      natrexone tablet 4.5 mg Take 1 tablet (4.5 mg total) by mouth every evening. 90 tablet 3    sertraline (ZOLOFT) 50 MG tablet Take 1 tablet (50 mg total) by mouth once daily. 90 tablet 1    tirzepatide, weight loss, (ZEPBOUND) 2.5 mg/0.5 mL Soln Inject 0.5 mLs (2.5 mg total) into the skin every 7 days. 3 mL 1    EPINEPHrine (EPIPEN) 0.3 mg/0.3 mL AtIn Inject 0.3 mLs (0.3 mg total) into the muscle once. If symptoms not improved repeat in about 5-15 minutes - inject a second dose (pen) of 0.3 mL into muscle once. for 1 dose 2 each 2    metFORMIN (GLUCOPHAGE-XR) 500 MG ER 24hr tablet Take 1 tablet (500 mg total) by mouth 2 (two) times daily with meals. 180 tablet 3     No current facility-administered medications on file prior to visit.     She is allergic to doxycycline, shrimp, prednisone, and pcn [penicillins]..     Review of Systems   Respiratory: Negative.     All other systems reviewed and are negative.      Outpatient Encounter Medications as of 12/12/2024   Medication Sig Dispense Refill    diltiaZEM (CARDIZEM CD) 180 MG 24 hr capsule Take 1 capsule (180 mg total) by mouth once daily. 30 capsule 11    diphenhydrAMINE (BENADRYL) 25 mg  capsule Take 25 mg by mouth every 6 (six) hours as needed for Itching.      esomeprazole (NEXIUM) 40 MG capsule TAKE 1 CAPSULE BY MOUTH ONCE DAILY BEFORE BREAKFAST. 90 capsule 3    estradioL (ESTRACE) 0.01 % (0.1 mg/gram) vaginal cream Place 1 g vaginally once daily. For 14 days then apply 1 gram twice weekly 42.5 g 6    hydroxychloroquine (PLAQUENIL) 200 mg tablet Take 1 tablet (200 mg total) by mouth once daily. 90 tablet 3    INV deucravactinib/placebo tablet Take 2 tablets by mouth in the morning and 2 tablets by mouth in the evening as directed approximately 12 hours apart. For Investigational Use only. Protocol -1172 Subject ID#  [id number] 128 tablet 0    loratadine (CLARITIN) 10 mg tablet Take 10 mg by mouth once daily.      natrexone tablet 4.5 mg Take 1 tablet (4.5 mg total) by mouth every evening. 90 tablet 3    sertraline (ZOLOFT) 50 MG tablet Take 1 tablet (50 mg total) by mouth once daily. 90 tablet 1    tirzepatide, weight loss, (ZEPBOUND) 2.5 mg/0.5 mL Soln Inject 0.5 mLs (2.5 mg total) into the skin every 7 days. 3 mL 1    EPINEPHrine (EPIPEN) 0.3 mg/0.3 mL AtIn Inject 0.3 mLs (0.3 mg total) into the muscle once. If symptoms not improved repeat in about 5-15 minutes - inject a second dose (pen) of 0.3 mL into muscle once. for 1 dose 2 each 2    metFORMIN (GLUCOPHAGE-XR) 500 MG ER 24hr tablet Take 1 tablet (500 mg total) by mouth 2 (two) times daily with meals. 180 tablet 3    [DISCONTINUED] EPINEPHrine (EPIPEN) 0.3 mg/0.3 mL AtIn Inject 0.3 mLs (0.3 mg total) into the muscle once. If symptoms not improved repeat in about 5-15 minutes - inject a second dose (pen) of 0.3 mL into muscle once. for 1 dose 2 each 2    [DISCONTINUED] esomeprazole (NEXIUM) 40 MG capsule Take 1 capsule (40 mg total) by mouth before breakfast. 90 capsule 1    [DISCONTINUED] INV deucravactinib/placebo tablet Take 2 tablets by mouth in the morning and 2 tablets by mouth in the evening as directed approximately 12 hours  "apart. For Investigational Use only. Protocol -8274 Subject ID#  [id number] 128 tablet 0     No facility-administered encounter medications on file as of 12/12/2024.       Objective:     Vital Signs (Most Recent)  Vital Signs  Pulse: 85  Resp: 18  SpO2: 95 %  BP: 132/82  Pain Score: 0-No pain  Height and Weight  Height: 5' 7" (170.2 cm)  Weight: 98.3 kg (216 lb 11.4 oz)  BSA (Calculated - sq m): 2.16 sq meters  BMI (Calculated): 33.9  Weight in (lb) to have BMI = 25: 159.3]  Wt Readings from Last 2 Encounters:   12/12/24 98.3 kg (216 lb 11.4 oz)   12/11/24 97.3 kg (214 lb 8.1 oz)       Physical Exam   Constitutional: She is oriented to person, place, and time. She appears well-developed and well-nourished.   HENT:   Head: Normocephalic.   Mouth/Throat: Mallampati Score: II.   Neck: No JVD present.   Cardiovascular: Normal rate and intact distal pulses.   No murmur heard.  Pulmonary/Chest: Normal expansion, effort normal and breath sounds normal.   Abdominal: Soft. Bowel sounds are normal.   Musculoskeletal:         General: No edema. Normal range of motion.      Cervical back: Normal range of motion and neck supple.   Lymphadenopathy:     She has no axillary adenopathy.   Neurological: She is alert and oriented to person, place, and time.   Skin: Skin is warm and dry.   Psychiatric: She has a normal mood and affect.   Nursing note and vitals reviewed.      Laboratory  Lab Results   Component Value Date    WBC 4.51 11/13/2024    RBC 4.55 11/13/2024    HGB 13.8 11/13/2024    HCT 41.5 11/13/2024    MCV 91 11/13/2024    MCH 30.3 11/13/2024    MCHC 33.3 11/13/2024    RDW 13.2 11/13/2024     11/13/2024    MPV 8.9 (L) 11/13/2024    GRAN 2.9 11/13/2024    GRAN 64.9 11/13/2024    LYMPH 1.0 11/13/2024    LYMPH 22.6 11/13/2024    MONO 0.4 11/13/2024    MONO 8.6 11/13/2024    EOS 0.2 11/13/2024    BASO 0.02 11/13/2024    EOSINOPHIL 3.3 11/13/2024    BASOPHIL 0.4 11/13/2024       BMP  Lab Results   Component Value " "Date     2024     2024    K 3.8 2024    K 3.8 2024     2024     2024    CO2 25 2024    CO2 25 2024    BUN 11 2024    BUN 11 2024    CREATININE 0.8 2024    CREATININE 0.8 2024    CALCIUM 9.8 2024    CALCIUM 9.8 2024    ANIONGAP 12 2024    ANIONGAP 12 2024    ESTGFRAFRICA >60 2022    EGFRNONAA >60 2022    AST 18 2024    AST 18 2024    ALT 16 2024    ALT 16 2024    PROT 7.8 2024    PROT 7.8 2024       Lab Results   Component Value Date    BNP 49 2015       Lab Results   Component Value Date    TSH 2.681 2024       Lab Results   Component Value Date    SEDRATE 20 2023       Lab Results   Component Value Date    CRP 5.1 2023     Lab Results   Component Value Date    IGE <35 10/25/2022    IGE <35 2021        Lab Results   Component Value Date    ASPERGILLUS <0.10 10/25/2022     Lab Results   Component Value Date    AFUMIGATUSCL CLASS 0 10/25/2022        No results found for: "ACE"     Diagnostic Results:  I have personally reviewed today the following studies:    Lisa Visual Field - OU - Extended - Both Eyes  Right Eye  Reliability was good. Findings include normal observations.     Left Eye  Reliability was good. Findings include normal observations.     Notes  No maculopathy present OU     Olesya Tay  2024 - 12/10/2024  Patient ID: [id number]  : 1975  Age: 49 years  [facility] [place]  [address] [facility], [address]  Compliance Report  Compliance  Payor Standard  Usage 2024 - 12/10/2024  Usage days 16/30 days (53%)  >= 4 hours 6 days (20%)  < 4 hours 10 days (33%)  Usage hours 63 hours 17 minutes  Average usage (total days) 2 hours 7 minutes  Average usage (days used) 3 hours 57 minutes  Median usage (days used) 3 hours 40 minutes  Total used hours (value since last reset - " 12/10/2024) 870 hours  AirSense 11 AutoSet  Serial number [id number]  Mode CPAP  Set pressure 7 cmH2O  EPR Fulltime  EPR level 3  Therapy  Leaks - L/min Median: 6.2 95th percentile: 14.5 Maximum: 16.5  Events per hour AI: 2.4 HI: 0.3 AHI: 2.7  Apnea Index Central: 0.3 Obstructive: 2.0 Unknown: 0.0  RERA Index 0.1  Cheyne-Max respiration (average duration per night) 0 minutes (0%)  Usage - hours            Assessment/Plan:     Problem List Items Addressed This Visit       Obesity (BMI 30.0-34.9)    Insomnia    Multiple pulmonary nodules     Imaging reviewed    3mm and 5 mm nodule    STABLE  LOW RISK            SIENA on CPAP - Primary     Data 11/11/2024 to 12/10/2024  Missed 14 days  CPAP 7 cm  AHI 2.7  Usage > 4 hrs was 2 %              Relevant Orders    CPAP/BIPAP SUPPLIES      Complete chest CT  Needs to improve adherence    Follow up in about 1 year (around 12/12/2025), or chest CT next week, PAP supplies, weight loss.    This note was prepared using voice recognition system and is likely to have sound alike errors that may have been overlooked even after proof reading.  Please call me with any questions    Discussed diagnosis, its evaluation, treatment and usual course. All questions answered.      Thom Velazquez MD

## 2024-12-17 ENCOUNTER — OFFICE VISIT (OUTPATIENT)
Dept: INTERNAL MEDICINE | Facility: CLINIC | Age: 49
End: 2024-12-17
Payer: COMMERCIAL

## 2024-12-17 VITALS
DIASTOLIC BLOOD PRESSURE: 82 MMHG | HEART RATE: 88 BPM | WEIGHT: 215.81 LBS | SYSTOLIC BLOOD PRESSURE: 126 MMHG | BODY MASS INDEX: 33.8 KG/M2 | OXYGEN SATURATION: 96 % | RESPIRATION RATE: 20 BRPM | TEMPERATURE: 98 F

## 2024-12-17 DIAGNOSIS — I10 PRIMARY HYPERTENSION: ICD-10-CM

## 2024-12-17 DIAGNOSIS — R53.82 CHRONIC FATIGUE: ICD-10-CM

## 2024-12-17 DIAGNOSIS — E01.0 THYROMEGALY: ICD-10-CM

## 2024-12-17 DIAGNOSIS — E66.811 OBESITY (BMI 30.0-34.9): ICD-10-CM

## 2024-12-17 DIAGNOSIS — R10.11 RIGHT UPPER QUADRANT PAIN: Primary | ICD-10-CM

## 2024-12-17 PROCEDURE — 99999 PR PBB SHADOW E&M-EST. PATIENT-LVL IV: CPT | Mod: PBBFAC,,, | Performed by: FAMILY MEDICINE

## 2024-12-17 PROCEDURE — 1159F MED LIST DOCD IN RCRD: CPT | Mod: CPTII,S$GLB,, | Performed by: FAMILY MEDICINE

## 2024-12-17 PROCEDURE — 99214 OFFICE O/P EST MOD 30 MIN: CPT | Mod: S$GLB,,, | Performed by: FAMILY MEDICINE

## 2024-12-17 PROCEDURE — G2211 COMPLEX E/M VISIT ADD ON: HCPCS | Mod: S$GLB,,, | Performed by: FAMILY MEDICINE

## 2024-12-17 PROCEDURE — 1160F RVW MEDS BY RX/DR IN RCRD: CPT | Mod: CPTII,S$GLB,, | Performed by: FAMILY MEDICINE

## 2024-12-17 PROCEDURE — 3074F SYST BP LT 130 MM HG: CPT | Mod: CPTII,S$GLB,, | Performed by: FAMILY MEDICINE

## 2024-12-17 PROCEDURE — 3079F DIAST BP 80-89 MM HG: CPT | Mod: CPTII,S$GLB,, | Performed by: FAMILY MEDICINE

## 2024-12-17 PROCEDURE — 3044F HG A1C LEVEL LT 7.0%: CPT | Mod: CPTII,S$GLB,, | Performed by: FAMILY MEDICINE

## 2024-12-17 PROCEDURE — 3008F BODY MASS INDEX DOCD: CPT | Mod: CPTII,S$GLB,, | Performed by: FAMILY MEDICINE

## 2024-12-17 RX ORDER — TIRZEPATIDE 2.5 MG/.5ML
2.5 INJECTION, SOLUTION SUBCUTANEOUS
Qty: 3 ML | Refills: 1 | Status: SHIPPED | OUTPATIENT
Start: 2024-12-17

## 2024-12-17 NOTE — PROGRESS NOTES
"Subjective:       Patient ID: Olesya Tay is a 49 y.o. female presents with   Patient Active Problem List   Diagnosis    Sjogren's syndrome with keratoconjunctivitis sicca    Rheumatoid factor positive    Multiple pulmonary nodules    Rosacea    Encounter for long-term current use of high risk medication    Xerostomia due to autoimmune disease    Chronic fatigue    Hair loss    Obesity (BMI 30.0-34.9)    SIENA on CPAP    Insomnia    Research subject    Long-term use of Plaquenil    Medial epicondylitis of both elbows    Encounter for examination for normal comparison and control in clinical research program        Chief Complaint: Follow-up (Concerned about "thryoid & possible gallstones" per research center. Upper (L) quadrant abdomen pain 4:10.)    History of Present Illness    Olesya presents today for evaluation of gallbladder pain. She reports intermittent sharp gallbladder pain, currently rating it 3-4/10, though it increased to 6/10 during ultrasound exam with applied pressure. 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 A 1 cm mass was found on the right side of the thyroid during ultrasound. Thyroid labs were normal. She reports experiencing night sweats without associated fevers and recurrent facial swelling.       ROS:  General: -fever, -chills, +fatigue, -weight gain, -weight loss, -loss of appetite, +night sweats  Eyes: -vision changes, -blurry vision, -eye pain, -eye discharge  ENT: -ear pain, -hearing loss, -tinnitus, -nasal congestion, -sore throat  Cardiovascular: -chest pain, -palpitations, -lower extremity edema  Respiratory: -cough, -shortness of breath, -wheezing, -sputum production  Endocrine: -polyuria, -polydipsia, -heat intolerance, -cold intolerance  Gastrointestinal: +abdominal pain, -heartburn, -nausea, -vomiting, -diarrhea, -constipation, -blood in stool  Genitourinary: -dysuria, -urgency, -frequency, -hematuria, -nocturia, -incontinence  Heme & Lymphatic: -easy or excessive " bleeding, -easy bruising, -swollen lymph nodes  Musculoskeletal: -muscle pain, -back pain, -joint pain, -joint swelling  Skin: -rash, -lesion, -itching, -skin texture changes, -skin color changes  Neurological: -headache, -dizziness, -numbness, -tingling, -seizure activity, -speech difficulty, -memory loss, -confusion  Psychiatric: -anxiety, -depression, -sleep difficulty                /82 (BP Location: Right arm, Patient Position: Sitting)   Pulse 88   Temp 98.1 °F (36.7 °C) (Tympanic)   Resp 20   Wt 97.9 kg (215 lb 13.3 oz)   LMP 12/06/2023   SpO2 96%   BMI 33.80 kg/m²   Objective:      Physical Exam  Constitutional:       Appearance: She is well-developed.   HENT:      Head: Normocephalic and atraumatic.   Cardiovascular:      Rate and Rhythm: Normal rate and regular rhythm.      Heart sounds: Normal heart sounds. No murmur heard.  Pulmonary:      Effort: Pulmonary effort is normal.      Breath sounds: Normal breath sounds. No wheezing.   Abdominal:      General: Bowel sounds are normal.      Palpations: Abdomen is soft.      Tenderness: There is abdominal tenderness.      Comments: Minimal tenderness in right upper quadrant pain   Musculoskeletal:      Cervical back: Neck supple.   Skin:     General: Skin is warm and dry.      Findings: No rash.   Neurological:      Mental Status: She is alert and oriented to person, place, and time.   Psychiatric:         Mood and Affect: Mood normal.           Assessment/Plan:   1. Right upper quadrant pain  -     US Abdomen Complete; Future; Expected date: 12/17/2024  2. Chronic fatigue  Will plan to repeat ultrasound of the abdomen for further evaluation  Reviewed recent labs which looks stable including liver functions  Encouraged to eat protein rich diet and exercise regularly    3. Thyromegaly  -     US Thyroid; Future; Expected date: 12/17/2024  Normal neck exam  Will get thyroid ultrasound for further evaluation    4. Obesity (BMI 30.0-34.9)  -      tirzepatide, weight loss, (ZEPBOUND) 2.5 mg/0.5 mL Soln; Inject 0.5 mLs (2.5 mg total) into the skin every 7 days.  Dispense: 3 mL; Refill: 1  Patient was unable to get Zepbound and waiting on PA, has never tried this medication before  Will send the prescription again to the pharmacy     Visit today included increased complexity associated with the care of the episodic problem  addressed and managing the longitudinal care of the patient due to the serious and/or complex managed problem(s) .     This note was generated with the assistance of ambient listening technology. Verbal consent was obtained by the patient and accompanying visitor(s) for the recording of patient appointment to facilitate this note. I attest to having reviewed and edited the generated note for accuracy, though some syntax or spelling errors may persist. Please contact the author of this note for any clarification.

## 2024-12-18 RX ORDER — DILTIAZEM HYDROCHLORIDE 180 MG/1
180 CAPSULE, COATED, EXTENDED RELEASE ORAL
Qty: 90 CAPSULE | Refills: 4 | Status: SHIPPED | OUTPATIENT
Start: 2024-12-18

## 2024-12-19 ENCOUNTER — HOSPITAL ENCOUNTER (OUTPATIENT)
Dept: RADIOLOGY | Facility: HOSPITAL | Age: 49
Discharge: HOME | End: 2024-12-19
Attending: INTERNAL MEDICINE
Payer: COMMERCIAL

## 2024-12-19 ENCOUNTER — PATIENT MESSAGE (OUTPATIENT)
Dept: ALLERGY | Facility: CLINIC | Age: 49
End: 2024-12-19
Payer: COMMERCIAL

## 2024-12-19 DIAGNOSIS — R91.8 MULTIPLE PULMONARY NODULES: ICD-10-CM

## 2024-12-19 PROCEDURE — 71250 CT THORAX DX C-: CPT | Mod: 26,,, | Performed by: RADIOLOGY

## 2024-12-19 PROCEDURE — 71250 CT THORAX DX C-: CPT | Mod: TC,PO

## 2024-12-23 ENCOUNTER — HOSPITAL ENCOUNTER (OUTPATIENT)
Dept: RADIOLOGY | Facility: HOSPITAL | Age: 49
Discharge: HOME | End: 2024-12-23
Attending: FAMILY MEDICINE
Payer: COMMERCIAL

## 2024-12-23 DIAGNOSIS — E01.0 THYROMEGALY: ICD-10-CM

## 2024-12-23 DIAGNOSIS — R10.11 RIGHT UPPER QUADRANT PAIN: ICD-10-CM

## 2024-12-23 PROCEDURE — 76536 US EXAM OF HEAD AND NECK: CPT | Mod: 26,,, | Performed by: RADIOLOGY

## 2024-12-23 PROCEDURE — 76536 US EXAM OF HEAD AND NECK: CPT | Mod: TC

## 2024-12-25 DIAGNOSIS — E04.2 MULTIPLE THYROID NODULES: Primary | ICD-10-CM

## 2024-12-26 ENCOUNTER — HOSPITAL ENCOUNTER (OUTPATIENT)
Dept: RADIOLOGY | Facility: HOSPITAL | Age: 49
Discharge: HOME | End: 2024-12-26
Attending: FAMILY MEDICINE
Payer: COMMERCIAL

## 2024-12-26 PROCEDURE — 76700 US EXAM ABDOM COMPLETE: CPT | Mod: TC,PO

## 2024-12-26 PROCEDURE — 76700 US EXAM ABDOM COMPLETE: CPT | Mod: 26,,, | Performed by: RADIOLOGY

## 2025-01-07 DIAGNOSIS — M35.00 SJOGREN'S SYNDROME, WITH UNSPECIFIED ORGAN INVOLVEMENT: Primary | ICD-10-CM

## 2025-01-07 DIAGNOSIS — Z00.6 RESEARCH SUBJECT: ICD-10-CM

## 2025-01-08 ENCOUNTER — LAB VISIT (OUTPATIENT)
Dept: LAB | Facility: HOSPITAL | Age: 50
End: 2025-01-08
Attending: INTERNAL MEDICINE
Payer: COMMERCIAL

## 2025-01-08 ENCOUNTER — RESEARCH ENCOUNTER (OUTPATIENT)
Dept: RESEARCH | Facility: HOSPITAL | Age: 50
End: 2025-01-08
Payer: COMMERCIAL

## 2025-01-08 ENCOUNTER — OFFICE VISIT (OUTPATIENT)
Dept: RHEUMATOLOGY | Facility: CLINIC | Age: 50
End: 2025-01-08
Payer: COMMERCIAL

## 2025-01-08 VITALS
SYSTOLIC BLOOD PRESSURE: 137 MMHG | DIASTOLIC BLOOD PRESSURE: 99 MMHG | HEART RATE: 82 BPM | WEIGHT: 218.06 LBS | BODY MASS INDEX: 34.15 KG/M2

## 2025-01-08 DIAGNOSIS — Z00.6 ENCOUNTER FOR EXAMINATION FOR NORMAL COMPARISON AND CONTROL IN CLINICAL RESEARCH PROGRAM: ICD-10-CM

## 2025-01-08 DIAGNOSIS — M35.00 SJOGREN'S SYNDROME, WITH UNSPECIFIED ORGAN INVOLVEMENT: ICD-10-CM

## 2025-01-08 DIAGNOSIS — Z00.6 RESEARCH SUBJECT: Primary | ICD-10-CM

## 2025-01-08 DIAGNOSIS — R53.82 CHRONIC FATIGUE: ICD-10-CM

## 2025-01-08 DIAGNOSIS — E04.1 THYROID NODULE: ICD-10-CM

## 2025-01-08 LAB — THYROPEROXIDASE IGG SERPL-ACNC: <6 IU/ML

## 2025-01-08 PROCEDURE — 36415 COLL VENOUS BLD VENIPUNCTURE: CPT | Performed by: INTERNAL MEDICINE

## 2025-01-08 PROCEDURE — 86376 MICROSOMAL ANTIBODY EACH: CPT | Performed by: INTERNAL MEDICINE

## 2025-01-08 NOTE — PROGRESS NOTES
Visit: WEEK 28  Sponsor: [employer]   Title: -4077  IRB #: [id number]  : Neil Morales   Site: [id number]  Others Present: N/A  Pt No: [id number]     Subject presents to the clinic for Week 28 visit. Subject reports no complaints at this time.      IRT contacted and visit was registered.     Concomitant medications reviewed with the patient and updated to ensure accuracy. Refer to Concomitant Medication Log in subject binder.     Vitals:   BP: 137/99 mm Hg  Pulse: 82 bpm  Respiratory Rate: 17 rr  Temp: 98.1 F  Weight: 98.9 kg  Is Subject of Childbearing Potential: No, subject is post-menopausal. Patient was advised about the risk of pregnancy during the course of the study. Patient verbalized understanding of study visit procedures.      Assessments:  eCOA tablet compliance was reviewed, patient did not complete all required questionnaires 7 days prior to visit due to device technicalities. Retrained subject on the importance of tablet compliance and documented protocol deviation.    Patient Assessment completed via TrialMax: Yes  Patient Sjogren's Syndrome Questionnaire completed: Yes, results uploaded into EDC and filed in subject file.  Physical assessment completed: Yes  Tender/Swollen Joint Count completed: Yes , results uploaded into EDC and filed in subject file.  Investigator Assessments completed: Yes, results uploaded into EDC and filed in subject file.     Labs:  Fasting Labs and urine were collected per protocol at 10:15 AM. All labs were processed and shipped per requisition. Requisition number: [id number]  Additional Testing:  Urine Pregnancy Test required:  NO  IP Administration/Accountability:   Drug accountability completed by myself and research pharmacist.   IP returned: [id number]- 0 tabs returned [id number] - 16 tabs returned   Compliance Percentage: 100%     IP dispensed: [id number], [id number]     Adverse Events Reported: No     End of Visit:  Patient has the study-related  emergency contacts. Subject advised to contact research staff immediately for any adverse effects and if adverse effects occur after hours to report immediately to the ER for treatment. Verbalized understanding.      Week 32 visit was scheduled with the subject.     Visit registered in OnCore.   [employer]in[employer] payment? Yes

## 2025-01-08 NOTE — PROGRESS NOTES
RHEUMATOLOGY CLINIC ESTABLISHED PATIENT VISIT    Sponsor: [employer]   Title: -[id number]  IRB #: [id number]  : Neil Morales   Site: 0017  Others Present: N/A  Is LAR consenting for subject: No     Pt No: [id number]    Reason for consult:- Sjogren's syndrome; research subject    Chief complaints, HPI, ROS, EXAM, Assessment & Plans:-    Olesya Tay is a 49 y.o. pleasant female who presents to follow up for research visit today.  Currently in clinical trial for Sjogren's syndrome.  Denies any significant joint pain today . No joint swelling or prolonged morning stiffness. No significant change in fatigue.   History of angioedema.  No recent angioedema.  Diagnosed with subcentimeter thyroid nodules by primary care physician after a recent thyroid ultrasound.  Advised to repeat ultrasound in 6 months.  Rheumatologic review of systems otherwise negative.      Physical Exam    Date completed: 01/08/2025    Physician: Neil Morales MD    Head: normal  Eyes: normal  Ears: normal  Nose: normal  Lungs: normal  Heart: normal  Musculoskeletal: normal  Neurological: normal  Skin: normal  Lymph nodes: normal- Tender 1*1 cm left submandibular lymph node.  Mouth: normal  Gastrointestinal: normal    Reviewed all available old and outside pertinent medical records.    All lab results personally reviewed and interpreted by me.    1. Research subject    2. Sjogren's syndrome, with unspecified organ involvement    3. Encounter for examination for normal comparison and control in clinical research program    4. Chronic fatigue    5. Thyroid nodule          Problem List Items Addressed This Visit       Chronic fatigue    Relevant Orders    THYROID PEROXIDASE ANTIBODY    Encounter for examination for normal comparison and control in clinical research program    Research subject - Primary    Sjogren's syndrome    Thyroid nodule         Patient following up today for management of Sjogren's  disease  Positive SEAMUS, positive SSA, positive SSB, positive rheumatoid factor with dryness  Currently on hydroxychloroquine 200 mg daily and research medication.  Significant improvement of arthritis on research medication.  No significant change in fatigue.    Continue hydroxychloroquine 200 mg daily.   Continue low-dose naltrexone  Research visit today for 28 joint count  Check TPO antibody for newly diagnosed thyroid nodule .   Continue study drug        Past Medical History:   Diagnosis Date    Anemia     Constipation     Depression     Endometriosis of pelvis     GERD (gastroesophageal reflux disease)     Gestational diabetes mellitus in pregnancy     Heart murmur     Lung nodule     Osteoporosis     Sjogren's syndrome        Past Surgical History:   Procedure Laterality Date    COSMETIC SURGERY          Social History     Tobacco Use    Smoking status: Never     Passive exposure: Never    Smokeless tobacco: Never   Substance Use Topics    Alcohol use: Yes     Alcohol/week: 6.0 standard drinks of alcohol     Types: 6 Glasses of wine per week     Comment: socially    Drug use: Never       Family History   Problem Relation Name Age of Onset    Diabetes Mother Lali     Cataracts Mother Lali     Early death Mother Lali 52        hepatitis c    Arthritis Maternal Aunt Barbara         rheumatoid    Rheum arthritis Maternal Aunt Barbara     Osteoarthritis Maternal Aunt Barbara     Breast cancer Paternal Aunt Lisa Jj     Heart disease Maternal Grandfather      Macular degeneration Paternal Grandmother Hang     Cancer Paternal Grandmother Hang         stomach    Stroke Paternal Grandfather Don        Review of patient's allergies indicates:   Allergen Reactions    Doxycycline Shortness Of Breath     Elevated BP and shortness of breath    Shrimp Swelling    Prednisone Hives     Hives after taking medrol dose pack    Pcn [penicillins] Rash       Medication List with Changes/Refills   Current Medications    DILTIAZEM  (CARDIZEM CD) 180 MG 24 HR CAPSULE    TAKE 1 CAPSULE BY MOUTH ONCE DAILY.    DIPHENHYDRAMINE (BENADRYL) 25 MG CAPSULE    Take 25 mg by mouth every 6 (six) hours as needed for Itching.    EPINEPHRINE (EPIPEN) 0.3 MG/0.3 ML ATIN    Inject 0.3 mLs (0.3 mg total) into the muscle once. If symptoms not improved repeat in about 5-15 minutes - inject a second dose (pen) of 0.3 mL into muscle once. for 1 dose    ESOMEPRAZOLE (NEXIUM) 40 MG CAPSULE    TAKE 1 CAPSULE BY MOUTH ONCE DAILY BEFORE BREAKFAST.    ESTRADIOL (ESTRACE) 0.01 % (0.1 MG/GRAM) VAGINAL CREAM    Insert 1 gram twice weekly    HYDROXYCHLOROQUINE (PLAQUENIL) 200 MG TABLET    Take 1 tablet (200 mg total) by mouth once daily.    INV DEUCRAVACTINIB/PLACEBO TABLET    Take 2 tablets by mouth in the morning and 2 tablets by mouth in the evening as directed approximately 12 hours apart. For Investigational Use only. Protocol -1642 Subject ID#  [id number]    LORATADINE (CLARITIN) 10 MG TABLET    Take 10 mg by mouth once daily.    METFORMIN (GLUCOPHAGE-XR) 500 MG ER 24HR TABLET    Take 1 tablet (500 mg total) by mouth 2 (two) times daily with meals.    NATREXONE TABLET 4.5 MG    Take 1 tablet (4.5 mg total) by mouth every evening.    SERTRALINE (ZOLOFT) 50 MG TABLET    Take 1 tablet (50 mg total) by mouth once daily.    TIRZEPATIDE, WEIGHT LOSS, (ZEPBOUND) 2.5 MG/0.5 ML SOLN    Inject 0.5 mLs (2.5 mg total) into the skin every 7 days.         Disclaimer: This note was prepared using voice recognition system and is likely to have sound alike errors and is not proofread.  Please message me with any questions.

## 2025-01-14 ENCOUNTER — PATIENT MESSAGE (OUTPATIENT)
Dept: ALLERGY | Facility: CLINIC | Age: 50
End: 2025-01-14
Payer: COMMERCIAL

## 2025-02-04 DIAGNOSIS — Z00.6 RESEARCH SUBJECT: ICD-10-CM

## 2025-02-04 DIAGNOSIS — M35.00 SJOGREN'S SYNDROME, WITH UNSPECIFIED ORGAN INVOLVEMENT: ICD-10-CM

## 2025-02-13 ENCOUNTER — RESEARCH ENCOUNTER (OUTPATIENT)
Dept: RESEARCH | Facility: HOSPITAL | Age: 50
End: 2025-02-13
Payer: COMMERCIAL

## 2025-02-13 ENCOUNTER — OFFICE VISIT (OUTPATIENT)
Dept: RHEUMATOLOGY | Facility: CLINIC | Age: 50
End: 2025-02-13
Payer: COMMERCIAL

## 2025-02-13 VITALS
WEIGHT: 218.5 LBS | BODY MASS INDEX: 34.22 KG/M2 | RESPIRATION RATE: 18 BRPM | DIASTOLIC BLOOD PRESSURE: 88 MMHG | HEART RATE: 77 BPM | SYSTOLIC BLOOD PRESSURE: 140 MMHG

## 2025-02-13 DIAGNOSIS — D70.8 OTHER NEUTROPENIA: ICD-10-CM

## 2025-02-13 DIAGNOSIS — Z00.6 ENCOUNTER FOR EXAMINATION FOR NORMAL COMPARISON AND CONTROL IN CLINICAL RESEARCH PROGRAM: ICD-10-CM

## 2025-02-13 DIAGNOSIS — R53.82 CHRONIC FATIGUE: ICD-10-CM

## 2025-02-13 DIAGNOSIS — Z79.899 LONG-TERM USE OF PLAQUENIL: ICD-10-CM

## 2025-02-13 DIAGNOSIS — M35.00 SJOGREN'S SYNDROME, WITH UNSPECIFIED ORGAN INVOLVEMENT: Primary | ICD-10-CM

## 2025-02-13 NOTE — PROGRESS NOTES
Visit: WEEK 32/ Re-Consenting ICF [id number]  Sponsor: [employer]   Title: -1812  IRB #: 2023.121  : Neil Morales   Site: 0017  Others Present: N/A  Pt No: [id number]      Informed Consent:  [id number]    Consenting was completed in private area using the most current IRB approved version of the main Informed Consent Form (ICF) and ICF Addendum by myself.  The patient was given ample time to review the consents prior to execution of the main ICF and ICF Addendum.     Prior to the ICF and ICF Addendum being signed, or any study protocol required data collection, testing, procedure, or intervention being performed, the following was done and/or discussed:?   Patient was given a copy of the IC for review: yes?   Purpose of the study and qualifications to participate: yes???   Study design, follow up schedule, and tests or procedures done at each visit: yes??   Confidentiality and HIPAA Authorization for Release of Medical Records for the research trial/ subject's rights/research related injury: yes??   Risk, Benefits, Alternative Treatments, Compensation and Costs: yes??   Participation in the research trial is voluntary and patient may withdraw at anytime: yes??   Contact information for study related questions: yes??      Patient verbalizes understanding of the above: :   Contact information for CRC and PI given to patient: :   Patient able to adequately summarize: the purpose of the study, the risks associated with the study, and all procedures, testing, and follow-ups associated with the study: :     Olesya Tay signed the IRB approved version of the main ICF.? All pages of the consents were reviewed with the patient, and all questions answered satisfactorily. The patient signed the paper consent form.       Patient received a fully executed copy of same (copy of informed consent made and provided to patient). The original consents  were scanned into electronic medical records (EPIC).     Time of Consent Execution: 9:12 AM    Subject presents to the clinic for Week 32 visit. Subject reports last dose of medication was taken in the evening on 08FEB2025. Subject unable to keep appointment scheduled for 05FEB2025; therefore, she ran out of medication. Informed subject that we must make every effort to keep visits within the window required per protocol. Verbalized understanding. Site received approval from [facility] to continue with today's visit and dispense IP.    IRT contacted and visit was registered.     Concomitant medications reviewed with the patient and updated to ensure accuracy. Refer to Concomitant Medication Log in subject binder.    Vitals:   BP: 140/88 mm HG  Pulse: 77 beats per minute  Respiratory Rate: 18 breaths per minute  Temp: 97.8 degrees fahrenheit (oral)  Weight: 99.1 kg  Is Subject of Childbearing Potential: No, post-menopausal. Patient was advised about the risk of pregnancy during the course of the study. Patient verbalized understanding of study visit procedures.     Assessments:  eCOA tablet compliance was reviewed, patient completed all required questionnaires: No, subject was to completed daily PRO questionnaires 05FEB2025 - 12FEB2025. Subjected completed daily PRO questionnaires on 05FEB2025, 10FEB2025 and 11FEB2025. PK reporting questionnaire was also not completed. CRC unable to reach subject on 29JAN2025 but left voicemail reminding subject to complete all questionnaires as they are required for the study. CRC also unable to reach subject on 04FEB2025 but left voicemail reminding subject to complete daily questionnaires as well as complete PK reporting. CRC was unable to reach subject on 12FEB2025 but left voicemail reminding subject to complete daily questionnaires as well as PK reporting. Subject reports, during today's visit, having difficulty with eDiary. Subject reports that the days the PROs were incomplete, she  was unable to turn on device despite charging. Advised subject that if this occurs to contact site immediately. Subject was assigned a new eDiary device at today's visit. CRC was able to turn the device on and off, install updates, and access [employer] application. Subject to contact site immediately in the event she is unable to log questionnaires. Verbalized understanding.  Patient Assessment completed via TrialMax: Yes  Patient Sjogren's Syndrome Questionnaire completed: Yes, results uploaded into EDC and filed in subject file.  Physical assessment completed: Yes  Tender/Swollen Joint Count completed: Yes , results uploaded into EDC and filed in subject file.  Investigator Assessments completed: Yes, results uploaded into EDC and filed in subject file.    Labs:  Fasting Labs and urine were collected per protocol at 9:45 AM. All labs were processed and shipped per requisition. Requisition number: [id number]  PK Samples collected 30 minutes prior to IP administration: Yes            Predose PK Tube ID:15       Collection Time: 09:45     Additional Testing:  Urine Pregnancy Test required:  NO   Results: N/A    IP Administration/Accountability:  Subject was educated on medication administration and handling. Informed subject to return any/all unused and empty bottles at each on-site visit. Verbalized understanding. Drug accountability completed by myself and research pharmacist. IP returned: [id number], [id number]    IP dispensed: [id number], [id number]    On-site dose administered  at 10:12 AM. Patient tolerated well.     Adverse Events Reported: No    End of Visit:  Patient has the study-related emergency contacts. Subject advised to contact research staff immediately for any adverse effects and if adverse effects occur after hours to report immediately to the ER for treatment. Verbalized understanding.      Week 12 visit was scheduled with the subject.     Visit registered in OnCore.   [employer] payment? Yes

## 2025-02-13 NOTE — PROGRESS NOTES
RHEUMATOLOGY CLINIC ESTABLISHED PATIENT VISIT    Sponsor: [employer]   Title: -2141  IRB #: [id number]  : Neil Morales   Site: 0017  Others Present: N/A  Is LAR consenting for subject: No     Pt No: [id number]    Reason for consult:- Sjogren's syndrome; research subject    Chief complaints, HPI, ROS, EXAM, Assessment & Plans:-    Olesya Tay is a 49 y.o. pleasant female who presents to follow up for research visit today.  Currently in clinical trial for Sjogren's syndrome.  Denies any significant joint pain today . No joint swelling or prolonged morning stiffness. No significant change in fatigue.   History of angioedema.  No recent angioedema.  Persistent left submandibular lymphadenopathy with mild pain.  Worried about weight gain in the last couple of months.  Wondering whether this could be due to the research medication.  Rheumatologic review of systems otherwise negative.      Physical Exam    Date completed: 02/13/2025    Physician: Neil Morales MD    Head: normal  Eyes: normal  Ears: normal  Nose: normal  Lungs: normal  Heart: normal  Musculoskeletal: normal  Neurological: normal  Skin: normal  Lymph nodes: normal- Tender 1*1 cm left submandibular lymph node.  No parotitis  Mouth: normal  Gastrointestinal: normal    Reviewed all available old and outside pertinent medical records.    All lab results personally reviewed and interpreted by me.    1. Sjogren's syndrome, with unspecified organ involvement    2. Encounter for examination for normal comparison and control in clinical research program    3. Chronic fatigue    4. Long-term use of Plaquenil    5. Other neutropenia          Problem List Items Addressed This Visit       Chronic fatigue    Encounter for examination for normal comparison and control in clinical research program    Long-term use of Plaquenil    Other neutropenia    Sjogren's syndrome - Primary       Research labs shows mild  neutropenia with 1.88 absolute neutrophil count.  Labs repeated today.  No recurrent infections.  Monitor closely.  No flare of arthritis.  Persistent left submandibular lymphadenopathy with intermittent pain.  No parotitis.  Currently on hydroxychloroquine 200 mg daily and research medication.  6 lb weight gain when compared to September 20, 2024.  Advised low carb diet.  Research visit today for 28 joint count  Continue study drug        Past Medical History:   Diagnosis Date    Anemia     Constipation     Depression     Endometriosis of pelvis     GERD (gastroesophageal reflux disease)     Gestational diabetes mellitus in pregnancy     Heart murmur     Hypertension     Lung nodule     Osteoporosis     Sjogren's syndrome        Past Surgical History:   Procedure Laterality Date    COSMETIC SURGERY          Social History     Tobacco Use    Smoking status: Never     Passive exposure: Never    Smokeless tobacco: Never   Substance Use Topics    Alcohol use: Yes     Alcohol/week: 6.0 standard drinks of alcohol     Types: 6 Glasses of wine per week     Comment: socially    Drug use: Never       Family History   Problem Relation Name Age of Onset    Diabetes Mother Lali     Cataracts Mother Lali     Early death Mother Lali 52        hepatitis c    Arthritis Maternal Aunt Barbara         rheumatoid    Rheum arthritis Maternal Aunt Barbara     Osteoarthritis Maternal Aunt Barbara     Breast cancer Paternal Aunt Lisa Jj     Heart disease Maternal Grandfather      Macular degeneration Paternal Grandmother Hang     Cancer Paternal Grandmother Hang         stomach    Stroke Paternal Grandfather Don        Review of patient's allergies indicates:   Allergen Reactions    Doxycycline Shortness Of Breath     Elevated BP and shortness of breath    Shrimp Swelling    Prednisone Hives     Hives after taking medrol dose pack    Pcn [penicillins] Rash       Medication List with Changes/Refills   Current Medications    DILTIAZEM  (CARDIZEM CD) 180 MG 24 HR CAPSULE    TAKE 1 CAPSULE BY MOUTH ONCE DAILY.    DIPHENHYDRAMINE (BENADRYL) 25 MG CAPSULE    Take 25 mg by mouth every 6 (six) hours as needed for Itching.    EPINEPHRINE (EPIPEN) 0.3 MG/0.3 ML ATIN    Inject 0.3 mLs (0.3 mg total) into the muscle once. If symptoms not improved repeat in about 5-15 minutes - inject a second dose (pen) of 0.3 mL into muscle once. for 1 dose    ESOMEPRAZOLE (NEXIUM) 40 MG CAPSULE    TAKE 1 CAPSULE BY MOUTH ONCE DAILY BEFORE BREAKFAST.    ESTRADIOL (ESTRACE) 0.01 % (0.1 MG/GRAM) VAGINAL CREAM    Insert 1 gram twice weekly    HYDROXYCHLOROQUINE (PLAQUENIL) 200 MG TABLET    Take 1 tablet (200 mg total) by mouth once daily.    INV DEUCRAVACTINIB/PLACEBO TABLET    Take 2 tablets by mouth in the morning and 2 tablets by mouth in the evening as directed approximately 12 hours apart. For Investigational Use only. Protocol -3102 Subject ID#  [id number]    LORATADINE (CLARITIN) 10 MG TABLET    Take 10 mg by mouth once daily.    METFORMIN (GLUCOPHAGE-XR) 500 MG ER 24HR TABLET    Take 1 tablet (500 mg total) by mouth 2 (two) times daily with meals.    NATREXONE TABLET 4.5 MG    Take 1 tablet (4.5 mg total) by mouth every evening.    SERTRALINE (ZOLOFT) 50 MG TABLET    Take 1 tablet (50 mg total) by mouth once daily.    TIRZEPATIDE, WEIGHT LOSS, (ZEPBOUND) 2.5 MG/0.5 ML SOLN    Inject 0.5 mLs (2.5 mg total) into the skin every 7 days.         Disclaimer: This note was prepared using voice recognition system and is likely to have sound alike errors and is not proofread.  Please message me with any questions.

## 2025-02-14 DIAGNOSIS — E66.811 OBESITY (BMI 30.0-34.9): ICD-10-CM

## 2025-02-14 RX ORDER — TIRZEPATIDE 2.5 MG/.5ML
2.5 INJECTION, SOLUTION SUBCUTANEOUS
Qty: 3 ML | Refills: 1 | Status: SHIPPED | OUTPATIENT
Start: 2025-02-14

## 2025-02-24 ENCOUNTER — PATIENT MESSAGE (OUTPATIENT)
Dept: INTERNAL MEDICINE | Facility: CLINIC | Age: 50
End: 2025-02-24
Payer: COMMERCIAL

## 2025-03-04 DIAGNOSIS — Z00.6 RESEARCH SUBJECT: ICD-10-CM

## 2025-03-04 DIAGNOSIS — M35.00 SJOGREN'S SYNDROME, WITH UNSPECIFIED ORGAN INVOLVEMENT: ICD-10-CM

## 2025-03-05 ENCOUNTER — OFFICE VISIT (OUTPATIENT)
Dept: RHEUMATOLOGY | Facility: CLINIC | Age: 50
End: 2025-03-05
Payer: COMMERCIAL

## 2025-03-05 ENCOUNTER — RESEARCH ENCOUNTER (OUTPATIENT)
Dept: RESEARCH | Facility: HOSPITAL | Age: 50
End: 2025-03-05
Payer: COMMERCIAL

## 2025-03-05 VITALS
DIASTOLIC BLOOD PRESSURE: 78 MMHG | BODY MASS INDEX: 34.18 KG/M2 | WEIGHT: 218.25 LBS | SYSTOLIC BLOOD PRESSURE: 129 MMHG | HEART RATE: 84 BPM

## 2025-03-05 DIAGNOSIS — Z00.6 RESEARCH SUBJECT: ICD-10-CM

## 2025-03-05 DIAGNOSIS — D70.2 OTHER DRUG-INDUCED NEUTROPENIA: ICD-10-CM

## 2025-03-05 DIAGNOSIS — R53.82 CHRONIC FATIGUE: ICD-10-CM

## 2025-03-05 DIAGNOSIS — M35.00 SJOGREN'S SYNDROME, WITH UNSPECIFIED ORGAN INVOLVEMENT: Primary | ICD-10-CM

## 2025-03-05 DIAGNOSIS — Z00.6 ENCOUNTER FOR EXAMINATION FOR NORMAL COMPARISON AND CONTROL IN CLINICAL RESEARCH PROGRAM: ICD-10-CM

## 2025-03-05 PROBLEM — D70.9 NEUTROPENIA: Status: ACTIVE | Noted: 2025-03-05

## 2025-03-05 NOTE — PROGRESS NOTES
Visit: WEEK 36  Sponsor: [employer]   Title: -0053  IRB #: 2023.121  : Neil Morales   Site: 0017  Others Present: N/A  Pt No: [id number]     Subject presents to the clinic for Week 36 visit.     IRT contacted and visit was registered.     Concomitant medications reviewed with the patient and updated to ensure accuracy. Refer to Concomitant Medication Log in subject binder.     Vitals:   BP: 129/78 mm HG  Pulse: 84 beats per minute  Respiratory Rate: 18 breaths per minute  Temp: 98.1 degrees fahrenheit (oral)  Weight: 99.0 kg  Is Subject of Childbearing Potential: No, post-menopausal post-menopausal. Patient was advised about the risk of pregnancy during the course of the study. Patient verbalized understanding of study visit procedures.      Assessments:  eCOA tablet compliance was reviewed, patient completed all required questionnaires: No. Subject reports missed questionnaires on 18 FEB2025, 21FEB2025, 09CUT2303, 45HHL1533, 26FEB2025, 40BRJ5439. CRC contacted subject on 04MAR2025 reminding subject to complete all questionnaires. Advised subject to contact site immediately in the event she is unable to log questionnaires and reminded subject of questionnaire compliance. Verbalized understanding.   Patient Assessment completed via TrialMax: Yes  Patient Sjogren's Syndrome Questionnaire completed: Yes, results uploaded into EDC and filed in subject file.  Physical assessment completed: Yes  Tender/Swollen Joint Count completed: Yes, results uploaded into EDC and filed in subject file.  Investigator Assessments completed: Yes, results uploaded into EDC and filed in subject file.     Labs:  Fasting Labs and urine were collected per protocol at 8:20 AM. All labs were processed and shipped per requisition. Requisition number: [id number]     Additional Testing:  Urine Pregnancy Test required:  No   Results: N/A     IP Administration/Accountability:  Subject was educated on  medication administration and handling. Informed subject to return any/all unused and empty bottles at each on-site visit. Verbalized understanding. Drug accountability completed by myself and research pharmacist. IP returned: [id number] - 4 tabs returned, [id number]- 48 tabs returned. Subject reports missing 2 full morning doses that on 21VRC2504 and 47ZOH1822.     % Adherence= 95%     IP dispensed: [id number], [id number]     On-site dose administered at 9:05 AM. Patient tolerated well.      Adverse Events Reported: N/A     End of Visit:  Patient has the study-related emergency contacts. Subject advised to contact research staff immediately for any adverse effects and if adverse effects occur after hours to report immediately to the ER for treatment. Verbalized understanding.      Week 40 visit was scheduled with the subject.     Visit registered in OnCore.   [employer] payment? Yes

## 2025-03-05 NOTE — PROGRESS NOTES
RHEUMATOLOGY CLINIC ESTABLISHED PATIENT VISIT    Sponsor: [employer]   Title: -2683  IRB #: [id number]  : Neil Morales   Site: 0017  Others Present: N/A  Is LAR consenting for subject: No     Pt No: [id number]    Reason for consult:- Sjogren's syndrome; research subject    Chief complaints, HPI, ROS, EXAM, Assessment & Plans:-    Olesya Tay is a 50 y.o. pleasant female who presents to follow up for research visit today.  Currently in clinical trial for Sjogren's syndrome.  Denies any significant joint pain today . No joint swelling or prolonged morning stiffness. No significant change in fatigue.   History of angioedema.  No recent angioedema.  Persistent left submandibular lymphadenopathy with mild pain. Rheumatologic review of systems otherwise negative.      Physical Exam    Date completed: 03/05/2025    Physician: Neil Morales MD    Head: normal  Eyes: normal  Ears: normal  Nose: normal  Lungs: normal  Heart: normal  Musculoskeletal: normal  Neurological: normal  Skin: normal  Lymph nodes: normal- Tender 1*1 cm left submandibular lymph node.  No parotitis  Mouth: normal  Gastrointestinal: normal    Reviewed all available old and outside pertinent medical records.    All lab results personally reviewed and interpreted by me.    1. Sjogren's syndrome, with unspecified organ involvement    2. Research subject    3. Encounter for examination for normal comparison and control in clinical research program    4. Chronic fatigue    5. Other drug-induced neutropenia          Problem List Items Addressed This Visit       Chronic fatigue    Encounter for examination for normal comparison and control in clinical research program    Neutropenia    Research subject    Sjogren's syndrome - Primary       Research labs showed mild neutropenia with 1.88 absolute neutrophil count.  Labs repeated today.  No recurrent infections.  Monitor closely.  No flare of arthritis.   Persistent left submandibular lymphadenopathy with intermittent pain.  No parotitis.  Currently on hydroxychloroquine 200 mg daily and research medication.  Research visit today for 28 joint count  Continue study drug        Past Medical History:   Diagnosis Date    Anemia     Constipation     Depression     Endometriosis of pelvis     GERD (gastroesophageal reflux disease)     Gestational diabetes mellitus in pregnancy     Heart murmur     Hypertension     Lung nodule     Osteoporosis     Sjogren's syndrome        Past Surgical History:   Procedure Laterality Date    COSMETIC SURGERY          Social History     Tobacco Use    Smoking status: Never     Passive exposure: Never    Smokeless tobacco: Never   Substance Use Topics    Alcohol use: Yes     Alcohol/week: 6.0 standard drinks of alcohol     Types: 6 Glasses of wine per week     Comment: socially    Drug use: Never       Family History   Problem Relation Name Age of Onset    Diabetes Mother Lali     Cataracts Mother Lali     Early death Mother Lali 52        hepatitis c    Arthritis Maternal Aunt Barbara         rheumatoid    Rheum arthritis Maternal Aunt Barbara     Osteoarthritis Maternal Aunt Barbara     Breast cancer Paternal Aunt Lisa Jj     Heart disease Maternal Grandfather      Macular degeneration Paternal Grandmother Hang     Cancer Paternal Grandmother Hang         stomach    Stroke Paternal Grandfather Don        Review of patient's allergies indicates:   Allergen Reactions    Doxycycline Shortness Of Breath     Elevated BP and shortness of breath    Shrimp Swelling    Prednisone Hives     Hives after taking medrol dose pack    Pcn [penicillins] Rash       Medication List with Changes/Refills   Current Medications    DILTIAZEM (CARDIZEM CD) 180 MG 24 HR CAPSULE    TAKE 1 CAPSULE BY MOUTH ONCE DAILY.    DIPHENHYDRAMINE (BENADRYL) 25 MG CAPSULE    Take 25 mg by mouth every 6 (six) hours as needed for Itching.    EPINEPHRINE (EPIPEN) 0.3 MG/0.3 ML ATIN     Inject 0.3 mLs (0.3 mg total) into the muscle once. If symptoms not improved repeat in about 5-15 minutes - inject a second dose (pen) of 0.3 mL into muscle once. for 1 dose    ESOMEPRAZOLE (NEXIUM) 40 MG CAPSULE    TAKE 1 CAPSULE BY MOUTH ONCE DAILY BEFORE BREAKFAST.    ESTRADIOL (ESTRACE) 0.01 % (0.1 MG/GRAM) VAGINAL CREAM    Insert 1 gram twice weekly    HYDROXYCHLOROQUINE (PLAQUENIL) 200 MG TABLET    Take 1 tablet (200 mg total) by mouth once daily.    INV DEUCRAVACTINIB/PLACEBO TABLET    Take 2 tablets by mouth in the morning and 2 tablets by mouth in the evening as directed approximately 12 hours apart. For Investigational Use only. Protocol -7859 Subject ID#  [id number]    LORATADINE (CLARITIN) 10 MG TABLET    Take 10 mg by mouth once daily.    METFORMIN (GLUCOPHAGE-XR) 500 MG ER 24HR TABLET    Take 1 tablet (500 mg total) by mouth 2 (two) times daily with meals.    NATREXONE TABLET 4.5 MG    Take 1 tablet (4.5 mg total) by mouth every evening.    SERTRALINE (ZOLOFT) 50 MG TABLET    Take 1 tablet (50 mg total) by mouth once daily.    TIRZEPATIDE, WEIGHT LOSS, (ZEPBOUND) 2.5 MG/0.5 ML SOLN    Inject 0.5 mLs (2.5 mg total) into the skin every 7 days.         Disclaimer: This note was prepared using voice recognition system and is likely to have sound alike errors and is not proofread.  Please message me with any questions.

## 2025-03-07 ENCOUNTER — DOCUMENTATION ONLY (OUTPATIENT)
Dept: RESEARCH | Facility: HOSPITAL | Age: 50
End: 2025-03-07
Payer: COMMERCIAL

## 2025-03-07 ENCOUNTER — PATIENT MESSAGE (OUTPATIENT)
Dept: INTERNAL MEDICINE | Facility: CLINIC | Age: 50
End: 2025-03-07
Payer: COMMERCIAL

## 2025-03-07 NOTE — PROGRESS NOTES
Sponsor: [employer]   Title: -0043  IRB #: [id number]  : Neil Morales   Site: 0017  Pt No: [id number]      Contacted subject about [id number] IP Batch and the incorrect listed expiration date. Advised patient to bring IP to next visit and to not use IP beyond the end of May 2025. Patient verbalized understanding.

## 2025-03-11 ENCOUNTER — OFFICE VISIT (OUTPATIENT)
Dept: OPHTHALMOLOGY | Facility: CLINIC | Age: 50
End: 2025-03-11
Payer: COMMERCIAL

## 2025-03-11 ENCOUNTER — TELEPHONE (OUTPATIENT)
Dept: INTERNAL MEDICINE | Facility: CLINIC | Age: 50
End: 2025-03-11
Payer: COMMERCIAL

## 2025-03-11 DIAGNOSIS — E66.811 OBESITY (BMI 30.0-34.9): Primary | ICD-10-CM

## 2025-03-11 DIAGNOSIS — M35.01 SJOGREN'S SYNDROME WITH KERATOCONJUNCTIVITIS SICCA: Primary | ICD-10-CM

## 2025-03-11 PROCEDURE — 99499 UNLISTED E&M SERVICE: CPT | Mod: S$GLB,,, | Performed by: OPHTHALMOLOGY

## 2025-03-11 PROCEDURE — 99999 PR PBB SHADOW E&M-EST. PATIENT-LVL III: CPT | Mod: PBBFAC,,, | Performed by: OPHTHALMOLOGY

## 2025-03-11 NOTE — PROGRESS NOTES
HPI     Dry Eye            Comments: Dry eye study          Comments    1. Sjogren's syndrome  2. KCS OU  3. Rosacea  4. Long term Plaquenil use 2014 (approx)  5. Borderline diabetes  6. SCTL wearer (monova)             Last edited by Emily Simpson, Patient Care Assistant on 3/11/2025  3:27   PM.        Protocol: BMS -7499  Ophthalmology : Juan Diego Sahni MD    Appendix 4 - Ocular Assessments Worksheet    Schirmer's Test    Start and stop time should be recorded and based on a 5-minute period for testing.     Sample Collection Start Time: 4:12 pm  Sample Collection Stop Time: 4:17 pm    Measurements (mm)       Right Eye Left Eye   Leading edge (mm) 31   21   Lagging edge (mm) 28 18.5   Median value 29.5 19.5   Was moisture strip saturated (>35 mm)? Yes/No no no     Tear Break-up Test            Right Eye Left Eye   Test #1 (seconds) 10 4   Test #2 (seconds) 10 4   Test #3 (seconds) 10 4   Result of Ophthalmological Examination (seconds; TBUT is the average of 3 scores) 10 4   Is debris present? Yes/No no no       Van Bishylaterlorenza Score:     Right Eye Left Eye   Medial/Nasal Conjunctiva Grade (0 to 3) 1 1   Lateral/Temporal Conjunctiva Grade (0 to 3) 0 0   Cornea Grade (0 to 3) 0 0   Total score (0 to 9) 1 1             Assessment /Plan     For exam results, see Encounter Report.      ICD-10-CM ICD-9-CM    1. Sjogren's syndrome with keratoconjunctivitis sicca  M35.01 710.2

## 2025-03-11 NOTE — TELEPHONE ENCOUNTER
----- Message from Zaheer Prabhakar sent at 3/11/2025 10:29 AM CDT -----  Regarding: Referral  Contact: Olesya  Please place referral to [facility] for pt to be approved for weight loss medication. Please Advise.  ----- Message -----  From: Tisha Nunez, Student RN  Sent: 3/11/2025  10:25 AM CDT  To: [facility] Staff    .Type: Patient  Requesting Call BackWho Called: Rena is this regarding?: MedicationWould the patient rather a call back or a response via [facility]? Call [facility] Call Back Number: [phone] Information: Patient called in regard to prescription denial of Zepbound stating she has spoken with [employer] who says it was not denied but closed due to no response from the provider.She says you can call [employer] Authorizations [phone] or Please Call her Back.

## 2025-03-31 ENCOUNTER — PATIENT MESSAGE (OUTPATIENT)
Dept: RESEARCH | Facility: HOSPITAL | Age: 50
End: 2025-03-31
Payer: COMMERCIAL

## 2025-03-31 DIAGNOSIS — Z00.6 RESEARCH SUBJECT: ICD-10-CM

## 2025-03-31 DIAGNOSIS — M35.00 SJOGREN'S SYNDROME, WITH UNSPECIFIED ORGAN INVOLVEMENT: ICD-10-CM

## 2025-03-31 NOTE — PROGRESS NOTES
Visit: WEEK 40  Sponsor: [employer]   Title: -7312  IRB #: 2023.121  : Neil Morales   Site: 0017  Others Present: N/A  Pt No: [id number]    Subject presents to the clinic for Week 40 visit.    IRT contacted and visit was registered.     Concomitant medications reviewed with the patient and updated to ensure accuracy. Refer to Concomitant Medication Log in subject binder.    Vitals:   BP: 135/84  Pulse: 85  Respiratory Rate: 18  Temp: 97.8  Weight: 100.3 kg  Is Subject of Childbearing Potential: No, postmenopausal . Patient was advised about the risk of pregnancy during the course of the study. Patient verbalized understanding of study visit procedures.     Assessments:  eCOA tablet compliance was reviewed, patient completed all required questionnaires: No, subject was to complete daily PRO questionnaires [id number] - [id number]. Subjected completed daily PRO questionnaires on 82PLN7954 and 61DAY8726. Subject was reminded at last visit to complete the daily PRO questionnaires 7 days prior to today's visit.   Patient reports that she forgot to complete questionnaires. Subject advised to contact site  immediately if she is unable to log questionnaires. Verbalized understanding. We agreed that [facility] would contact her daily for the 7 days prior to the study visit.  Patient Assessment completed via TrialMax: Yes  Patient Sjogren's Syndrome Questionnaire completed: Yes, results uploaded into EDC and filed in subject file.  Physical assessment completed: Yes  Tender/Swollen Joint Count completed: Yes , results uploaded into EDC and filed in subject file.  Investigator Assessments completed: Yes, results uploaded into EDC and filed in subject file.    Labs:  Fasting Labs and urine were collected per protocol at 9:44 AM. All labs were processed and shipped per requisition. Requisition number: [id number]       Additional Testing:  Urine Pregnancy Test required:  No   Results:  Negative    IP Administration/Accountability:  Subject was educated on medication administration and handling. Informed subject to return any/all unused and empty bottles at each on-site visit. Verbalized understanding. Drug accountability completed by myself and research pharmacist. IP returned: [id number] = 0, [id number]  = 18     % Adherence: 101% - subject reports that two tablets from package [id number] were left at home. Subject advised to return these at her next scheduled research visit. Verbalized understanding.    IP dispensed: [id number], [id number]      On-site dose administered  at 9:49 AM. Patient tolerated well.     Adverse Events Reported: Yes  1. Excessive Weight Gain, 13 lbs in 8 months - 01APR2025 - ONGOING    End of Visit:  Patient has the study-related emergency contacts. Subject advised to contact research staff immediately for any adverse effects and if adverse effects occur after hours to report immediately to the ER for treatment. Verbalized understanding.      Week 44 visit was scheduled with the subject.     Visit registered in OnCore.   [employer] payment? Yes

## 2025-04-01 ENCOUNTER — RESEARCH ENCOUNTER (OUTPATIENT)
Dept: RESEARCH | Facility: HOSPITAL | Age: 50
End: 2025-04-01

## 2025-04-01 ENCOUNTER — RESEARCH ENCOUNTER (OUTPATIENT)
Dept: RESEARCH | Facility: HOSPITAL | Age: 50
End: 2025-04-01
Payer: COMMERCIAL

## 2025-04-01 ENCOUNTER — OFFICE VISIT (OUTPATIENT)
Dept: RHEUMATOLOGY | Facility: CLINIC | Age: 50
End: 2025-04-01
Payer: COMMERCIAL

## 2025-04-01 VITALS
DIASTOLIC BLOOD PRESSURE: 84 MMHG | SYSTOLIC BLOOD PRESSURE: 135 MMHG | TEMPERATURE: 98 F | WEIGHT: 221.13 LBS | BODY MASS INDEX: 34.63 KG/M2 | HEART RATE: 85 BPM | RESPIRATION RATE: 18 BRPM

## 2025-04-01 DIAGNOSIS — R63.5 EXCESSIVE WEIGHT GAIN: ICD-10-CM

## 2025-04-01 DIAGNOSIS — I10 PRIMARY HYPERTENSION: ICD-10-CM

## 2025-04-01 DIAGNOSIS — Z00.6 ENCOUNTER FOR EXAMINATION FOR NORMAL COMPARISON AND CONTROL IN CLINICAL RESEARCH PROGRAM: ICD-10-CM

## 2025-04-01 DIAGNOSIS — M35.00 SJOGREN'S SYNDROME, WITH UNSPECIFIED ORGAN INVOLVEMENT: Primary | ICD-10-CM

## 2025-04-01 NOTE — PROGRESS NOTES
RHEUMATOLOGY CLINIC ESTABLISHED PATIENT VISIT    Sponsor: [employer]   Title: -7138  IRB #: [id number]  : Neil Morales   Site: 0017  Others Present: N/A  Is LAR consenting for subject: No     Pt No: [id number]    Reason for consult:- Sjogren's syndrome; research subject    Chief complaints, HPI, ROS, EXAM, Assessment & Plans:-    Olesya Tay is a 50 y.o. pleasant female who presents to follow up for research visit today.  Currently in clinical trial for Sjogren's syndrome.  Denies any significant joint pain today . No joint swelling or prolonged morning stiffness. No significant change in fatigue.   History of angioedema.  No recent angioedema.  No lymphadenopathy today.  Worried about unintentional weight gain without change in diet.  Recently started walking regularly.  Rheumatologic review of systems otherwise negative.      Physical Exam    Date completed:  April 1, 2025    Physician: Neil Morales MD    Head: normal  Eyes: normal  Ears: normal  Nose: normal  Lungs: normal  Heart: normal  Musculoskeletal: normal  Neurological: normal  Skin: normal  Lymph nodes: normal-  No parotitis  Mouth: normal  Gastrointestinal: normal    Reviewed all available old and outside pertinent medical records.    All lab results personally reviewed and interpreted by me.    1. Sjogren's syndrome, with unspecified organ involvement    2. Primary hypertension    3. Encounter for examination for normal comparison and control in clinical research program    4. Excessive weight gain- 13 lbs in 8 months          Problem List Items Addressed This Visit       Encounter for examination for normal comparison and control in clinical research program    Excessive weight gain- 13 lbs in 8 months    Primary hypertension    Sjogren's syndrome - Primary         Research labs showed mild neutropenia with 1.88 absolute neutrophil count.  Labs repeated last visit showed normal white count and  neutrophil count.  No recurrent infections.  Monitor closely.  Significant improvement of arthritis and sicca syndrome on research medication.  No flare of arthritis.  No lymphadenopathy on exam today.  No parotitis.  Currently on hydroxychloroquine 200 mg daily and research medication.  We will start reducing Plaquenil to every other day next visit.  Research visit today for 28 joint count  Unexplained mild weight gain.  Looked up the study medication in other clinical trials.  No reports of weight gain.  Weight gain unrelated to study medication.  Advised low carb diet and recommended to exercise regularly.  Continue study drug        Past Medical History:   Diagnosis Date    Anemia     Constipation     Depression     Endometriosis of pelvis     GERD (gastroesophageal reflux disease)     Gestational diabetes mellitus in pregnancy     Heart murmur     Hypertension     Lung nodule     Osteoporosis     Sjogren's syndrome        Past Surgical History:   Procedure Laterality Date    COSMETIC SURGERY          Social History     Tobacco Use    Smoking status: Never     Passive exposure: Never    Smokeless tobacco: Never   Substance Use Topics    Alcohol use: Yes     Alcohol/week: 6.0 standard drinks of alcohol     Types: 6 Glasses of wine per week     Comment: socially    Drug use: Never       Family History   Problem Relation Name Age of Onset    Diabetes Mother Lali     Cataracts Mother Lali     Early death Mother Lali 52        hepatitis c    Arthritis Maternal Aunt Barbara         rheumatoid    Rheum arthritis Maternal Aunt Barbara     Osteoarthritis Maternal Aunt Barbara     Breast cancer Paternal Aunt Lisa Jj     Heart disease Maternal Grandfather      Macular degeneration Paternal Grandmother Hang     Cancer Paternal Grandmother Hang         stomach    Stroke Paternal Grandfather Don        Review of patient's allergies indicates:   Allergen Reactions    Doxycycline Shortness Of Breath     Elevated BP and shortness  of breath    Shrimp Swelling    Prednisone Hives     Hives after taking medrol dose pack    Pcn [penicillins] Rash       Medication List with Changes/Refills   Current Medications    DILTIAZEM (CARDIZEM CD) 180 MG 24 HR CAPSULE    TAKE 1 CAPSULE BY MOUTH ONCE DAILY.    DIPHENHYDRAMINE (BENADRYL) 25 MG CAPSULE    Take 25 mg by mouth every 6 (six) hours as needed for Itching.    EPINEPHRINE (EPIPEN) 0.3 MG/0.3 ML ATIN    Inject 0.3 mLs (0.3 mg total) into the muscle once. If symptoms not improved repeat in about 5-15 minutes - inject a second dose (pen) of 0.3 mL into muscle once. for 1 dose    ESOMEPRAZOLE (NEXIUM) 40 MG CAPSULE    TAKE 1 CAPSULE BY MOUTH ONCE DAILY BEFORE BREAKFAST.    ESTRADIOL (ESTRACE) 0.01 % (0.1 MG/GRAM) VAGINAL CREAM    Insert 1 gram twice weekly    HYDROXYCHLOROQUINE (PLAQUENIL) 200 MG TABLET    Take 1 tablet (200 mg total) by mouth once daily.    INV DEUCRAVACTINIB/PLACEBO TABLET    Take 2 tablets by mouth in the morning and 2 tablets by mouth in the evening as directed approximately 12 hours apart. For Investigational Use only. Protocol -0386 Subject ID#  [id number]    LORATADINE (CLARITIN) 10 MG TABLET    Take 10 mg by mouth once daily.    METFORMIN (GLUCOPHAGE-XR) 500 MG ER 24HR TABLET    Take 1 tablet (500 mg total) by mouth 2 (two) times daily with meals.    NATREXONE TABLET 4.5 MG    Take 1 tablet (4.5 mg total) by mouth every evening.    NYSTATIN-TRIAMCINOLONE (MYCOLOG II) CREAM    Apply topically 2 (two) times daily.    SERTRALINE (ZOLOFT) 50 MG TABLET    Take 1 tablet (50 mg total) by mouth once daily.    TIRZEPATIDE, WEIGHT LOSS, (ZEPBOUND) 2.5 MG/0.5 ML SOLN    Inject 0.5 mLs (2.5 mg total) into the skin every 7 days.         Disclaimer: This note was prepared using voice recognition system and is likely to have sound alike errors and is not proofread.  Please message me with any questions.

## 2025-04-28 DIAGNOSIS — M35.00 SJOGREN'S SYNDROME, WITH UNSPECIFIED ORGAN INVOLVEMENT: Primary | ICD-10-CM

## 2025-04-28 DIAGNOSIS — Z00.6 RESEARCH SUBJECT: ICD-10-CM

## 2025-04-29 ENCOUNTER — OFFICE VISIT (OUTPATIENT)
Dept: RHEUMATOLOGY | Facility: CLINIC | Age: 50
End: 2025-04-29
Payer: COMMERCIAL

## 2025-04-29 ENCOUNTER — RESEARCH ENCOUNTER (OUTPATIENT)
Dept: RESEARCH | Facility: HOSPITAL | Age: 50
End: 2025-04-29
Payer: COMMERCIAL

## 2025-04-29 VITALS
HEART RATE: 82 BPM | BODY MASS INDEX: 34.77 KG/M2 | SYSTOLIC BLOOD PRESSURE: 142 MMHG | DIASTOLIC BLOOD PRESSURE: 92 MMHG | WEIGHT: 222 LBS | TEMPERATURE: 98 F

## 2025-04-29 DIAGNOSIS — R53.82 CHRONIC FATIGUE: ICD-10-CM

## 2025-04-29 DIAGNOSIS — M35.00 SJOGREN'S SYNDROME, WITH UNSPECIFIED ORGAN INVOLVEMENT: ICD-10-CM

## 2025-04-29 DIAGNOSIS — K59.00 CONSTIPATION, UNSPECIFIED CONSTIPATION TYPE: ICD-10-CM

## 2025-04-29 DIAGNOSIS — Z00.6 RESEARCH SUBJECT: Primary | ICD-10-CM

## 2025-04-29 NOTE — PROGRESS NOTES
Visit: WEEK 36  Sponsor: [employer]   Title: -8558  IRB #: 2023.121  : Neil Morales   Site: 0017  Others Present: N/A  Pt No: [id number]     Subject presents to the clinic for Week 44 visit. Subject reports unchanged symptoms from previous visit, and feel overall much improvement symptom wise including dry mouth and eyes. Subject also reports morning stifness lasting less than 10 minutes.      IRT contacted and visit was registered.     Concomitant medications reviewed with the patient and updated to ensure accuracy. Refer to Concomitant Medication Log in subject binder.     Vitals:   BP: 142/92 mm HG  Pulse: 82 beats per minute  Respiratory Rate: 17 breaths per minute  Temp: 98.1 degrees fahrenheit (oral)  Weight: 100.2 kg  Is Subject of Childbearing Potential: No, postmenopausal. Patient was advised about the risk of pregnancy during the course of the study. Patient verbalized understanding of study visit procedures.      Assessments:  eCOA tablet compliance was reviewed, patient completed all required questionnaires: NO. Subject was to complete daily PRO questionnaires [id number]. Subjected did not completed daily PRO questionnaires on 28CTL8811, 39IXG6625, 88WXK2702, 69YMV2834, 12YQC6616, 46IWN9845, 36LXH7128, 56FZB3088. Subject was reminded at last visit and on 22APR2025 via phone to complete the daily PRO questionnaires 7 days prior to today's visit. Patient reports that she forgot to complete questionnaires. Subject advised to contact site  immediately if she is unable to log questionnaires. Verbalized understanding. PK log date was entered in [employer] incorrectly, but subject completed entry on 27APR2025 at 20:51 PM  Patient Assessment completed via [facility]areMax: Yes  Patient Sjogren's Syndrome Questionnaire completed: Yes, results uploaded into EDC and filed in subject file.  Physical assessment completed: Yes  Tender/Swollen Joint Count completed: Yes,  results uploaded into EDC and filed in subject file.  Investigator Assessments completed: Yes, results uploaded into EDC and filed in subject file.     Labs:  Fasting Labs and urine were collected per protocol at 11:40AM. All labs were processed and shipped per requisition. Requisition number: [id number]     Additional Testing:  Urine Pregnancy Test required:  No   Results: N/A     IP Administration/Accountability:  Subject was educated on medication administration and handling. Informed subject to return any/all unused and empty bottles at each on-site visit. Verbalized understanding. Drug accountability completed by myself and research pharmacist. IP returned: 16. Subject reports missing morning dose on 46NFG1667 and 91SCW2669 morning dose due to fasting for labs.      % Adherence: 100%    IP dispensed: 223009, 275715     On-site dose administered  at 12:20PM. Patient tolerated well.      Adverse Events Reported: Mild allergic reaction from shrimp on 53YMC4602     End of Visit:  Patient has the study-related emergency contacts. Subject advised to contact research staff immediately for any adverse effects and if adverse effects occur after hours to report immediately to the ER for treatment. Verbalized understanding.      Week 48 visit was scheduled with the subject.     Visit registered in OnCore.   [employer] payment? Yes

## 2025-04-29 NOTE — PROGRESS NOTES
RHEUMATOLOGY CLINIC ESTABLISHED PATIENT VISIT    Sponsor: [employer]   Title: -8253- A Study to Evaluate the Efficacy and Safety of Deucravacitinib in Adults With Active Sjögren's Syndrome   IRB #: [id number]  : Neil Morales   Site: 0017  Others Present: N/A  Is LAR consenting for subject: No     Pt No: [id number]    Reason for consult:- Sjogren's syndrome; research subject    Chief complaints, HPI, ROS, EXAM, Assessment & Plans:-    Olesya Tay is a 50 y.o. pleasant female who presents to follow up for research visit today.  Currently in clinical trial for Sjogren's syndrome.  Denies any significant joint pain today . No joint swelling or prolonged morning stiffness.  Persistent fatigue.   History of angioedema.  No recent angioedema.  No worsening lymphadenopathy.  Worsening constipation.  No improvement on Colace.  Mild intermittent epigastric pain.  Rheumatologic review of systems otherwise negative.      Physical Exam    Date completed:  April 29, 2025    Physician: Neil Morales MD    Head: normal  Eyes: normal  Ears: normal  Nose: normal  Lungs: normal  Heart: normal  Musculoskeletal: normal  Neurological: normal  Skin: normal  Lymph nodes: normal-  No parotitis, nontender less than 1 cm left submandibular lymph node.  Mouth: normal  Gastrointestinal: normal    Reviewed all available old and outside pertinent medical records.    All lab results personally reviewed and interpreted by me.    1. Research subject    2. Sjogren's syndrome, with unspecified organ involvement    3. Chronic fatigue          Problem List Items Addressed This Visit       Chronic fatigue    Research subject - Primary    Sjogren's syndrome         Seropositive Sjogren's on the Deucravacitinib clinical trial   Significant improvement of arthritis and sicca syndrome on research medication.    No parotitis.  But no improvement of fatigue.  Currently on hydroxychloroquine 200 mg daily  and research medication.  We will start reducing Plaquenil to every other day in future.   Research visit today for 28 joint count  Advised to try high fiber diet and milk of magnesia for constipation.    Continue study drug        Past Medical History:   Diagnosis Date    Anemia     Constipation     Depression     Endometriosis of pelvis     GERD (gastroesophageal reflux disease)     Gestational diabetes mellitus in pregnancy     Heart murmur     Hypertension     Lung nodule     Osteoporosis     Sjogren's syndrome        Past Surgical History:   Procedure Laterality Date    COSMETIC SURGERY          Social History     Tobacco Use    Smoking status: Never     Passive exposure: Never    Smokeless tobacco: Never   Substance Use Topics    Alcohol use: Yes     Alcohol/week: 6.0 standard drinks of alcohol     Types: 6 Glasses of wine per week     Comment: socially    Drug use: Never       Family History   Problem Relation Name Age of Onset    Diabetes Mother Lali     Cataracts Mother Lali     Early death Mother Lali 52        hepatitis c    Arthritis Maternal Aunt Barbara         rheumatoid    Rheum arthritis Maternal Aunt Barbara     Osteoarthritis Maternal Aunt Barbara     Breast cancer Paternal Aunt Lisa Jj     Heart disease Maternal Grandfather      Macular degeneration Paternal Grandmother Hang     Cancer Paternal Grandmother Hang         stomach    Stroke Paternal Grandfather Don        Review of patient's allergies indicates:   Allergen Reactions    Doxycycline Shortness Of Breath     Elevated BP and shortness of breath    Shrimp Swelling    Prednisone Hives     Hives after taking medrol dose pack    Pcn [penicillins] Rash       Medication List with Changes/Refills   Current Medications    DILTIAZEM (CARDIZEM CD) 180 MG 24 HR CAPSULE    TAKE 1 CAPSULE BY MOUTH ONCE DAILY.    DIPHENHYDRAMINE (BENADRYL) 25 MG CAPSULE    Take 25 mg by mouth every 6 (six) hours as needed for Itching.    EPINEPHRINE (EPIPEN) 0.3 MG/0.3  ML ATIN    Inject 0.3 mLs (0.3 mg total) into the muscle once. If symptoms not improved repeat in about 5-15 minutes - inject a second dose (pen) of 0.3 mL into muscle once. for 1 dose    ESOMEPRAZOLE (NEXIUM) 40 MG CAPSULE    TAKE 1 CAPSULE BY MOUTH ONCE DAILY BEFORE BREAKFAST.    ESTRADIOL (ESTRACE) 0.01 % (0.1 MG/GRAM) VAGINAL CREAM    Insert 1 gram twice weekly    HYDROXYCHLOROQUINE (PLAQUENIL) 200 MG TABLET    Take 1 tablet (200 mg total) by mouth once daily.    INV DEUCRAVACTINIB/PLACEBO TABLET    Take 2 tablets by mouth in the morning and 2 tablets by mouth in the evening as directed approximately 12 hours apart. For Investigational Use only. Protocol -2954 Subject ID#  [id number]    LORATADINE (CLARITIN) 10 MG TABLET    Take 10 mg by mouth once daily.    METFORMIN (GLUCOPHAGE-XR) 500 MG ER 24HR TABLET    Take 1 tablet (500 mg total) by mouth 2 (two) times daily with meals.    NATREXONE TABLET 4.5 MG    Take 1 tablet (4.5 mg total) by mouth every evening.    NYSTATIN-TRIAMCINOLONE (MYCOLOG II) CREAM    Apply topically 2 (two) times daily.    SERTRALINE (ZOLOFT) 50 MG TABLET    Take 1 tablet (50 mg total) by mouth once daily.    TIRZEPATIDE, WEIGHT LOSS, (ZEPBOUND) 2.5 MG/0.5 ML SOLN    Inject 0.5 mLs (2.5 mg total) into the skin every 7 days.         Disclaimer: This note was prepared using voice recognition system and is likely to have sound alike errors and is not proofread.  Please message me with any questions.

## 2025-05-29 ENCOUNTER — OFFICE VISIT (OUTPATIENT)
Dept: RHEUMATOLOGY | Facility: CLINIC | Age: 50
End: 2025-05-29

## 2025-05-29 DIAGNOSIS — G47.33 OSA ON CPAP: ICD-10-CM

## 2025-05-29 DIAGNOSIS — R53.82 CHRONIC FATIGUE: ICD-10-CM

## 2025-05-29 DIAGNOSIS — E66.811 OBESITY (BMI 30.0-34.9): ICD-10-CM

## 2025-05-29 DIAGNOSIS — Z00.6 RESEARCH SUBJECT: Primary | ICD-10-CM

## 2025-05-29 DIAGNOSIS — M35.00 SJOGREN'S SYNDROME, WITH UNSPECIFIED ORGAN INVOLVEMENT: ICD-10-CM

## 2025-05-29 RX ORDER — TIRZEPATIDE 2.5 MG/.5ML
2.5 INJECTION, SOLUTION SUBCUTANEOUS
Qty: 6 ML | Refills: 0 | Status: SHIPPED | OUTPATIENT
Start: 2025-05-29

## 2025-05-29 NOTE — TELEPHONE ENCOUNTER
Refill Routing Note   Medication(s) are not appropriate for processing by [facility] for the following reason(s):        Required labs outdated    [facility] action(s):  Defer   Requires labs : Yes             Appointments  past 12m or future 3m with PCP    Date Provider   Last Visit   12/17/2024 Beena Kwon MD   Next Visit   Visit date not found Beena Kwon MD   ED visits in past 90 days: 0        Note composed:10:45 AM 05/29/2025

## 2025-05-29 NOTE — PROGRESS NOTES
The patient location is: [place]  The chief complaint leading to consultation is: Follow up for Sjogren's trial    Visit type: audiovisual    Face to Face time with patient: 10 minutes  Each patient to whom he or she provides medical services by telemedicine is:  (1) informed of the relationship between the physician and patient and the respective role of any other health care provider with respect to management of the patient; and (2) notified that he or she may decline to receive medical services by telemedicine and may withdraw from such care at any time.    Notes:   Sponsor: [employer]   Title: -0842- A Study to Evaluate the Efficacy and Safety of Deucravacitinib in Adults With Active Sjögren's Syndrome   IRB #: [id number]  : Neil Morales   Site: 0017  Others Present: N/A  Is LAR consenting for subject: No     Pt No: [id number]    Chief complaints, HPI, ROS, EXAM, Assessment & Plans:-    Olesya Tay is a 50 y.o. pleasant female seen today for follow up for research visit today.  Currently in clinical trial for Sjogren's syndrome.  Denies any significant joint pain today . No joint swelling or prolonged morning stiffness.  Persistent fatigue.  Woke up feeling unwell today with palpitations and shortness of breath. Checked her BP which was high. Symptoms improved after taking her HTN meds. Also not using her CPAP machine consistently. Have been noticing intermittent flares of her sleep apnea recently.    No worsening lymphadenopathy.  Rheumatologic review of systems otherwise negative.      Physical Exam- Virtual    Date completed:  May 29, 2025    Physician: Neil Morales MD    Head: normal  Eyes: normal  Ears: normal  Nose: normal  Musculoskeletal: normal 100% fist formation bilateral hands.   Skin: normal    Reviewed all available old and outside pertinent medical records.    All lab results personally reviewed and interpreted by me.    1. Research  subject    2. Sjogren's syndrome, with unspecified organ involvement    3. Chronic fatigue          Problem List Items Addressed This Visit       Chronic fatigue    Research subject - Primary    Sjogren's syndrome         Seropositive Sjogren's on the Deucravacitinib clinical trial   Significant improvement of arthritis and sicca syndrome on research medication.    No parotitis.  But no improvement of fatigue.  Currently on hydroxychloroquine 200 mg daily and research medication.  We will start reducing Plaquenil to every other day in future.  Continue study drug  Advised to improve compliance with CPAP machine .         Past Medical History:   Diagnosis Date    Anemia     Constipation     Depression     Endometriosis of pelvis     GERD (gastroesophageal reflux disease)     Gestational diabetes mellitus in pregnancy     Heart murmur     Hypertension     Lung nodule     Osteoporosis     Sjogren's syndrome        Past Surgical History:   Procedure Laterality Date    COSMETIC SURGERY          Social History     Tobacco Use    Smoking status: Never     Passive exposure: Never    Smokeless tobacco: Never   Substance Use Topics    Alcohol use: Yes     Alcohol/week: 6.0 standard drinks of alcohol     Types: 6 Glasses of wine per week     Comment: socially    Drug use: Never       Family History   Problem Relation Name Age of Onset    Diabetes Mother Lali     Cataracts Mother Lali     Early death Mother Lali 52        hepatitis c    Arthritis Maternal Aunt Barbara         rheumatoid    Rheum arthritis Maternal Aunt Barbara     Osteoarthritis Maternal Aunt Barbara     Breast cancer Paternal Aunt Lisa Jj     Heart disease Maternal Grandfather      Macular degeneration Paternal Grandmother Hang     Cancer Paternal Grandmother Hang         stomach    Stroke Paternal Grandfather Don        Review of patient's allergies indicates:   Allergen Reactions    Doxycycline Shortness Of Breath     Elevated BP and shortness of breath     Shrimp Swelling    Prednisone Hives     Hives after taking medrol dose pack    Pcn [penicillins] Rash       Medication List with Changes/Refills   Current Medications    DILTIAZEM (CARDIZEM CD) 180 MG 24 HR CAPSULE    TAKE 1 CAPSULE BY MOUTH ONCE DAILY.    DIPHENHYDRAMINE (BENADRYL) 25 MG CAPSULE    Take 25 mg by mouth every 6 (six) hours as needed for Itching.    EPINEPHRINE (EPIPEN) 0.3 MG/0.3 ML ATIN    Inject 0.3 mLs (0.3 mg total) into the muscle once. If symptoms not improved repeat in about 5-15 minutes - inject a second dose (pen) of 0.3 mL into muscle once. for 1 dose    ESOMEPRAZOLE (NEXIUM) 40 MG CAPSULE    TAKE 1 CAPSULE BY MOUTH ONCE DAILY BEFORE BREAKFAST.    ESTRADIOL (ESTRACE) 0.01 % (0.1 MG/GRAM) VAGINAL CREAM    Insert 1 gram twice weekly    HYDROXYCHLOROQUINE (PLAQUENIL) 200 MG TABLET    Take 1 tablet (200 mg total) by mouth once daily.    INV DEUCRAVACTINIB/PLACEBO TABLET    Take 2 tablets by mouth in the morning and 2 tablets by mouth in the evening as directed approximately 12 hours apart. For Investigational Use only. Protocol -7540 Subject ID#  [id number]    LORATADINE (CLARITIN) 10 MG TABLET    Take 10 mg by mouth once daily.    METFORMIN (GLUCOPHAGE-XR) 500 MG ER 24HR TABLET    Take 1 tablet (500 mg total) by mouth 2 (two) times daily with meals.    NATREXONE TABLET 4.5 MG    Take 1 tablet (4.5 mg total) by mouth every evening.    NYSTATIN-TRIAMCINOLONE (MYCOLOG II) CREAM    Apply topically 2 (two) times daily.    SERTRALINE (ZOLOFT) 50 MG TABLET    Take 1 tablet (50 mg total) by mouth once daily.    TIRZEPATIDE, WEIGHT LOSS, (ZEPBOUND) 2.5 MG/0.5 ML SOLN    Inject 0.5 mLs (2.5 mg total) into the skin every 7 days.         Disclaimer: This note was prepared using voice recognition system and is likely to have sound alike errors and is not proofread.  Please message me with any questions.

## 2025-05-29 NOTE — TELEPHONE ENCOUNTER
Care Due:                  Date            Visit Type   Department     Provider  --------------------------------------------------------------------------------                                EP -                              PRIMARY      [facility] INTERNAL  Last Visit: 12-      CARE ([facility])   MEDICINE       Beena Kwon  Next Visit: None Scheduled  None         None Found                                                            Last  Test          Frequency    Reason                     Performed    Due Date  --------------------------------------------------------------------------------    HBA1C.......  6 months...  metFORMIN, tirzepatide,..  11- 05-    [facility] Embedded Care Due Messages. Reference number: [id number].   5/29/2025 8:46:41 AM CDT

## 2025-05-30 DIAGNOSIS — Z00.6 RESEARCH SUBJECT: ICD-10-CM

## 2025-05-30 DIAGNOSIS — M35.00 SJOGREN'S SYNDROME, WITH UNSPECIFIED ORGAN INVOLVEMENT: Primary | ICD-10-CM

## 2025-06-02 ENCOUNTER — RESEARCH ENCOUNTER (OUTPATIENT)
Dept: RESEARCH | Facility: HOSPITAL | Age: 50
End: 2025-06-02

## 2025-06-23 DIAGNOSIS — Z00.6 RESEARCH EXAM: ICD-10-CM

## 2025-06-23 DIAGNOSIS — Z00.6 RESEARCH SUBJECT: ICD-10-CM

## 2025-06-23 DIAGNOSIS — M35.00 SJOGREN'S SYNDROME, WITH UNSPECIFIED ORGAN INVOLVEMENT: Primary | ICD-10-CM

## 2025-06-24 ENCOUNTER — RESEARCH ENCOUNTER (OUTPATIENT)
Dept: RESEARCH | Facility: HOSPITAL | Age: 50
End: 2025-06-24
Payer: COMMERCIAL

## 2025-06-24 ENCOUNTER — HOSPITAL ENCOUNTER (OUTPATIENT)
Dept: CARDIOLOGY | Facility: HOSPITAL | Age: 50
Discharge: HOME | End: 2025-06-24
Payer: COMMERCIAL

## 2025-06-24 ENCOUNTER — OFFICE VISIT (OUTPATIENT)
Dept: RHEUMATOLOGY | Facility: CLINIC | Age: 50
End: 2025-06-24
Payer: COMMERCIAL

## 2025-06-24 ENCOUNTER — TELEPHONE (OUTPATIENT)
Dept: CARDIOLOGY | Facility: CLINIC | Age: 50
End: 2025-06-24
Payer: COMMERCIAL

## 2025-06-24 ENCOUNTER — RESULTS FOLLOW-UP (OUTPATIENT)
Dept: RHEUMATOLOGY | Facility: CLINIC | Age: 50
End: 2025-06-24

## 2025-06-24 ENCOUNTER — OFFICE VISIT (OUTPATIENT)
Dept: OPHTHALMOLOGY | Facility: CLINIC | Age: 50
End: 2025-06-24
Payer: COMMERCIAL

## 2025-06-24 VITALS
DIASTOLIC BLOOD PRESSURE: 71 MMHG | BODY MASS INDEX: 34.36 KG/M2 | SYSTOLIC BLOOD PRESSURE: 117 MMHG | WEIGHT: 219.38 LBS | RESPIRATION RATE: 18 BRPM | HEART RATE: 85 BPM | TEMPERATURE: 98 F

## 2025-06-24 DIAGNOSIS — R76.8 RHEUMATOID FACTOR POSITIVE: ICD-10-CM

## 2025-06-24 DIAGNOSIS — Z00.6 RESEARCH EXAM: ICD-10-CM

## 2025-06-24 DIAGNOSIS — M35.00 SJOGREN'S SYNDROME, WITH UNSPECIFIED ORGAN INVOLVEMENT: ICD-10-CM

## 2025-06-24 DIAGNOSIS — M35.01 SJOGREN'S SYNDROME WITH KERATOCONJUNCTIVITIS SICCA: Primary | ICD-10-CM

## 2025-06-24 DIAGNOSIS — Z79.899 LONG-TERM USE OF PLAQUENIL: ICD-10-CM

## 2025-06-24 DIAGNOSIS — M35.00 SJOGREN'S SYNDROME, WITH UNSPECIFIED ORGAN INVOLVEMENT: Primary | ICD-10-CM

## 2025-06-24 DIAGNOSIS — R94.31 ABNORMAL ECG: Primary | ICD-10-CM

## 2025-06-24 LAB
OHS QRS DURATION: 76 MS
OHS QTC CALCULATION: 398 MS

## 2025-06-24 PROCEDURE — 93005 ELECTROCARDIOGRAM TRACING: CPT

## 2025-06-24 NOTE — PROGRESS NOTES
Visit: Week 52 Rollover/ Consenting Optional Long Term Extension   Sponsor: [employer]   Title: -3622  IRB #: [id number]  : Neil Morales   Site: 0017  Others Present: N/A  Pt No: [id number]    Informed Consent:  [id number] dated 24Jun25     Consenting was completed in private area using the most current IRB approved version of the main Informed Consent Form (ICF) and ICF Addendum by myself. The patient was given ample time to review the consents prior to execution of the main ICF and ICF Addendum.     Prior to the ICF and ICF Addendum being signed, or any study protocol required data collection, testing, procedure, or intervention being performed, the following was done and/or discussed:?   Patient was given a copy of the IC for review: yes?   Purpose of the study and qualifications to participate: yes???   Study design, follow up schedule, and tests or procedures done at each visit: yes??   Confidentiality and HIPAA Authorization for Release of Medical Records for the research trial/ subject's rights/research related injury: yes??   Risk, Benefits, Alternative Treatments, Compensation and Costs: yes??   Participation in the research trial is voluntary and patient may withdraw at anytime: yes??   Contact information for study related questions: yes??      Patient verbalizes understanding of the above: Yes  Contact information for CRC and PI given to patient: Yes  Patient able to adequately summarize: the purpose of the study, the risks associated with the study, and all procedures, testing, and follow-ups associated with the study: Yes     Olesya Jj Tay signed the IRB approved version of the main ICF.? All pages of the consents were reviewed with the patient, and all questions answered satisfactorily. The patient signed the paper consent form.       Patient received a fully executed copy of same (copy of informed consent made and provided to patient). The original  consents were scanned into electronic medical records (EPIC).     Time of Consent Execution: 9:10 AM    Additionally patient corrected previous signed consent form, subject agreed to participate in optional long term extension, and exit interview.     Subject presents to the clinic for Long Term Extension visit. Subject reports improved symptoms overall, including joint pain/swelling and improved eye and oral dryness. Subject states mild lymph node swelling. Subject inquired recent lab results and concerns of risk from trial. Both PI and CRC reiterated participation in extension trial is voluntary and PI reviewed lab results.     IRT contacted and visit was registered.     Inclusion/Exclusion Criteria Met: Yes  Concomitant medications and medical history reviewed with the patient, and updated to ensure accuracy. Refer to Medical history Log and Concomitant Medication Log in subject binder.    Vitals:   BP: 117/71 mm HG  Pulse: 85 beats per minute  Respiratory Rate: 18 breaths per minute  Temp: 98.2 degrees fahrenheit (oral)  Weight: 99.5 kg  Is Subject of Childbearing Potential: No, postmenopausal. Patient was advised about the risk of pregnancy during the course of the study. Patient verbalized understanding of study visit procedures.     Assessments:  eCOA tablet compliance was reviewed, patient completed all required questionnaires. NO. Subject did not complete questionnaires on [id number], 15-40Hkp40, 18-45Znm24. After previous visit informed subject to complete all study questionnaires and patient verbalized understanding. CRC then contacted patient on 20Jun2025 to remind subject to complete all questionnaires, patient verbalized understanding  Patient Assessment completed via TrialMax: Yes  Patient Sjogren's Syndrome Questionnaire completed: Yes, results uploaded into EDC and filed in subject file.  Physical assessment completed: Yes  Tender/Swollen Joint Count completed: Yes , results uploaded into EDC and  filed in subject file.  Investigator Assessments completed: Yes, results uploaded into EDC and filed in subject file.    Labs:  Fasting Labs and urine were collected per protocol at 9:50 AM. All labs were processed and shipped per requisition. Requisition number: [id number]  Flow Cytometry collected: Yes  Biomarkers collected: Yes  PK Samples collected: Yes            Predose PK Tube ID:[id number]        Collection Time: 9:50 AM     Additional Testing:  Salivary Flow Test performed: Yes                Stimulated with paraffin: Yes Weight: 2.30 grams (Pre-sample collection vial: 2.27 grams) Start Time: 10:41 AM  Stop Time: 10:46 AM        Unstimulated: Yes Weight: 2.29 grams (Pre-sample collection vial: 2.27 grams) Start Time: 10:34 AM   Stop Time:10:39 AM   Local EKG performed: Yes, results uploaded into EDC and filed in subject file    Subject was educated on medication administration and handling. Informed her to return any/all unused and empty bottles at each on-site visit. Verbalized understanding. Educated subject on the importance of compliance and taking medication as prescribed. Verbalized understanding.   IP dispensed: [id number], [id number]    Subject reports missing two full day that occurred on 03Jun25 and 05Jun25. Patient stated she took IP out of blister card and sat on counter and forgot to take IP for both days. CRC reminded subject the importance of taking IP consistently per protocol. Subject asked if they could use pill organizer, CRC explained importance of keeping IP in Blister cards and not remove .     % Adherence:     First dose administered on-site at 10:50 AM. Patient tolerated well. Subject was monitored post-dose following administration. No adverse effects noted.    Adverse Events Reported: No    End of Visit:  Patient has the study-related emergency contacts. Subject advised to contact research staff immediately for any adverse effects and if adverse effects occur after hours to  report immediately to the ER for treatment. Verbalized understanding.      Week 56 visit was scheduled with the subject.     Visit registered in [employer].   [employer] payment? Yes

## 2025-06-24 NOTE — PROGRESS NOTES
Rheumatology clinic research follow-up visit    Sponsor: "[employer]   Title: -3676- A Study to Evaluate the Efficacy and Safety of Deucravacitinib in Adults With Active Sjögren's Syndrome   IRB #: [id number]  : Neil Morales   Site: 0017  Others Present: N/A  Is LAR consenting for subject: No     Pt No: [id number]    Chief complaints, HPI, ROS, EXAM, Assessment & Plans:-    Olesya Tay is a 50 y.o. pleasant female seen today for follow up for research visit today.  Currently in clinical trial for Sjogren's syndrome.  Denies any significant joint pain today . No joint swelling or prolonged morning stiffness.  Persistent fatigue.  She is wondering whether her arthritis is improved on study medication or Plaquenil.  She is concerned about the risk of malignancy with immunosuppressive therapy.   No worsening lymphadenopathy.  Rheumatologic review of systems otherwise negative.      Physical Exam    Date completed:     Physician: Neil Morales MD    Head: normal  Eyes: normal  Ears: normal  Nose: normal  Lungs: normal  Heart: normal  Musculoskeletal: normal  Neurological: normal  Skin: normal  Lymph nodes: abnormal- subcentimeter mildly tender left submandibular lymph node.  Mouth: normal  Gastrointestinal: normal      Reviewed all available old and outside pertinent medical records.    All lab results personally reviewed and interpreted by me.    1. Sjogren's syndrome, with unspecified organ involvement    2. Research exam          Problem List Items Addressed This Visit       Sjogren's syndrome - Primary     Other Visit Diagnoses         Research exam                  Seropositive Sjogren's on the Deucravacitinib clinical trial   Significant improvement of arthritis and sicca syndrome on research medication and Plaquenil.    No parotitis.  Persistent fatigue.  Currently on hydroxychloroquine 200 mg daily and research medication.   Continue study drug  Research  labs reviewed.  Mildly elevated high sensitivity CRP likely secondary to obesity.  Normal liver functions except mild abnormality of alk-phos with normal GGT.  Monitor monthly labs.        Past Medical History:   Diagnosis Date    Anemia     Constipation     Depression     Endometriosis of pelvis     GERD (gastroesophageal reflux disease)     Gestational diabetes mellitus in pregnancy     Heart murmur     Hypertension     Lung nodule     Osteoporosis     Sjogren's syndrome        Past Surgical History:   Procedure Laterality Date    COSMETIC SURGERY          Social History     Tobacco Use    Smoking status: Never     Passive exposure: Never    Smokeless tobacco: Never   Substance Use Topics    Alcohol use: Yes     Alcohol/week: 6.0 standard drinks of alcohol     Types: 6 Glasses of wine per week     Comment: socially    Drug use: Never       Family History   Problem Relation Name Age of Onset    Diabetes Mother Lali     Cataracts Mother Lali     Early death Mother Lali 52        hepatitis c    Arthritis Maternal Aunt Barbara         rheumatoid    Rheum arthritis Maternal Aunt Barbara     Osteoarthritis Maternal Aunt Barbara     Breast cancer Paternal Aunt Lisa Jj     Heart disease Maternal Grandfather      Macular degeneration Paternal Grandmother Hang     Cancer Paternal Grandmother Hang         stomach    Stroke Paternal Grandfather Don        Review of patient's allergies indicates:   Allergen Reactions    Doxycycline Shortness Of Breath     Elevated BP and shortness of breath    Shrimp Swelling    Prednisone Hives     Hives after taking medrol dose pack    Pcn [penicillins] Rash       Medication List with Changes/Refills   Current Medications    DILTIAZEM (CARDIZEM CD) 180 MG 24 HR CAPSULE    TAKE 1 CAPSULE BY MOUTH ONCE DAILY.    DIPHENHYDRAMINE (BENADRYL) 25 MG CAPSULE    Take 25 mg by mouth every 6 (six) hours as needed for Itching.    EPINEPHRINE (EPIPEN) 0.3 MG/0.3 ML ATIN    Inject 0.3 mLs (0.3 mg total)  into the muscle once. If symptoms not improved repeat in about 5-15 minutes - inject a second dose (pen) of 0.3 mL into muscle once. for 1 dose    ESOMEPRAZOLE (NEXIUM) 40 MG CAPSULE    TAKE 1 CAPSULE BY MOUTH ONCE DAILY BEFORE BREAKFAST.    ESTRADIOL (ESTRACE) 0.01 % (0.1 MG/GRAM) VAGINAL CREAM    Insert 1 gram twice weekly    HYDROXYCHLOROQUINE (PLAQUENIL) 200 MG TABLET    Take 1 tablet (200 mg total) by mouth once daily.    INV DEUCRAVACTINIB/PLACEBO TABLET    Take 2 tablets by mouth in the morning and 2 tablets by mouth in the evening as directed approximately 12 hours apart. For Investigational Use only. Protocol -3642 Subject ID#  [id number]    LORATADINE (CLARITIN) 10 MG TABLET    Take 10 mg by mouth once daily.    METFORMIN (GLUCOPHAGE-XR) 500 MG ER 24HR TABLET    Take 1 tablet (500 mg total) by mouth 2 (two) times daily with meals.    NATREXONE TABLET 4.5 MG    Take 1 tablet (4.5 mg total) by mouth every evening.    NYSTATIN-TRIAMCINOLONE (MYCOLOG II) CREAM    Apply topically 2 (two) times daily.    SERTRALINE (ZOLOFT) 50 MG TABLET    Take 1 tablet (50 mg total) by mouth once daily.    TIRZEPATIDE, WEIGHT LOSS, (ZEPBOUND) 2.5 MG/0.5 ML SOLN    Inject 0.5 mLs (2.5 mg total) into the skin every 7 days.         Disclaimer: This note was prepared using voice recognition system and is likely to have sound alike errors and is not proofread.  Please message me with any questions.

## 2025-06-24 NOTE — TELEPHONE ENCOUNTER
Copied from CRM #[id number]. Topic: General Inquiry - Patient Advice  >> Jun 24, 2025  2:09 PM Hortensia wrote:  Type:  Needs Medical Advice    Who Called: Rain  Symptoms (please be specific): n/a   How long has patient had these symptoms:  n/a  Pharmacy name and phone #:  n/a  Would the patient rather a call back or a response via [facility]? Call back   Best Call Back Number: [phone]   Additional Information: pt is calling in regards to her upcoming appt. Pt stated that she had a EKG did on today and it was not that great. Pt would like to know if she needs to move her August visit up sooner.      Thanks KB     Thanks KB

## 2025-06-24 NOTE — PROGRESS NOTES
HPI    1. Sjogren's syndrome  2. KCS OU  3. Rosacea  4. Long term Plaquenil use started in 2014 (approx) once day at present  5. Borderline diabetes  6. SCTL wearer (monova)      Last edited by Ruthie Hunter on 6/24/2025  8:23 AM.            Assessment /Plan     For exam results, see Encounter Report.      ICD-10-CM ICD-9-CM    1. Sjogren's syndrome with keratoconjunctivitis sicca  M35.01 710.2       2. Long-term use of Plaquenil  Z79.899 V58.69       3. Rheumatoid factor positive  R76.8 795.79             [place] [facility] [place]  Ophthalmology : Juan Diego Sahni MD    Appendix 4 - Ocular Assessments Worksheet    Schirmer's Test    Start and stop time should be recorded and based on a 5-minute period for testing.     Sample Collection Start Time: 8:35 AM   Sample Collection Stop Time: 8:40 AM     Measurements (mm)       Right Eye Left Eye   Leading edge (mm) 8  10   Lagging edge (mm) 5 4   Median value 6.5 7   Was moisture strip saturated (>35 mm)? Yes/No NO NO     Tear Break-up Test            Right Eye Left Eye   Test #1 (seconds) 6 5   Test #2 (seconds) 5 7   Test #3 (seconds) 10 9   Result of Ophthalmological Examination (seconds; TBUT is the average of 3 scores) 7 7   Is debris present? Yes/No YES NO       Wilfredo Rice Score:     Right Eye Left Eye   Medial/Nasal Conjunctiva Grade (0 to 3) 2 0   Lateral/Temporal Conjunctiva Grade (0 to 3) 0 0   Cornea Grade (0 to 3) 2 1   Total score (0 to 9) 4 1

## 2025-06-26 ENCOUNTER — TELEPHONE (OUTPATIENT)
Dept: CARDIOLOGY | Facility: CLINIC | Age: 50
End: 2025-06-26

## 2025-07-01 ENCOUNTER — TELEPHONE (OUTPATIENT)
Dept: CARDIOLOGY | Facility: CLINIC | Age: 50
End: 2025-07-01
Payer: COMMERCIAL

## 2025-07-01 NOTE — TELEPHONE ENCOUNTER
Pt is calling in reg regards to her recent EKG results she would like to know if everything is okay

## 2025-07-02 DIAGNOSIS — Z00.6 RESEARCH SUBJECT: ICD-10-CM

## 2025-07-02 DIAGNOSIS — M35.00 SJOGREN'S SYNDROME, WITH UNSPECIFIED ORGAN INVOLVEMENT: Primary | ICD-10-CM

## 2025-07-23 ENCOUNTER — DOCUMENTATION ONLY (OUTPATIENT)
Dept: RESEARCH | Facility: HOSPITAL | Age: 50
End: 2025-07-23
Payer: COMMERCIAL

## 2025-07-23 NOTE — PROGRESS NOTES
Sponsor: [employer]   Title: -0101  IRB #: 2023.121  : Neil Andrew   Site: 0017  Others Present: N/A  Pt No: [id number]  Visit: Patient Phone Call         Subject contacted via phone on 23Jul2025. Subject reports no AE's since last visit. Due to [facility] scheduling oversight, study drug must be dispensed outside of visit window that was expected to occur on 24Jul2025. The new scheduled visit date will occur on 06Aug2025.     IP Administration/Accountability:      Subject reports- 345038: 0-tablets remain.  [id number]: 8-tablets remain     Adverse Events:      N/A     All procedures were discussed with subject. Subject verbalized understanding. Subject advised to contact research staff immediately for any adverse effects and if adverse effects occur after hours to report immediately to the ER for treatment. Verbalized understanding.

## 2025-08-06 ENCOUNTER — OFFICE VISIT (OUTPATIENT)
Dept: RHEUMATOLOGY | Facility: CLINIC | Age: 50
End: 2025-08-06
Payer: COMMERCIAL

## 2025-08-06 ENCOUNTER — RESEARCH ENCOUNTER (OUTPATIENT)
Dept: RESEARCH | Facility: HOSPITAL | Age: 50
End: 2025-08-06
Payer: COMMERCIAL

## 2025-08-06 VITALS — WEIGHT: 210.75 LBS | BODY MASS INDEX: 33.01 KG/M2

## 2025-08-06 DIAGNOSIS — M35.00 SJOGREN'S SYNDROME, WITH UNSPECIFIED ORGAN INVOLVEMENT: Primary | ICD-10-CM

## 2025-08-06 DIAGNOSIS — R53.82 CHRONIC FATIGUE: ICD-10-CM

## 2025-08-06 DIAGNOSIS — Z00.6 RESEARCH SUBJECT: ICD-10-CM

## 2025-08-06 NOTE — PROGRESS NOTES
Rheumatology clinic research follow-up visit    Sponsor: [employer]   Title: -6017- A Study to Evaluate the Efficacy and Safety of Deucravacitinib in Adults With Active Sjögren's Syndrome   IRB #: [id number]  : Neil Morales   Site: 0017  Others Present: N/A  Is LAR consenting for subject: No     Pt No: [id number]    Chief complaints, HPI, ROS, EXAM, Assessment & Plans:-    Olesya Tay is a 50 y.o. pleasant female seen today for follow up for research visit today.  Currently in clinical trial for Sjogren's syndrome.  Denies any significant joint pain today . No joint swelling or prolonged morning stiffness.  Persistent fatigue.  Lost almost 8 lbs on zepbound and feeling great.  No worsening lymphadenopathy.  Rheumatologic review of systems otherwise negative.      Physical Exam    Date completed:     Physician: Neil Morales MD    Head: normal  Eyes: normal  Ears: normal  Nose: normal  Lungs: normal  Heart: normal  Musculoskeletal: normal  Neurological: normal  Skin: normal  Lymph nodes: abnormal- tender 0.5cms* 0.5 cms right submandibular lymph node and similar size left submandibular lymph node.   Mouth: normal  Gastrointestinal: normal      Reviewed all available old and outside pertinent medical records.    All lab results personally reviewed and interpreted by me.    1. Sjogren's syndrome, with unspecified organ involvement    2. Research subject    3. Chronic fatigue          Problem List Items Addressed This Visit       Chronic fatigue    Research subject    Sjogren's syndrome - Primary         Seropositive Sjogren's on the Deucravacitinib clinical trial   Significant improvement of arthritis and sicca syndrome on research medication and Plaquenil.    No parotitis.  Persistent fatigue.  Currently on hydroxychloroquine 200 mg daily and research medication.   Continue study drug  Research labs reviewed.  Mildly elevated high sensitivity CRP likely  secondary to obesity.  Normal liver functions except mild abnormality of alk-phos with normal GGT.  Monitor monthly labs.        Past Medical History:   Diagnosis Date    Anemia     Constipation     Depression     Endometriosis of pelvis     GERD (gastroesophageal reflux disease)     Gestational diabetes mellitus in pregnancy     Heart murmur     Hypertension     Lung nodule     Osteoporosis     Sjogren's syndrome        Past Surgical History:   Procedure Laterality Date    COSMETIC SURGERY          Social History     Tobacco Use    Smoking status: Never     Passive exposure: Never    Smokeless tobacco: Never   Substance Use Topics    Alcohol use: Yes     Alcohol/week: 6.0 standard drinks of alcohol     Types: 6 Glasses of wine per week     Comment: socially    Drug use: Never       Family History   Problem Relation Name Age of Onset    Diabetes Mother Lali     Cataracts Mother Lali     Early death Mother Lali 52        hepatitis c    Arthritis Maternal Aunt Barbara         rheumatoid    Rheum arthritis Maternal Aunt Barbara     Osteoarthritis Maternal Aunt Barbara     Breast cancer Paternal Aunt Lisa Jj     Heart disease Maternal Grandfather      Macular degeneration Paternal Grandmother Hang     Cancer Paternal Grandmother Hang         stomach    Stroke Paternal Grandfather Don        Review of patient's allergies indicates:   Allergen Reactions    Doxycycline Shortness Of Breath     Elevated BP and shortness of breath    Shrimp Swelling    Prednisone Hives     Hives after taking medrol dose pack    Pcn [penicillins] Rash       Medication List with Changes/Refills   Current Medications    DILTIAZEM (CARDIZEM CD) 180 MG 24 HR CAPSULE    TAKE 1 CAPSULE BY MOUTH ONCE DAILY.    DIPHENHYDRAMINE (BENADRYL) 25 MG CAPSULE    Take 25 mg by mouth every 6 (six) hours as needed for Itching.    EPINEPHRINE (EPIPEN) 0.3 MG/0.3 ML ATIN    Inject 0.3 mLs (0.3 mg total) into the muscle once. If symptoms not improved repeat in about  5-15 minutes - inject a second dose (pen) of 0.3 mL into muscle once. for 1 dose    ESOMEPRAZOLE (NEXIUM) 40 MG CAPSULE    TAKE 1 CAPSULE BY MOUTH ONCE DAILY BEFORE BREAKFAST.    ESTRADIOL (ESTRACE) 0.01 % (0.1 MG/GRAM) VAGINAL CREAM    Insert 1 gram twice weekly    HYDROXYCHLOROQUINE (PLAQUENIL) 200 MG TABLET    Take 1 tablet (200 mg total) by mouth once daily.    INV DEUCRAVACTINIB/PLACEBO TABLET    Take 2 tablets by mouth in the morning and 2 tablets by mouth in the evening as directed approximately 12 hours apart. For Investigational Use only. Protocol -2022 Subject ID#  [id number]    LORATADINE (CLARITIN) 10 MG TABLET    Take 10 mg by mouth once daily.    METFORMIN (GLUCOPHAGE-XR) 500 MG ER 24HR TABLET    Take 1 tablet (500 mg total) by mouth 2 (two) times daily with meals.    NATREXONE TABLET 4.5 MG    Take 1 tablet (4.5 mg total) by mouth every evening.    NYSTATIN-TRIAMCINOLONE (MYCOLOG II) CREAM    Apply topically 2 (two) times daily.    SERTRALINE (ZOLOFT) 50 MG TABLET    Take 1 tablet (50 mg total) by mouth once daily.    TIRZEPATIDE, WEIGHT LOSS, (ZEPBOUND) 2.5 MG/0.5 ML SOLN    Inject 0.5 mLs (2.5 mg total) into the skin every 7 days.         Disclaimer: This note was prepared using voice recognition system and is likely to have sound alike errors and is not proofread.  Please message me with any questions.

## 2025-08-06 NOTE — PROGRESS NOTES
Visit: WEEK 56  Sponsor: [employer]   Title: -4066  IRB #: 2023.121  : Neil Morales   Site: 0017  Others Present: N/A  Pt No: [id number]     Subject presents to the clinic for Week 56 visit. Subject reports minor swelling in saliva gland that began Monday. She also reports mild increase in fatigue and some tenderness in hands, but overall feeling well. Subject also reports morning stifness lasting less than 10 minutes.     Visit occurred outside window due to scheduling conflicts.     IRT contacted and visit was registered.     Concomitant medications reviewed with the patient and updated to ensure accuracy. Refer to Concomitant Medication Log in subject binder.     Vitals:   BP: 117/71 mm HG  Pulse: 85 beats per minute  Respiratory Rate: 18 breaths per minute  Temp: 98.3 degrees fahrenheit (oral)  Weight: 95.6 kg  Is Subject of Childbearing Potential: No, postmenopausal. Patient was advised about the risk of pregnancy during the course of the study. Patient verbalized understanding of study visit procedures.      Assessments:  eCOA tablet compliance was reviewed, patient completed all required questionnaires: Subject advised to contact site  immediately if she is unable to log questionnaires. Verbalized understanding.   Patient Assessment completed via [employer]: Yes  Patient Sjogren's Syndrome Questionnaire completed: Yes, results uploaded into EDC and filed in subject file.  Physical assessment completed: Yes  Tender/Swollen Joint Count completed: Yes, results uploaded into EDC and filed in subject file.  Investigator Assessments completed: Yes, results uploaded into EDC and filed in subject file.     Labs:  Fasting Labs and urine were collected per protocol at 10:40AM. All labs were processed and shipped per requisition. Requisition number: [id number], [id number]     Additional Testing:  Urine Pregnancy Test required:  No   Results: N/A     IP  Administration/Accountability:  Subject was educated on medication administration and handling. Informed subject to return any/all unused and empty bottles at each on-site visit. Verbalized understanding. Drug accountability completed by myself and research pharmacist. IP returned: [id number].     % Adherence: 100%     IP dispensed: [id number], [id number], [id number], [id number]     On-site dose administered  at 10:45 AM. Patient tolerated well.      Adverse Events Reported: N/A     End of Visit:  Patient has the study-related emergency contacts. Subject advised to contact research staff immediately for any adverse effects and if adverse effects occur after hours to report immediately to the ER for treatment. Verbalized understanding.      Week 64 visit was scheduled with the subject.     Visit registered in [employer].   [employer] payment? Yes

## 2025-08-07 NOTE — PROGRESS NOTES
Late Entry for Visit that occurred on 01APR2025    Cryoglobulin lab not drawn due to site oversight.